# Patient Record
Sex: FEMALE | Race: WHITE | Employment: OTHER
[De-identification: names, ages, dates, MRNs, and addresses within clinical notes are randomized per-mention and may not be internally consistent; named-entity substitution may affect disease eponyms.]

---

## 2018-04-19 ENCOUNTER — RECORDS - HEALTHEAST (OUTPATIENT)
Dept: ADMINISTRATIVE | Facility: OTHER | Age: 68
End: 2018-04-19

## 2018-04-19 ENCOUNTER — AMBULATORY - HEALTHEAST (OUTPATIENT)
Dept: NEUROSURGERY | Facility: CLINIC | Age: 68
End: 2018-04-19

## 2018-04-20 ENCOUNTER — OFFICE VISIT - HEALTHEAST (OUTPATIENT)
Dept: NEUROSURGERY | Facility: CLINIC | Age: 68
End: 2018-04-20

## 2018-04-20 ENCOUNTER — RECORDS - HEALTHEAST (OUTPATIENT)
Dept: RADIOLOGY | Facility: CLINIC | Age: 68
End: 2018-04-20

## 2018-04-20 ENCOUNTER — COMMUNICATION - HEALTHEAST (OUTPATIENT)
Dept: NEUROSURGERY | Facility: CLINIC | Age: 68
End: 2018-04-20

## 2018-04-20 ENCOUNTER — RECORDS - HEALTHEAST (OUTPATIENT)
Dept: ADMINISTRATIVE | Facility: OTHER | Age: 68
End: 2018-04-20

## 2018-04-20 DIAGNOSIS — M51.16 LUMBAR DISC HERNIATION WITH RADICULOPATHY: ICD-10-CM

## 2018-04-20 ASSESSMENT — MIFFLIN-ST. JEOR: SCORE: 1246.64

## 2018-04-23 ENCOUNTER — ANESTHESIA - HEALTHEAST (OUTPATIENT)
Dept: SURGERY | Facility: CLINIC | Age: 68
End: 2018-04-23

## 2018-04-23 ENCOUNTER — SURGERY - HEALTHEAST (OUTPATIENT)
Dept: SURGERY | Facility: CLINIC | Age: 68
End: 2018-04-23

## 2018-04-23 ASSESSMENT — MIFFLIN-ST. JEOR: SCORE: 1267.88

## 2018-04-24 ENCOUNTER — COMMUNICATION - HEALTHEAST (OUTPATIENT)
Dept: NEUROSURGERY | Facility: CLINIC | Age: 68
End: 2018-04-24

## 2018-04-25 ENCOUNTER — COMMUNICATION - HEALTHEAST (OUTPATIENT)
Dept: NEUROSURGERY | Facility: CLINIC | Age: 68
End: 2018-04-25

## 2018-04-27 ENCOUNTER — RECORDS - HEALTHEAST (OUTPATIENT)
Dept: ADMINISTRATIVE | Facility: OTHER | Age: 68
End: 2018-04-27

## 2018-04-30 ENCOUNTER — AMBULATORY - HEALTHEAST (OUTPATIENT)
Dept: NEUROSURGERY | Facility: CLINIC | Age: 68
End: 2018-04-30

## 2018-04-30 DIAGNOSIS — M54.16 LEFT LUMBAR RADICULOPATHY: ICD-10-CM

## 2018-04-30 DIAGNOSIS — Z51.89 VISIT FOR WOUND CHECK: ICD-10-CM

## 2018-05-07 ENCOUNTER — AMBULATORY - HEALTHEAST (OUTPATIENT)
Dept: NEUROSURGERY | Facility: CLINIC | Age: 68
End: 2018-05-07

## 2018-05-07 ENCOUNTER — RECORDS - HEALTHEAST (OUTPATIENT)
Dept: ADMINISTRATIVE | Facility: OTHER | Age: 68
End: 2018-05-07

## 2018-05-08 ENCOUNTER — COMMUNICATION - HEALTHEAST (OUTPATIENT)
Dept: NEUROSURGERY | Facility: CLINIC | Age: 68
End: 2018-05-08

## 2018-05-25 ENCOUNTER — OFFICE VISIT - HEALTHEAST (OUTPATIENT)
Dept: NEUROSURGERY | Facility: CLINIC | Age: 68
End: 2018-05-25

## 2018-05-25 DIAGNOSIS — M54.16 LEFT LUMBAR RADICULOPATHY: ICD-10-CM

## 2018-07-18 ENCOUNTER — RECORDS - HEALTHEAST (OUTPATIENT)
Dept: ADMINISTRATIVE | Facility: OTHER | Age: 68
End: 2018-07-18

## 2018-10-25 ENCOUNTER — COMMUNICATION - HEALTHEAST (OUTPATIENT)
Dept: NEUROSURGERY | Facility: CLINIC | Age: 68
End: 2018-10-25

## 2018-11-02 ENCOUNTER — HOSPITAL ENCOUNTER (OUTPATIENT)
Dept: MRI IMAGING | Facility: HOSPITAL | Age: 68
Discharge: HOME | End: 2018-11-02
Attending: NEUROLOGICAL SURGERY

## 2018-11-02 ENCOUNTER — OFFICE VISIT - HEALTHEAST (OUTPATIENT)
Dept: NEUROSURGERY | Facility: CLINIC | Age: 68
End: 2018-11-02

## 2018-11-02 DIAGNOSIS — M54.16 LUMBAR RADICULOPATHY: ICD-10-CM

## 2018-11-02 LAB
CREAT BLD-MCNC: 0.9 MG/DL
CREAT BLD-MCNC: 0.9 MG/DL (ref 0.6–1.1)
POC GFR AMER AF HE - HISTORICAL: >60 ML/MIN/1.73M2
POC GFR NON AMER AF HE - HISTORICAL: >60 ML/MIN/1.73M2

## 2018-11-02 ASSESSMENT — MIFFLIN-ST. JEOR: SCORE: 1264.78

## 2018-11-21 ENCOUNTER — HOSPITAL ENCOUNTER (OUTPATIENT)
Dept: CT IMAGING | Facility: CLINIC | Age: 68
Discharge: HOME | End: 2018-11-21
Attending: SURGERY

## 2018-11-21 ENCOUNTER — AMBULATORY - HEALTHEAST (OUTPATIENT)
Dept: NEUROSURGERY | Facility: CLINIC | Age: 68
End: 2018-11-21

## 2018-11-21 ENCOUNTER — OFFICE VISIT - HEALTHEAST (OUTPATIENT)
Dept: NEUROSURGERY | Facility: CLINIC | Age: 68
End: 2018-11-21

## 2018-11-21 ENCOUNTER — AMBULATORY - HEALTHEAST (OUTPATIENT)
Dept: LAB | Facility: CLINIC | Age: 68
End: 2018-11-21

## 2018-11-21 DIAGNOSIS — Z01.818 PRE-OP EVALUATION: ICD-10-CM

## 2018-11-21 DIAGNOSIS — M51.26 LUMBAR HERNIATED DISC: ICD-10-CM

## 2018-11-21 LAB
ANION GAP SERPL CALCULATED.3IONS-SCNC: 7 MMOL/L (ref 5–18)
APTT PPP: 27 SECONDS (ref 24–37)
BASOPHILS # BLD AUTO: 0 THOU/UL (ref 0–0.2)
BASOPHILS NFR BLD AUTO: 1 % (ref 0–2)
BUN SERPL-MCNC: 12 MG/DL (ref 8–22)
CALCIUM SERPL-MCNC: 10.5 MG/DL (ref 8.5–10.5)
CHLORIDE BLD-SCNC: 108 MMOL/L (ref 98–107)
CLOSURE TME COLL+EPINEP BLD: 94 SEC (ref 1–180)
CO2 SERPL-SCNC: 27 MMOL/L (ref 22–31)
CREAT SERPL-MCNC: 0.71 MG/DL (ref 0.6–1.1)
EOSINOPHIL # BLD AUTO: 0.1 THOU/UL (ref 0–0.4)
EOSINOPHIL NFR BLD AUTO: 2 % (ref 0–6)
ERYTHROCYTE [DISTWIDTH] IN BLOOD BY AUTOMATED COUNT: 12.1 % (ref 11–14.5)
GFR SERPL CREATININE-BSD FRML MDRD: >60 ML/MIN/1.73M2
GLUCOSE BLD-MCNC: 96 MG/DL (ref 70–125)
HCT VFR BLD AUTO: 38.1 % (ref 35–47)
HGB BLD-MCNC: 12.7 G/DL (ref 12–16)
INR PPP: 0.94 (ref 0.9–1.1)
LYMPHOCYTES # BLD AUTO: 2 THOU/UL (ref 0.8–4.4)
LYMPHOCYTES NFR BLD AUTO: 39 % (ref 20–40)
MCH RBC QN AUTO: 31.6 PG (ref 27–34)
MCHC RBC AUTO-ENTMCNC: 33.3 G/DL (ref 32–36)
MCV RBC AUTO: 95 FL (ref 80–100)
MONOCYTES # BLD AUTO: 0.4 THOU/UL (ref 0–0.9)
MONOCYTES NFR BLD AUTO: 7 % (ref 2–10)
NEUTROPHILS # BLD AUTO: 2.7 THOU/UL (ref 2–7.7)
NEUTROPHILS NFR BLD AUTO: 52 % (ref 50–70)
PLATELET # BLD AUTO: 234 THOU/UL (ref 140–440)
PMV BLD AUTO: 9.9 FL (ref 8.5–12.5)
POTASSIUM BLD-SCNC: 4.1 MMOL/L (ref 3.5–5)
RBC # BLD AUTO: 4.02 MILL/UL (ref 3.8–5.4)
SODIUM SERPL-SCNC: 142 MMOL/L (ref 136–145)
WBC: 5.2 THOU/UL (ref 4–11)

## 2018-11-21 ASSESSMENT — MIFFLIN-ST. JEOR: SCORE: 1264.78

## 2018-11-23 ENCOUNTER — COMMUNICATION - HEALTHEAST (OUTPATIENT)
Dept: NEUROSURGERY | Facility: CLINIC | Age: 68
End: 2018-11-23

## 2018-11-26 ENCOUNTER — RECORDS - HEALTHEAST (OUTPATIENT)
Dept: ADMINISTRATIVE | Facility: OTHER | Age: 68
End: 2018-11-26

## 2018-11-27 ENCOUNTER — ANESTHESIA - HEALTHEAST (OUTPATIENT)
Dept: SURGERY | Facility: CLINIC | Age: 68
End: 2018-11-27

## 2018-11-27 ENCOUNTER — AMBULATORY - HEALTHEAST (OUTPATIENT)
Dept: NEUROSURGERY | Facility: CLINIC | Age: 68
End: 2018-11-27

## 2018-11-27 ENCOUNTER — COMMUNICATION - HEALTHEAST (OUTPATIENT)
Dept: NEUROSURGERY | Facility: CLINIC | Age: 68
End: 2018-11-27

## 2018-11-28 ENCOUNTER — SURGERY - HEALTHEAST (OUTPATIENT)
Dept: SURGERY | Facility: CLINIC | Age: 68
End: 2018-11-28

## 2018-11-28 ASSESSMENT — MIFFLIN-ST. JEOR: SCORE: 1257.12

## 2018-11-29 ENCOUNTER — COMMUNICATION - HEALTHEAST (OUTPATIENT)
Dept: NEUROLOGY | Facility: CLINIC | Age: 68
End: 2018-11-29

## 2018-12-21 ENCOUNTER — AMBULATORY - HEALTHEAST (OUTPATIENT)
Dept: NEUROSURGERY | Facility: CLINIC | Age: 68
End: 2018-12-21

## 2018-12-21 DIAGNOSIS — M54.9 BACK PAIN: ICD-10-CM

## 2019-01-01 ENCOUNTER — COMMUNICATION - HEALTHEAST (OUTPATIENT)
Dept: PALLIATIVE MEDICINE | Facility: OTHER | Age: 69
End: 2019-01-01

## 2019-01-01 ENCOUNTER — HOSPITAL ENCOUNTER (OUTPATIENT)
Dept: PALLIATIVE MEDICINE | Facility: OTHER | Age: 69
Discharge: HOME | End: 2019-12-19
Attending: PAIN MEDICINE | Admitting: PAIN MEDICINE

## 2019-01-01 ENCOUNTER — RECORDS - HEALTHEAST (OUTPATIENT)
Dept: ADMINISTRATIVE | Facility: OTHER | Age: 69
End: 2019-01-01

## 2019-01-01 ENCOUNTER — AMBULATORY - HEALTHEAST (OUTPATIENT)
Dept: PALLIATIVE MEDICINE | Facility: OTHER | Age: 69
End: 2019-01-01

## 2019-01-01 ENCOUNTER — AMBULATORY - HEALTHEAST (OUTPATIENT)
Dept: NEUROSURGERY | Facility: CLINIC | Age: 69
End: 2019-01-01

## 2019-01-01 DIAGNOSIS — M54.16 LUMBAR RADICULOPATHY: ICD-10-CM

## 2019-01-01 ASSESSMENT — MIFFLIN-ST. JEOR: SCORE: 1255.71

## 2019-04-30 ENCOUNTER — RECORDS - HEALTHEAST (OUTPATIENT)
Dept: ADMINISTRATIVE | Facility: OTHER | Age: 69
End: 2019-04-30

## 2019-09-11 ENCOUNTER — COMMUNICATION - HEALTHEAST (OUTPATIENT)
Dept: NEUROSURGERY | Facility: CLINIC | Age: 69
End: 2019-09-11

## 2019-09-11 DIAGNOSIS — M54.9 BACK PAIN: ICD-10-CM

## 2019-09-30 ENCOUNTER — RECORDS - HEALTHEAST (OUTPATIENT)
Dept: ADMINISTRATIVE | Facility: OTHER | Age: 69
End: 2019-09-30

## 2019-10-04 ENCOUNTER — RECORDS - HEALTHEAST (OUTPATIENT)
Dept: RADIOLOGY | Facility: CLINIC | Age: 69
End: 2019-10-04

## 2019-10-08 LAB — MAMMOGRAM: NORMAL

## 2019-10-11 ENCOUNTER — OFFICE VISIT - HEALTHEAST (OUTPATIENT)
Dept: NEUROSURGERY | Facility: CLINIC | Age: 69
End: 2019-10-11

## 2019-10-11 DIAGNOSIS — M79.605 PAIN OF LEFT LOWER EXTREMITY: ICD-10-CM

## 2019-10-11 ASSESSMENT — MIFFLIN-ST. JEOR: SCORE: 1255.71

## 2019-10-22 ENCOUNTER — HOSPITAL ENCOUNTER (OUTPATIENT)
Dept: CT IMAGING | Facility: CLINIC | Age: 69
Discharge: HOME | End: 2019-10-22
Attending: SURGERY

## 2019-10-22 ENCOUNTER — HOSPITAL ENCOUNTER (OUTPATIENT)
Dept: INTERVENTIONAL RADIOLOGY/VASCULAR | Facility: CLINIC | Age: 69
Discharge: HOME | End: 2019-10-22
Attending: SURGERY

## 2019-10-22 DIAGNOSIS — M79.605 PAIN OF LEFT LOWER EXTREMITY: ICD-10-CM

## 2019-10-22 DIAGNOSIS — M54.16 LEFT LUMBAR RADICULOPATHY: ICD-10-CM

## 2019-10-28 ENCOUNTER — COMMUNICATION - HEALTHEAST (OUTPATIENT)
Dept: NEUROSURGERY | Facility: CLINIC | Age: 69
End: 2019-10-28

## 2019-11-08 ENCOUNTER — OFFICE VISIT - HEALTHEAST (OUTPATIENT)
Dept: NEUROSURGERY | Facility: CLINIC | Age: 69
End: 2019-11-08

## 2019-11-08 DIAGNOSIS — R52 PAIN: ICD-10-CM

## 2019-11-08 ASSESSMENT — MIFFLIN-ST. JEOR: SCORE: 1255.71

## 2020-01-01 ENCOUNTER — TRANSFERRED RECORDS (OUTPATIENT)
Dept: HEALTH INFORMATION MANAGEMENT | Facility: CLINIC | Age: 70
End: 2020-01-01

## 2020-01-01 ENCOUNTER — HOSPITAL ENCOUNTER (OUTPATIENT)
Facility: CLINIC | Age: 70
Discharge: HOME | End: 2020-07-21
Attending: INTERNAL MEDICINE | Admitting: INTERNAL MEDICINE
Payer: MEDICARE

## 2020-01-01 ENCOUNTER — OFFICE VISIT (OUTPATIENT)
Dept: GASTROENTEROLOGY | Facility: CLINIC | Age: 70
End: 2020-01-01
Attending: STUDENT IN AN ORGANIZED HEALTH CARE EDUCATION/TRAINING PROGRAM
Payer: MEDICARE

## 2020-01-01 ENCOUNTER — TELEPHONE (OUTPATIENT)
Dept: GASTROENTEROLOGY | Facility: CLINIC | Age: 70
End: 2020-01-01

## 2020-01-01 ENCOUNTER — AMBULATORY - HEALTHEAST (OUTPATIENT)
Dept: FAMILY MEDICINE | Facility: CLINIC | Age: 70
End: 2020-01-01

## 2020-01-01 ENCOUNTER — PATIENT OUTREACH (OUTPATIENT)
Dept: GASTROENTEROLOGY | Facility: CLINIC | Age: 70
End: 2020-01-01

## 2020-01-01 ENCOUNTER — ANESTHESIA (OUTPATIENT)
Dept: SURGERY | Facility: CLINIC | Age: 70
DRG: 438 | End: 2020-01-01
Payer: MEDICARE

## 2020-01-01 ENCOUNTER — APPOINTMENT (OUTPATIENT)
Dept: GENERAL RADIOLOGY | Facility: CLINIC | Age: 70
DRG: 438 | End: 2020-01-01
Attending: INTERNAL MEDICINE
Payer: MEDICARE

## 2020-01-01 ENCOUNTER — COMMUNICATION - HEALTHEAST (OUTPATIENT)
Dept: NEUROSURGERY | Facility: CLINIC | Age: 70
End: 2020-01-01

## 2020-01-01 ENCOUNTER — CARE COORDINATION (OUTPATIENT)
Dept: GASTROENTEROLOGY | Facility: CLINIC | Age: 70
End: 2020-01-01

## 2020-01-01 ENCOUNTER — ANESTHESIA EVENT (OUTPATIENT)
Dept: SURGERY | Facility: CLINIC | Age: 70
End: 2020-01-01
Payer: MEDICARE

## 2020-01-01 ENCOUNTER — ANESTHESIA (OUTPATIENT)
Dept: SURGERY | Facility: CLINIC | Age: 70
End: 2020-01-01
Payer: MEDICARE

## 2020-01-01 ENCOUNTER — VIRTUAL VISIT (OUTPATIENT)
Dept: GASTROENTEROLOGY | Facility: CLINIC | Age: 70
End: 2020-01-01
Payer: MEDICARE

## 2020-01-01 ENCOUNTER — APPOINTMENT (OUTPATIENT)
Dept: GENERAL RADIOLOGY | Facility: CLINIC | Age: 70
End: 2020-01-01
Attending: INTERNAL MEDICINE
Payer: MEDICARE

## 2020-01-01 ENCOUNTER — APPOINTMENT (OUTPATIENT)
Dept: CT IMAGING | Facility: CLINIC | Age: 70
DRG: 438 | End: 2020-01-01
Payer: MEDICARE

## 2020-01-01 ENCOUNTER — COMMUNICATION - HEALTHEAST (OUTPATIENT)
Dept: PALLIATIVE MEDICINE | Facility: OTHER | Age: 70
End: 2020-01-01

## 2020-01-01 ENCOUNTER — DOCUMENTATION ONLY (OUTPATIENT)
Dept: CARE COORDINATION | Facility: CLINIC | Age: 70
End: 2020-01-01

## 2020-01-01 ENCOUNTER — HOSPITAL ENCOUNTER (OUTPATIENT)
Dept: GENERAL RADIOLOGY | Facility: CLINIC | Age: 70
End: 2020-06-04
Attending: INTERNAL MEDICINE
Payer: MEDICARE

## 2020-01-01 ENCOUNTER — PATIENT OUTREACH (OUTPATIENT)
Dept: CARE COORDINATION | Facility: CLINIC | Age: 70
End: 2020-01-01

## 2020-01-01 ENCOUNTER — PRE VISIT (OUTPATIENT)
Dept: GASTROENTEROLOGY | Facility: CLINIC | Age: 70
End: 2020-01-01

## 2020-01-01 ENCOUNTER — HOSPITAL ENCOUNTER (INPATIENT)
Facility: CLINIC | Age: 70
LOS: 2 days | Discharge: HOME | DRG: 438 | End: 2020-06-23
Attending: INTERNAL MEDICINE | Admitting: FAMILY MEDICINE
Payer: MEDICARE

## 2020-01-01 ENCOUNTER — PREP FOR PROCEDURE (OUTPATIENT)
Dept: GASTROENTEROLOGY | Facility: CLINIC | Age: 70
End: 2020-01-01

## 2020-01-01 ENCOUNTER — HOSPITAL ENCOUNTER (INPATIENT)
Facility: CLINIC | Age: 70
LOS: 6 days | Discharge: HOME | DRG: 438 | End: 2020-10-27
Attending: EMERGENCY MEDICINE | Admitting: INTERNAL MEDICINE
Payer: MEDICARE

## 2020-01-01 ENCOUNTER — HOSPITAL ENCOUNTER (OUTPATIENT)
Facility: CLINIC | Age: 70
Discharge: HOME | End: 2020-06-04
Attending: INTERNAL MEDICINE | Admitting: INTERNAL MEDICINE
Payer: MEDICARE

## 2020-01-01 ENCOUNTER — AMBULATORY - HEALTHEAST (OUTPATIENT)
Dept: LAB | Facility: CLINIC | Age: 70
End: 2020-01-01

## 2020-01-01 ENCOUNTER — ANESTHESIA EVENT (OUTPATIENT)
Dept: SURGERY | Facility: CLINIC | Age: 70
DRG: 438 | End: 2020-01-01
Payer: MEDICARE

## 2020-01-01 ENCOUNTER — HOSPITAL ENCOUNTER (OUTPATIENT)
Facility: CLINIC | Age: 70
Discharge: HOME | End: 2020-04-23
Attending: INTERNAL MEDICINE | Admitting: INTERNAL MEDICINE
Payer: MEDICARE

## 2020-01-01 ENCOUNTER — OFFICE VISIT (OUTPATIENT)
Dept: URGENT CARE | Facility: URGENT CARE | Age: 70
End: 2020-01-01
Payer: MEDICARE

## 2020-01-01 ENCOUNTER — HOSPITAL ENCOUNTER (OUTPATIENT)
Facility: CLINIC | Age: 70
End: 2020-01-01
Attending: INTERNAL MEDICINE | Admitting: INTERNAL MEDICINE
Payer: MEDICARE

## 2020-01-01 ENCOUNTER — COMMUNICATION - HEALTHEAST (OUTPATIENT)
Dept: FAMILY MEDICINE | Facility: CLINIC | Age: 70
End: 2020-01-01

## 2020-01-01 VITALS
TEMPERATURE: 98 F | BODY MASS INDEX: 24 KG/M2 | WEIGHT: 139.8 LBS | OXYGEN SATURATION: 100 % | DIASTOLIC BLOOD PRESSURE: 76 MMHG | SYSTOLIC BLOOD PRESSURE: 122 MMHG | HEART RATE: 68 BPM

## 2020-01-01 VITALS
SYSTOLIC BLOOD PRESSURE: 131 MMHG | OXYGEN SATURATION: 98 % | RESPIRATION RATE: 22 BRPM | BODY MASS INDEX: 22.94 KG/M2 | TEMPERATURE: 97.9 F | HEART RATE: 78 BPM | HEIGHT: 64 IN | DIASTOLIC BLOOD PRESSURE: 88 MMHG | WEIGHT: 134.4 LBS

## 2020-01-01 VITALS
RESPIRATION RATE: 16 BRPM | OXYGEN SATURATION: 97 % | HEART RATE: 81 BPM | TEMPERATURE: 97.8 F | DIASTOLIC BLOOD PRESSURE: 76 MMHG | SYSTOLIC BLOOD PRESSURE: 148 MMHG

## 2020-01-01 VITALS
RESPIRATION RATE: 16 BRPM | OXYGEN SATURATION: 96 % | DIASTOLIC BLOOD PRESSURE: 69 MMHG | HEART RATE: 71 BPM | SYSTOLIC BLOOD PRESSURE: 116 MMHG

## 2020-01-01 VITALS
SYSTOLIC BLOOD PRESSURE: 121 MMHG | HEART RATE: 60 BPM | HEIGHT: 64 IN | DIASTOLIC BLOOD PRESSURE: 74 MMHG | OXYGEN SATURATION: 100 % | BODY MASS INDEX: 23.75 KG/M2 | RESPIRATION RATE: 16 BRPM | TEMPERATURE: 97.5 F | WEIGHT: 139.11 LBS

## 2020-01-01 VITALS
HEIGHT: 64 IN | BODY MASS INDEX: 25.63 KG/M2 | RESPIRATION RATE: 16 BRPM | HEART RATE: 68 BPM | TEMPERATURE: 97.8 F | DIASTOLIC BLOOD PRESSURE: 72 MMHG | SYSTOLIC BLOOD PRESSURE: 141 MMHG | WEIGHT: 150.13 LBS | OXYGEN SATURATION: 98 %

## 2020-01-01 VITALS — BODY MASS INDEX: 23.05 KG/M2 | WEIGHT: 135 LBS | HEIGHT: 64 IN

## 2020-01-01 DIAGNOSIS — K86.1 CHRONIC PANCREATITIS (H): ICD-10-CM

## 2020-01-01 DIAGNOSIS — Z20.822 SUSPECTED COVID-19 VIRUS INFECTION: Primary | ICD-10-CM

## 2020-01-01 DIAGNOSIS — Z11.59 ENCOUNTER FOR SCREENING FOR OTHER VIRAL DISEASES: ICD-10-CM

## 2020-01-01 DIAGNOSIS — Z11.59 SPECIAL SCREENING EXAMINATION FOR VIRAL DISEASE: ICD-10-CM

## 2020-01-01 DIAGNOSIS — Z46.89 ENCOUNTER FOR REMOVAL OF BILIARY STENT: ICD-10-CM

## 2020-01-01 DIAGNOSIS — K86.1 CHRONIC PANCREATITIS (H): Primary | ICD-10-CM

## 2020-01-01 DIAGNOSIS — Q45.3 PANCREAS DIVISUM: Primary | ICD-10-CM

## 2020-01-01 DIAGNOSIS — R94.5 NONSPECIFIC ABNORMAL RESULTS OF LIVER FUNCTION STUDY: ICD-10-CM

## 2020-01-01 DIAGNOSIS — K86.89 PANCREATIC DUCT STRICTURE: ICD-10-CM

## 2020-01-01 DIAGNOSIS — K85.00 IDIOPATHIC ACUTE PANCREATITIS: Primary | ICD-10-CM

## 2020-01-01 DIAGNOSIS — K85.00 IDIOPATHIC ACUTE PANCREATITIS: ICD-10-CM

## 2020-01-01 DIAGNOSIS — Z46.89 ENCOUNTER FOR REMOVAL OF BILIARY STENT: Primary | ICD-10-CM

## 2020-01-01 DIAGNOSIS — Z11.59 ENCOUNTER FOR SCREENING FOR OTHER VIRAL DISEASES: Primary | ICD-10-CM

## 2020-01-01 DIAGNOSIS — K85.90 ACUTE ON CHRONIC PANCREATITIS (H): ICD-10-CM

## 2020-01-01 DIAGNOSIS — Z20.828 EXPOSURE TO SARS-ASSOCIATED CORONAVIRUS: ICD-10-CM

## 2020-01-01 DIAGNOSIS — K74.60 CIRRHOSIS OF LIVER WITHOUT ASCITES, UNSPECIFIED HEPATIC CIRRHOSIS TYPE (H): Primary | ICD-10-CM

## 2020-01-01 DIAGNOSIS — Z20.822 ENCOUNTER FOR LABORATORY TESTING FOR COVID-19 VIRUS: ICD-10-CM

## 2020-01-01 DIAGNOSIS — K86.1 ACUTE ON CHRONIC PANCREATITIS (H): ICD-10-CM

## 2020-01-01 DIAGNOSIS — K85.90 HEREDITARY PANCREATITIS: ICD-10-CM

## 2020-01-01 DIAGNOSIS — K85.00 IDIOPATHIC ACUTE PANCREATITIS, UNSPECIFIED COMPLICATION STATUS: Primary | ICD-10-CM

## 2020-01-01 DIAGNOSIS — K86.1 CHRONIC PANCREATITIS, UNSPECIFIED PANCREATITIS TYPE (H): ICD-10-CM

## 2020-01-01 DIAGNOSIS — D69.6 THROMBOCYTOPENIA (H): Primary | ICD-10-CM

## 2020-01-01 DIAGNOSIS — I81 PORTAL VEIN THROMBOSIS: ICD-10-CM

## 2020-01-01 DIAGNOSIS — K85.90 HEREDITARY PANCREATITIS: Primary | ICD-10-CM

## 2020-01-01 DIAGNOSIS — K86.89 PANCREATIC DUCT STRICTURE: Primary | ICD-10-CM

## 2020-01-01 DIAGNOSIS — K76.0 HEPATIC STEATOSIS: ICD-10-CM

## 2020-01-01 DIAGNOSIS — K85.00 IDIOPATHIC ACUTE PANCREATITIS, UNSPECIFIED COMPLICATION STATUS: ICD-10-CM

## 2020-01-01 LAB
ALBUMIN SERPL-MCNC: 2.3 G/DL (ref 3.4–5)
ALBUMIN SERPL-MCNC: 2.8 G/DL (ref 3.4–5)
ALBUMIN SERPL-MCNC: 2.9 G/DL (ref 3.4–5)
ALBUMIN SERPL-MCNC: 3.3 G/DL (ref 3.4–5)
ALBUMIN SERPL-MCNC: 3.5 G/DL (ref 3.4–5)
ALBUMIN SERPL-MCNC: 3.8 G/DL (ref 3.4–5)
ALP SERPL-CCNC: 108 U/L (ref 40–150)
ALP SERPL-CCNC: 108 U/L (ref 40–150)
ALP SERPL-CCNC: 122 U/L (ref 40–150)
ALP SERPL-CCNC: 126 U/L (ref 40–150)
ALP SERPL-CCNC: 132 U/L (ref 40–150)
ALP SERPL-CCNC: 149 U/L (ref 40–150)
ALP SERPL-CCNC: 150 U/L (ref 40–150)
ALP SERPL-CCNC: 180 U/L (ref 40–150)
ALP SERPL-CCNC: 184 U/L (ref 40–150)
ALP SERPL-CCNC: 184 U/L (ref 40–150)
ALP SERPL-CCNC: 191 U/L (ref 40–150)
ALT SERPL W P-5'-P-CCNC: 14 U/L (ref 0–50)
ALT SERPL W P-5'-P-CCNC: 16 U/L (ref 0–50)
ALT SERPL W P-5'-P-CCNC: 18 U/L (ref 0–50)
ALT SERPL W P-5'-P-CCNC: 20 U/L (ref 0–50)
ALT SERPL W P-5'-P-CCNC: 22 U/L (ref 0–50)
ALT SERPL W P-5'-P-CCNC: 22 U/L (ref 0–50)
ALT SERPL W P-5'-P-CCNC: 23 U/L (ref 0–50)
ALT SERPL W P-5'-P-CCNC: 32 U/L (ref 0–50)
ALT SERPL W P-5'-P-CCNC: 40 U/L (ref 0–50)
ALT SERPL-CCNC: 16 IU/L (ref 8–45)
AMYLASE SERPL-CCNC: 39 U/L (ref 30–110)
AMYLASE SERPL-CCNC: 47 U/L (ref 30–110)
AMYLASE SERPL-CCNC: 67 U/L (ref 30–110)
ANION GAP SERPL CALCULATED.3IONS-SCNC: 1 MMOL/L (ref 3–14)
ANION GAP SERPL CALCULATED.3IONS-SCNC: 3 MMOL/L (ref 3–14)
ANION GAP SERPL CALCULATED.3IONS-SCNC: 5 MMOL/L (ref 3–14)
ANION GAP SERPL CALCULATED.3IONS-SCNC: 6 MMOL/L (ref 3–14)
ANION GAP SERPL CALCULATED.3IONS-SCNC: 8 MMOL/L (ref 3–14)
AST SERPL W P-5'-P-CCNC: 13 U/L (ref 0–45)
AST SERPL W P-5'-P-CCNC: 13 U/L (ref 0–45)
AST SERPL W P-5'-P-CCNC: 14 U/L (ref 0–45)
AST SERPL W P-5'-P-CCNC: 18 U/L (ref 0–45)
AST SERPL W P-5'-P-CCNC: 19 U/L (ref 0–45)
AST SERPL W P-5'-P-CCNC: 19 U/L (ref 0–45)
AST SERPL W P-5'-P-CCNC: 22 U/L (ref 0–45)
AST SERPL W P-5'-P-CCNC: 23 U/L (ref 0–45)
AST SERPL W P-5'-P-CCNC: 26 U/L (ref 0–45)
AST SERPL W P-5'-P-CCNC: 27 U/L (ref 0–45)
AST SERPL W P-5'-P-CCNC: 32 U/L (ref 0–45)
AST SERPL-CCNC: 15 IU/L (ref 2–40)
BASOPHILS # BLD AUTO: 0 10E9/L (ref 0–0.2)
BASOPHILS # BLD AUTO: 0 10E9/L (ref 0–0.2)
BASOPHILS NFR BLD AUTO: 0.3 %
BASOPHILS NFR BLD AUTO: 0.4 %
BILIRUB SERPL-MCNC: 0.4 MG/DL (ref 0.2–1.3)
BILIRUB SERPL-MCNC: 0.4 MG/DL (ref 0.2–1.3)
BILIRUB SERPL-MCNC: 0.5 MG/DL (ref 0.2–1.3)
BILIRUB SERPL-MCNC: 0.8 MG/DL (ref 0.2–1.3)
BILIRUB SERPL-MCNC: 1 MG/DL (ref 0.2–1.3)
BILIRUB SERPL-MCNC: 1.3 MG/DL (ref 0.2–1.3)
BILIRUB SERPL-MCNC: 1.4 MG/DL (ref 0.2–1.3)
BILIRUB SERPL-MCNC: 1.8 MG/DL (ref 0.2–1.3)
BUN SERPL-MCNC: 11 MG/DL (ref 7–30)
BUN SERPL-MCNC: 12 MG/DL (ref 7–30)
BUN SERPL-MCNC: 13 MG/DL (ref 7–30)
BUN SERPL-MCNC: 16 MG/DL (ref 7–30)
BUN SERPL-MCNC: 18 MG/DL (ref 7–30)
BUN SERPL-MCNC: 7 MG/DL (ref 7–30)
BUN SERPL-MCNC: 8 MG/DL (ref 7–30)
CALCIUM SERPL-MCNC: 10 MG/DL (ref 8.5–10.1)
CALCIUM SERPL-MCNC: 10 MG/DL (ref 8.5–10.1)
CALCIUM SERPL-MCNC: 10.1 MG/DL (ref 8.5–10.1)
CALCIUM SERPL-MCNC: 9 MG/DL (ref 8.5–10.1)
CALCIUM SERPL-MCNC: 9.2 MG/DL (ref 8.5–10.1)
CALCIUM SERPL-MCNC: 9.3 MG/DL (ref 8.5–10.1)
CALCIUM SERPL-MCNC: 9.3 MG/DL (ref 8.5–10.1)
CALCIUM SERPL-MCNC: 9.5 MG/DL (ref 8.5–10.1)
CALCIUM SERPL-MCNC: 9.8 MG/DL (ref 8.5–10.1)
CALCIUM SERPL-MCNC: 9.9 MG/DL (ref 8.5–10.1)
CALCIUM SERPL-MCNC: 9.9 MG/DL (ref 8.5–10.1)
CHLORIDE SERPL-SCNC: 108 MMOL/L (ref 94–109)
CHLORIDE SERPL-SCNC: 108 MMOL/L (ref 94–109)
CHLORIDE SERPL-SCNC: 110 MMOL/L (ref 94–109)
CHLORIDE SERPL-SCNC: 110 MMOL/L (ref 94–109)
CHLORIDE SERPL-SCNC: 111 MMOL/L (ref 94–109)
CHLORIDE SERPL-SCNC: 112 MMOL/L (ref 94–109)
CHLORIDE SERPL-SCNC: 114 MMOL/L (ref 94–109)
CO2 SERPL-SCNC: 21 MMOL/L (ref 20–32)
CO2 SERPL-SCNC: 24 MMOL/L (ref 20–32)
CO2 SERPL-SCNC: 24 MMOL/L (ref 20–32)
CO2 SERPL-SCNC: 25 MMOL/L (ref 20–32)
CO2 SERPL-SCNC: 26 MMOL/L (ref 20–32)
CO2 SERPL-SCNC: 27 MMOL/L (ref 20–32)
CO2 SERPL-SCNC: 29 MMOL/L (ref 20–32)
CREAT SERPL-MCNC: 0.55 MG/DL (ref 0.52–1.04)
CREAT SERPL-MCNC: 0.56 MG/DL (ref 0.52–1.04)
CREAT SERPL-MCNC: 0.57 MG/DL (ref 0.52–1.04)
CREAT SERPL-MCNC: 0.57 MG/DL (ref 0.52–1.04)
CREAT SERPL-MCNC: 0.59 MG/DL (ref 0.57–1.11)
CREAT SERPL-MCNC: 0.61 MG/DL (ref 0.52–1.04)
CREAT SERPL-MCNC: 0.61 MG/DL (ref 0.52–1.04)
CREAT SERPL-MCNC: 0.62 MG/DL (ref 0.52–1.04)
CREAT SERPL-MCNC: 0.62 MG/DL (ref 0.52–1.04)
CREAT SERPL-MCNC: 0.63 MG/DL (ref 0.52–1.04)
CREAT SERPL-MCNC: 0.66 MG/DL (ref 0.52–1.04)
CREAT SERPL-MCNC: 0.69 MG/DL (ref 0.52–1.04)
DIFFERENTIAL METHOD BLD: ABNORMAL
DIFFERENTIAL METHOD BLD: ABNORMAL
EOSINOPHIL # BLD AUTO: 0.1 10E9/L (ref 0–0.7)
EOSINOPHIL # BLD AUTO: 0.1 10E9/L (ref 0–0.7)
EOSINOPHIL NFR BLD AUTO: 0.8 %
EOSINOPHIL NFR BLD AUTO: 1.9 %
ERCP: NORMAL
ERYTHROCYTE [DISTWIDTH] IN BLOOD BY AUTOMATED COUNT: 13.3 % (ref 10–15)
ERYTHROCYTE [DISTWIDTH] IN BLOOD BY AUTOMATED COUNT: 13.6 % (ref 10–15)
ERYTHROCYTE [DISTWIDTH] IN BLOOD BY AUTOMATED COUNT: 13.6 % (ref 10–15)
ERYTHROCYTE [DISTWIDTH] IN BLOOD BY AUTOMATED COUNT: 13.7 % (ref 10–15)
ERYTHROCYTE [DISTWIDTH] IN BLOOD BY AUTOMATED COUNT: 13.8 % (ref 10–15)
ERYTHROCYTE [DISTWIDTH] IN BLOOD BY AUTOMATED COUNT: 14 % (ref 10–15)
ERYTHROCYTE [DISTWIDTH] IN BLOOD BY AUTOMATED COUNT: 14.1 % (ref 10–15)
ERYTHROCYTE [DISTWIDTH] IN BLOOD BY AUTOMATED COUNT: 14.1 % (ref 10–15)
ERYTHROCYTE [DISTWIDTH] IN BLOOD BY AUTOMATED COUNT: 14.3 % (ref 10–15)
ERYTHROCYTE [DISTWIDTH] IN BLOOD BY AUTOMATED COUNT: 14.7 % (ref 10–15)
ERYTHROCYTE [DISTWIDTH] IN BLOOD BY AUTOMATED COUNT: 15.9 % (ref 10–15)
FERRITIN SERPL-MCNC: 75 NG/ML (ref 8–252)
GFR SERPL CREATININE-BSD FRML MDRD: 88 ML/MIN/{1.73_M2}
GFR SERPL CREATININE-BSD FRML MDRD: 89 ML/MIN/{1.73_M2}
GFR SERPL CREATININE-BSD FRML MDRD: >60 ML/MIN/1.73M2
GFR SERPL CREATININE-BSD FRML MDRD: >90 ML/MIN/{1.73_M2}
GLUCOSE BLDC GLUCOMTR-MCNC: 123 MG/DL (ref 70–99)
GLUCOSE BLDC GLUCOMTR-MCNC: 130 MG/DL (ref 70–99)
GLUCOSE BLDC GLUCOMTR-MCNC: 69 MG/DL (ref 70–99)
GLUCOSE BLDC GLUCOMTR-MCNC: 74 MG/DL (ref 70–99)
GLUCOSE BLDC GLUCOMTR-MCNC: 90 MG/DL (ref 70–99)
GLUCOSE SERPL-MCNC: 113 MG/DL (ref 70–99)
GLUCOSE SERPL-MCNC: 132 MG/DL (ref 70–99)
GLUCOSE SERPL-MCNC: 77 MG/DL (ref 70–99)
GLUCOSE SERPL-MCNC: 77 MG/DL (ref 70–99)
GLUCOSE SERPL-MCNC: 81 MG/DL (ref 65–100)
GLUCOSE SERPL-MCNC: 82 MG/DL (ref 70–99)
GLUCOSE SERPL-MCNC: 84 MG/DL (ref 70–99)
GLUCOSE SERPL-MCNC: 86 MG/DL (ref 70–99)
GLUCOSE SERPL-MCNC: 88 MG/DL (ref 70–99)
GLUCOSE SERPL-MCNC: 91 MG/DL (ref 70–99)
GLUCOSE SERPL-MCNC: 91 MG/DL (ref 70–99)
GLUCOSE SERPL-MCNC: 92 MG/DL (ref 70–99)
HBV CORE AB SERPL QL IA: NONREACTIVE
HBV SURFACE AB SERPL IA-ACNC: 18.36 M[IU]/ML
HBV SURFACE AG SERPL QL IA: NONREACTIVE
HCT VFR BLD AUTO: 28.9 % (ref 35–47)
HCT VFR BLD AUTO: 33 % (ref 35–47)
HCT VFR BLD AUTO: 33.9 % (ref 35–47)
HCT VFR BLD AUTO: 34.3 % (ref 35–47)
HCT VFR BLD AUTO: 34.4 % (ref 35–47)
HCT VFR BLD AUTO: 34.4 % (ref 35–47)
HCT VFR BLD AUTO: 34.9 % (ref 35–47)
HCT VFR BLD AUTO: 37.7 % (ref 35–47)
HCT VFR BLD AUTO: 39.1 % (ref 35–47)
HCT VFR BLD AUTO: 39.6 % (ref 35–47)
HCT VFR BLD AUTO: 41.1 % (ref 35–47)
HGB BLD-MCNC: 10.4 G/DL (ref 11.7–15.7)
HGB BLD-MCNC: 10.7 G/DL (ref 11.7–15.7)
HGB BLD-MCNC: 10.7 G/DL (ref 11.7–15.7)
HGB BLD-MCNC: 10.8 G/DL (ref 11.7–15.7)
HGB BLD-MCNC: 10.8 G/DL (ref 11.7–15.7)
HGB BLD-MCNC: 11.1 G/DL (ref 11.7–15.7)
HGB BLD-MCNC: 11.3 G/DL (ref 11.7–15.7)
HGB BLD-MCNC: 12.2 G/DL (ref 11.7–15.7)
HGB BLD-MCNC: 12.5 G/DL (ref 11.7–15.7)
HGB BLD-MCNC: 12.7 G/DL (ref 11.7–15.7)
HGB BLD-MCNC: 9 G/DL (ref 11.7–15.7)
IMM GRANULOCYTES # BLD: 0 10E9/L (ref 0–0.4)
IMM GRANULOCYTES # BLD: 0 10E9/L (ref 0–0.4)
IMM GRANULOCYTES NFR BLD: 0.4 %
IMM GRANULOCYTES NFR BLD: 0.6 %
INR PPP: 1.08 (ref 0.86–1.14)
INR PPP: 1.1 (ref 0.86–1.14)
INR PPP: 1.28 (ref 0.86–1.14)
INR PPP: 2.46 (ref 0.86–1.14)
INTERPRETATION ECG - MUSE: NORMAL
INTERPRETATION ECG - MUSE: NORMAL
IRON SATN MFR SERPL: 11 % (ref 15–46)
IRON SERPL-MCNC: 27 UG/DL (ref 35–180)
LACTATE BLD-SCNC: 0.7 MMOL/L (ref 0.7–2)
LIPASE SERPL-CCNC: 1337 U/L (ref 73–393)
LIPASE SERPL-CCNC: 269 U/L (ref 73–393)
LIPASE SERPL-CCNC: 48 U/L (ref 73–393)
LIPASE SERPL-CCNC: 97 U/L (ref 73–393)
LYMPHOCYTES # BLD AUTO: 1.2 10E9/L (ref 0.8–5.3)
LYMPHOCYTES # BLD AUTO: 1.3 10E9/L (ref 0.8–5.3)
LYMPHOCYTES NFR BLD AUTO: 13 %
LYMPHOCYTES NFR BLD AUTO: 22.4 %
MCH RBC QN AUTO: 28 PG (ref 26.5–33)
MCH RBC QN AUTO: 28.3 PG (ref 26.5–33)
MCH RBC QN AUTO: 28.3 PG (ref 26.5–33)
MCH RBC QN AUTO: 28.5 PG (ref 26.5–33)
MCH RBC QN AUTO: 28.5 PG (ref 26.5–33)
MCH RBC QN AUTO: 28.6 PG (ref 26.5–33)
MCH RBC QN AUTO: 28.7 PG (ref 26.5–33)
MCH RBC QN AUTO: 28.7 PG (ref 26.5–33)
MCH RBC QN AUTO: 28.9 PG (ref 26.5–33)
MCH RBC QN AUTO: 29.2 PG (ref 26.5–33)
MCH RBC QN AUTO: 29.3 PG (ref 26.5–33)
MCHC RBC AUTO-ENTMCNC: 30 G/DL (ref 31.5–36.5)
MCHC RBC AUTO-ENTMCNC: 30.8 G/DL (ref 31.5–36.5)
MCHC RBC AUTO-ENTMCNC: 30.9 G/DL (ref 31.5–36.5)
MCHC RBC AUTO-ENTMCNC: 31.1 G/DL (ref 31.5–36.5)
MCHC RBC AUTO-ENTMCNC: 31.1 G/DL (ref 31.5–36.5)
MCHC RBC AUTO-ENTMCNC: 31.2 G/DL (ref 31.5–36.5)
MCHC RBC AUTO-ENTMCNC: 31.4 G/DL (ref 31.5–36.5)
MCHC RBC AUTO-ENTMCNC: 31.5 G/DL (ref 31.5–36.5)
MCHC RBC AUTO-ENTMCNC: 31.8 G/DL (ref 31.5–36.5)
MCHC RBC AUTO-ENTMCNC: 31.9 G/DL (ref 31.5–36.5)
MCHC RBC AUTO-ENTMCNC: 32 G/DL (ref 31.5–36.5)
MCV RBC AUTO: 89 FL (ref 78–100)
MCV RBC AUTO: 89 FL (ref 78–100)
MCV RBC AUTO: 91 FL (ref 78–100)
MCV RBC AUTO: 92 FL (ref 78–100)
MCV RBC AUTO: 93 FL (ref 78–100)
MCV RBC AUTO: 98 FL (ref 78–100)
MONOCYTES # BLD AUTO: 0.6 10E9/L (ref 0–1.3)
MONOCYTES # BLD AUTO: 0.6 10E9/L (ref 0–1.3)
MONOCYTES NFR BLD AUTO: 10.5 %
MONOCYTES NFR BLD AUTO: 6.2 %
NEUTROPHILS # BLD AUTO: 3.4 10E9/L (ref 1.6–8.3)
NEUTROPHILS # BLD AUTO: 7.9 10E9/L (ref 1.6–8.3)
NEUTROPHILS NFR BLD AUTO: 64.2 %
NEUTROPHILS NFR BLD AUTO: 79.3 %
NRBC # BLD AUTO: 0 10*3/UL
NRBC # BLD AUTO: 0 10*3/UL
NRBC BLD AUTO-RTO: 0 /100
NRBC BLD AUTO-RTO: 0 /100
PLATELET # BLD AUTO: 125 10E9/L (ref 150–450)
PLATELET # BLD AUTO: 127 10E9/L (ref 150–450)
PLATELET # BLD AUTO: 136 10E9/L (ref 150–450)
PLATELET # BLD AUTO: 139 10E9/L (ref 150–450)
PLATELET # BLD AUTO: 140 10E9/L (ref 150–450)
PLATELET # BLD AUTO: 158 10E9/L (ref 150–450)
PLATELET # BLD AUTO: 162 10E9/L (ref 150–450)
PLATELET # BLD AUTO: 168 10E9/L (ref 150–450)
PLATELET # BLD AUTO: 221 10E9/L (ref 150–450)
PLATELET # BLD AUTO: 91 10E9/L (ref 150–450)
PLATELET # BLD AUTO: 97 10E9/L (ref 150–450)
POTASSIUM SERPL-SCNC: 3.3 MMOL/L (ref 3.4–5.3)
POTASSIUM SERPL-SCNC: 3.4 MMOL/L (ref 3.4–5.3)
POTASSIUM SERPL-SCNC: 3.5 MMOL/L (ref 3.4–5.3)
POTASSIUM SERPL-SCNC: 3.6 MMOL/L (ref 3.4–5.3)
POTASSIUM SERPL-SCNC: 3.6 MMOL/L (ref 3.4–5.3)
POTASSIUM SERPL-SCNC: 3.7 MMOL/L (ref 3.4–5.3)
POTASSIUM SERPL-SCNC: 3.7 MMOL/L (ref 3.4–5.3)
POTASSIUM SERPL-SCNC: 3.7 MMOL/L (ref 3.5–5)
POTASSIUM SERPL-SCNC: 3.9 MMOL/L (ref 3.4–5.3)
POTASSIUM SERPL-SCNC: 4 MMOL/L (ref 3.4–5.3)
POTASSIUM SERPL-SCNC: 4.2 MMOL/L (ref 3.4–5.3)
POTASSIUM SERPL-SCNC: 4.6 MMOL/L (ref 3.4–5.3)
PROT SERPL-MCNC: 5.2 G/DL (ref 6.8–8.8)
PROT SERPL-MCNC: 5.7 G/DL (ref 6.8–8.8)
PROT SERPL-MCNC: 5.8 G/DL (ref 6.8–8.8)
PROT SERPL-MCNC: 6.2 G/DL (ref 6.8–8.8)
PROT SERPL-MCNC: 6.4 G/DL (ref 6.8–8.8)
PROT SERPL-MCNC: 6.5 G/DL (ref 6.8–8.8)
PROT SERPL-MCNC: 6.5 G/DL (ref 6.8–8.8)
PROT SERPL-MCNC: 6.8 G/DL (ref 6.8–8.8)
PROT SERPL-MCNC: 7.2 G/DL (ref 6.8–8.8)
RBC # BLD AUTO: 3.14 10E12/L (ref 3.8–5.2)
RBC # BLD AUTO: 3.62 10E12/L (ref 3.8–5.2)
RBC # BLD AUTO: 3.75 10E12/L (ref 3.8–5.2)
RBC # BLD AUTO: 3.76 10E12/L (ref 3.8–5.2)
RBC # BLD AUTO: 3.82 10E12/L (ref 3.8–5.2)
RBC # BLD AUTO: 3.84 10E12/L (ref 3.8–5.2)
RBC # BLD AUTO: 3.86 10E12/L (ref 3.8–5.2)
RBC # BLD AUTO: 3.86 10E12/L (ref 3.8–5.2)
RBC # BLD AUTO: 4.28 10E12/L (ref 3.8–5.2)
RBC # BLD AUTO: 4.31 10E12/L (ref 3.8–5.2)
RBC # BLD AUTO: 4.44 10E12/L (ref 3.8–5.2)
SARS-COV-2 PCR COMMENT: NORMAL
SARS-COV-2 RNA SPEC QL NAA+PROBE: NEGATIVE
SARS-COV-2 RNA SPEC QL NAA+PROBE: NORMAL
SARS-COV-2 RNA SPEC QL NAA+PROBE: NOT DETECTED
SODIUM SERPL-SCNC: 138 MMOL/L (ref 133–144)
SODIUM SERPL-SCNC: 140 MMOL/L (ref 133–144)
SODIUM SERPL-SCNC: 140 MMOL/L (ref 133–144)
SODIUM SERPL-SCNC: 141 MMOL/L (ref 133–144)
SODIUM SERPL-SCNC: 142 MMOL/L (ref 133–144)
SODIUM SERPL-SCNC: 143 MMOL/L (ref 133–144)
SODIUM SERPL-SCNC: 144 MMOL/L (ref 133–144)
SPECIMEN SOURCE: NORMAL
TIBC SERPL-MCNC: 243 UG/DL (ref 240–430)
UPPER EUS: NORMAL
UPPER GI ENDOSCOPY: NORMAL
WBC # BLD AUTO: 3.1 10E9/L (ref 4–11)
WBC # BLD AUTO: 3.3 10E9/L (ref 4–11)
WBC # BLD AUTO: 3.3 10E9/L (ref 4–11)
WBC # BLD AUTO: 3.7 10E9/L (ref 4–11)
WBC # BLD AUTO: 3.8 10E9/L (ref 4–11)
WBC # BLD AUTO: 4.4 10E9/L (ref 4–11)
WBC # BLD AUTO: 5.3 10E9/L (ref 4–11)
WBC # BLD AUTO: 5.6 10E9/L (ref 4–11)
WBC # BLD AUTO: 5.8 10E9/L (ref 4–11)
WBC # BLD AUTO: 8.7 10E9/L (ref 4–11)
WBC # BLD AUTO: 9.9 10E9/L (ref 4–11)

## 2020-01-01 PROCEDURE — 999N000128 HC STATISTIC PERIPHERAL IV START W/O US GUIDANCE

## 2020-01-01 PROCEDURE — 25800030 ZZH RX IP 258 OP 636: Performed by: ANESTHESIOLOGY

## 2020-01-01 PROCEDURE — 206N000001 HC R&B BMT UMMC

## 2020-01-01 PROCEDURE — 999N000139 HC STATISTIC PRE-PROCEDURE ASSESSMENT II: Performed by: INTERNAL MEDICINE

## 2020-01-01 PROCEDURE — 80053 COMPREHEN METABOLIC PANEL: CPT | Performed by: INTERNAL MEDICINE

## 2020-01-01 PROCEDURE — 87340 HEPATITIS B SURFACE AG IA: CPT | Performed by: INTERNAL MEDICINE

## 2020-01-01 PROCEDURE — 27210794 ZZH OR GENERAL SUPPLY STERILE: Performed by: INTERNAL MEDICINE

## 2020-01-01 PROCEDURE — 36415 COLL VENOUS BLD VENIPUNCTURE: CPT | Performed by: STUDENT IN AN ORGANIZED HEALTH CARE EDUCATION/TRAINING PROGRAM

## 2020-01-01 PROCEDURE — C1726 CATH, BAL DIL, NON-VASCULAR: HCPCS | Performed by: INTERNAL MEDICINE

## 2020-01-01 PROCEDURE — C1877 STENT, NON-COAT/COV W/O DEL: HCPCS | Performed by: INTERNAL MEDICINE

## 2020-01-01 PROCEDURE — 250N000011 HC RX IP 250 OP 636: Performed by: ANESTHESIOLOGY

## 2020-01-01 PROCEDURE — 36000059 ZZH SURGERY LEVEL 3 EA 15 ADDTL MIN UMMC: Performed by: INTERNAL MEDICINE

## 2020-01-01 PROCEDURE — 37000009 ZZH ANESTHESIA TECHNICAL FEE, EACH ADDTL 15 MIN: Performed by: INTERNAL MEDICINE

## 2020-01-01 PROCEDURE — 25000125 ZZHC RX 250: Performed by: NURSE ANESTHETIST, CERTIFIED REGISTERED

## 2020-01-01 PROCEDURE — C1725 CATH, TRANSLUMIN NON-LASER: HCPCS | Performed by: INTERNAL MEDICINE

## 2020-01-01 PROCEDURE — 86704 HEP B CORE ANTIBODY TOTAL: CPT | Performed by: INTERNAL MEDICINE

## 2020-01-01 PROCEDURE — 25000125 ZZHC RX 250: Performed by: INTERNAL MEDICINE

## 2020-01-01 PROCEDURE — 99238 HOSP IP/OBS DSCHRG MGMT 30/<: CPT | Mod: GC | Performed by: INTERNAL MEDICINE

## 2020-01-01 PROCEDURE — 250N000013 HC RX MED GY IP 250 OP 250 PS 637: Performed by: STUDENT IN AN ORGANIZED HEALTH CARE EDUCATION/TRAINING PROGRAM

## 2020-01-01 PROCEDURE — 250N000011 HC RX IP 250 OP 636: Performed by: STUDENT IN AN ORGANIZED HEALTH CARE EDUCATION/TRAINING PROGRAM

## 2020-01-01 PROCEDURE — 85027 COMPLETE CBC AUTOMATED: CPT | Performed by: STUDENT IN AN ORGANIZED HEALTH CARE EDUCATION/TRAINING PROGRAM

## 2020-01-01 PROCEDURE — 25000128 H RX IP 250 OP 636: Performed by: INTERNAL MEDICINE

## 2020-01-01 PROCEDURE — 36415 COLL VENOUS BLD VENIPUNCTURE: CPT | Performed by: INTERNAL MEDICINE

## 2020-01-01 PROCEDURE — 25000132 ZZH RX MED GY IP 250 OP 250 PS 637: Mod: GY | Performed by: STUDENT IN AN ORGANIZED HEALTH CARE EDUCATION/TRAINING PROGRAM

## 2020-01-01 PROCEDURE — 250N000009 HC RX 250: Performed by: INTERNAL MEDICINE

## 2020-01-01 PROCEDURE — 25500064 ZZH RX 255 OP 636: Performed by: INTERNAL MEDICINE

## 2020-01-01 PROCEDURE — 82962 GLUCOSE BLOOD TEST: CPT

## 2020-01-01 PROCEDURE — 99215 OFFICE O/P EST HI 40 MIN: CPT | Mod: 25 | Performed by: STUDENT IN AN ORGANIZED HEALTH CARE EDUCATION/TRAINING PROGRAM

## 2020-01-01 PROCEDURE — 85610 PROTHROMBIN TIME: CPT | Performed by: INTERNAL MEDICINE

## 2020-01-01 PROCEDURE — 25800030 ZZH RX IP 258 OP 636: Performed by: NURSE ANESTHETIST, CERTIFIED REGISTERED

## 2020-01-01 PROCEDURE — 40000065 ZZH STATISTIC EKG NON-CHARGEABLE

## 2020-01-01 PROCEDURE — 250N000011 HC RX IP 250 OP 636: Performed by: INTERNAL MEDICINE

## 2020-01-01 PROCEDURE — 0F798ZZ DILATION OF COMMON BILE DUCT, VIA NATURAL OR ARTIFICIAL OPENING ENDOSCOPIC: ICD-10-PCS | Performed by: INTERNAL MEDICINE

## 2020-01-01 PROCEDURE — 12000026 ZZH R&B TRANSPLANT

## 2020-01-01 PROCEDURE — 99232 SBSQ HOSP IP/OBS MODERATE 35: CPT | Performed by: INTERNAL MEDICINE

## 2020-01-01 PROCEDURE — 250N000009 HC RX 250: Performed by: NURSE ANESTHETIST, CERTIFIED REGISTERED

## 2020-01-01 PROCEDURE — 43235 EGD DIAGNOSTIC BRUSH WASH: CPT | Performed by: INTERNAL MEDICINE

## 2020-01-01 PROCEDURE — 761N000003 HC RECOVERY PHASE 1 LEVEL 2 FIRST HR: Performed by: INTERNAL MEDICINE

## 2020-01-01 PROCEDURE — U0003 INFECTIOUS AGENT DETECTION BY NUCLEIC ACID (DNA OR RNA); SEVERE ACUTE RESPIRATORY SYNDROME CORONAVIRUS 2 (SARS-COV-2) (CORONAVIRUS DISEASE [COVID-19]), AMPLIFIED PROBE TECHNIQUE, MAKING USE OF HIGH THROUGHPUT TECHNOLOGIES AS DESCRIBED BY CMS-2020-01-R: HCPCS | Performed by: EMERGENCY MEDICINE

## 2020-01-01 PROCEDURE — 83690 ASSAY OF LIPASE: CPT | Performed by: EMERGENCY MEDICINE

## 2020-01-01 PROCEDURE — 93010 ELECTROCARDIOGRAM REPORT: CPT | Performed by: INTERNAL MEDICINE

## 2020-01-01 PROCEDURE — 360N000025 HC SURGERY LEVEL 3 W FLUORO 1ST 30 MIN - UMMC: Performed by: INTERNAL MEDICINE

## 2020-01-01 PROCEDURE — 370N000002 HC ANESTHESIA TECHNICAL FEE, EACH ADDTL 15 MIN: Performed by: INTERNAL MEDICINE

## 2020-01-01 PROCEDURE — 80053 COMPREHEN METABOLIC PANEL: CPT | Performed by: EMERGENCY MEDICINE

## 2020-01-01 PROCEDURE — 25800030 ZZH RX IP 258 OP 636: Performed by: STUDENT IN AN ORGANIZED HEALTH CARE EDUCATION/TRAINING PROGRAM

## 2020-01-01 PROCEDURE — 25000128 H RX IP 250 OP 636: Performed by: STUDENT IN AN ORGANIZED HEALTH CARE EDUCATION/TRAINING PROGRAM

## 2020-01-01 PROCEDURE — G0463 HOSPITAL OUTPT CLINIC VISIT: HCPCS

## 2020-01-01 PROCEDURE — 250N000011 HC RX IP 250 OP 636: Performed by: NURSE ANESTHETIST, CERTIFIED REGISTERED

## 2020-01-01 PROCEDURE — 25000128 H RX IP 250 OP 636: Performed by: NURSE ANESTHETIST, CERTIFIED REGISTERED

## 2020-01-01 PROCEDURE — 82728 ASSAY OF FERRITIN: CPT | Performed by: STUDENT IN AN ORGANIZED HEALTH CARE EDUCATION/TRAINING PROGRAM

## 2020-01-01 PROCEDURE — 93005 ELECTROCARDIOGRAM TRACING: CPT

## 2020-01-01 PROCEDURE — 250N000002 HC ISOFLURANE, EA 15 MIN: Performed by: INTERNAL MEDICINE

## 2020-01-01 PROCEDURE — 71000014 ZZH RECOVERY PHASE 1 LEVEL 2 FIRST HR: Performed by: INTERNAL MEDICINE

## 2020-01-01 PROCEDURE — 40000170 ZZH STATISTIC PRE-PROCEDURE ASSESSMENT II: Performed by: INTERNAL MEDICINE

## 2020-01-01 PROCEDURE — 85027 COMPLETE CBC AUTOMATED: CPT | Performed by: INTERNAL MEDICINE

## 2020-01-01 PROCEDURE — 999N000181 XR SURGERY CARM FLUORO GREATER THAN 5 MIN W STILLS: Mod: TC

## 2020-01-01 PROCEDURE — 80053 COMPREHEN METABOLIC PANEL: CPT | Performed by: STUDENT IN AN ORGANIZED HEALTH CARE EDUCATION/TRAINING PROGRAM

## 2020-01-01 PROCEDURE — 272N000001 HC OR GENERAL SUPPLY STERILE: Performed by: INTERNAL MEDICINE

## 2020-01-01 PROCEDURE — 85025 COMPLETE CBC W/AUTO DIFF WBC: CPT | Performed by: EMERGENCY MEDICINE

## 2020-01-01 PROCEDURE — 25000566 ZZH SEVOFLURANE, EA 15 MIN: Performed by: INTERNAL MEDICINE

## 2020-01-01 PROCEDURE — 96361 HYDRATE IV INFUSION ADD-ON: CPT | Performed by: EMERGENCY MEDICINE

## 2020-01-01 PROCEDURE — 82150 ASSAY OF AMYLASE: CPT | Performed by: INTERNAL MEDICINE

## 2020-01-01 PROCEDURE — 00000146 ZZHCL STATISTIC GLUCOSE BY METER IP

## 2020-01-01 PROCEDURE — 258N000003 HC RX IP 258 OP 636: Performed by: STUDENT IN AN ORGANIZED HEALTH CARE EDUCATION/TRAINING PROGRAM

## 2020-01-01 PROCEDURE — C1769 GUIDE WIRE: HCPCS | Performed by: INTERNAL MEDICINE

## 2020-01-01 PROCEDURE — 83605 ASSAY OF LACTIC ACID: CPT | Performed by: STUDENT IN AN ORGANIZED HEALTH CARE EDUCATION/TRAINING PROGRAM

## 2020-01-01 PROCEDURE — 25000128 H RX IP 250 OP 636: Performed by: ANESTHESIOLOGY

## 2020-01-01 PROCEDURE — 74018 RADEX ABDOMEN 1 VIEW: CPT

## 2020-01-01 PROCEDURE — 36000061 ZZH SURGERY LEVEL 3 W FLUORO 1ST 30 MIN - UMMC: Performed by: INTERNAL MEDICINE

## 2020-01-01 PROCEDURE — 370N000001 HC ANESTHESIA TECHNICAL FEE, 1ST 30 MIN: Performed by: INTERNAL MEDICINE

## 2020-01-01 PROCEDURE — 36000053 ZZH SURGERY LEVEL 2 EA 15 ADDTL MIN - UMMC: Performed by: INTERNAL MEDICINE

## 2020-01-01 PROCEDURE — 99233 SBSQ HOSP IP/OBS HIGH 50: CPT | Mod: GC | Performed by: INTERNAL MEDICINE

## 2020-01-01 PROCEDURE — 250N000013 HC RX MED GY IP 250 OP 250 PS 637: Performed by: INTERNAL MEDICINE

## 2020-01-01 PROCEDURE — 86706 HEP B SURFACE ANTIBODY: CPT | Performed by: INTERNAL MEDICINE

## 2020-01-01 PROCEDURE — 83690 ASSAY OF LIPASE: CPT | Performed by: INTERNAL MEDICINE

## 2020-01-01 PROCEDURE — 999N001017 HC STATISTIC GLUCOSE BY METER IP

## 2020-01-01 PROCEDURE — 71000027 ZZH RECOVERY PHASE 2 EACH 15 MINS: Performed by: INTERNAL MEDICINE

## 2020-01-01 PROCEDURE — 99223 1ST HOSP IP/OBS HIGH 75: CPT | Mod: GC | Performed by: INTERNAL MEDICINE

## 2020-01-01 PROCEDURE — 0F7D8DZ DILATION OF PANCREATIC DUCT WITH INTRALUMINAL DEVICE, VIA NATURAL OR ARTIFICIAL OPENING ENDOSCOPIC: ICD-10-PCS | Performed by: INTERNAL MEDICINE

## 2020-01-01 PROCEDURE — 99285 EMERGENCY DEPT VISIT HI MDM: CPT | Mod: 25 | Performed by: EMERGENCY MEDICINE

## 2020-01-01 PROCEDURE — 99231 SBSQ HOSP IP/OBS SF/LOW 25: CPT | Mod: GC | Performed by: INTERNAL MEDICINE

## 2020-01-01 PROCEDURE — 74329 X-RAY FOR PANCREAS ENDOSCOPY: CPT | Mod: 26 | Performed by: INTERNAL MEDICINE

## 2020-01-01 PROCEDURE — 258N000003 HC RX IP 258 OP 636: Performed by: NURSE ANESTHETIST, CERTIFIED REGISTERED

## 2020-01-01 PROCEDURE — 83540 ASSAY OF IRON: CPT | Performed by: STUDENT IN AN ORGANIZED HEALTH CARE EDUCATION/TRAINING PROGRAM

## 2020-01-01 PROCEDURE — 37000008 ZZH ANESTHESIA TECHNICAL FEE, 1ST 30 MIN: Performed by: INTERNAL MEDICINE

## 2020-01-01 PROCEDURE — 255N000002 HC RX 255 OP 636: Performed by: INTERNAL MEDICINE

## 2020-01-01 PROCEDURE — 43247 EGD REMOVE FOREIGN BODY: CPT | Performed by: INTERNAL MEDICINE

## 2020-01-01 PROCEDURE — 99223 1ST HOSP IP/OBS HIGH 75: CPT | Mod: AI | Performed by: INTERNAL MEDICINE

## 2020-01-01 PROCEDURE — 74178 CT ABD&PLV WO CNTR FLWD CNTR: CPT | Mod: 26 | Performed by: RADIOLOGY

## 2020-01-01 PROCEDURE — 999N000007 HC SITE CHECK

## 2020-01-01 PROCEDURE — 250N000011 HC RX IP 250 OP 636: Performed by: EMERGENCY MEDICINE

## 2020-01-01 PROCEDURE — 85025 COMPLETE CBC W/AUTO DIFF WBC: CPT | Performed by: INTERNAL MEDICINE

## 2020-01-01 PROCEDURE — 87635 SARS-COV-2 COVID-19 AMP PRB: CPT | Performed by: SURGERY

## 2020-01-01 PROCEDURE — 40000277 XR SURGERY CARM FLUORO LESS THAN 5 MIN W STILLS: Mod: TC

## 2020-01-01 PROCEDURE — 85610 PROTHROMBIN TIME: CPT | Performed by: STUDENT IN AN ORGANIZED HEALTH CARE EDUCATION/TRAINING PROGRAM

## 2020-01-01 PROCEDURE — 99358 PROLONG SERVICE W/O CONTACT: CPT | Mod: 25 | Performed by: STUDENT IN AN ORGANIZED HEALTH CARE EDUCATION/TRAINING PROGRAM

## 2020-01-01 PROCEDURE — 96376 TX/PRO/DX INJ SAME DRUG ADON: CPT | Performed by: EMERGENCY MEDICINE

## 2020-01-01 PROCEDURE — 0F798DZ DILATION OF COMMON BILE DUCT WITH INTRALUMINAL DEVICE, VIA NATURAL OR ARTIFICIAL OPENING ENDOSCOPIC: ICD-10-PCS | Performed by: INTERNAL MEDICINE

## 2020-01-01 PROCEDURE — 360N000023 HC SURGERY LEVEL 3 EA 15 ADDTL MIN UMMC: Performed by: INTERNAL MEDICINE

## 2020-01-01 PROCEDURE — 74178 CT ABD&PLV WO CNTR FLWD CNTR: CPT

## 2020-01-01 PROCEDURE — 761N000004 HC RECOVERY PHASE 1 LEVEL 2 EA ADDTL HR: Performed by: INTERNAL MEDICINE

## 2020-01-01 PROCEDURE — 99285 EMERGENCY DEPT VISIT HI MDM: CPT | Performed by: EMERGENCY MEDICINE

## 2020-01-01 PROCEDURE — 96375 TX/PRO/DX INJ NEW DRUG ADDON: CPT | Performed by: EMERGENCY MEDICINE

## 2020-01-01 PROCEDURE — 83550 IRON BINDING TEST: CPT | Performed by: STUDENT IN AN ORGANIZED HEALTH CARE EDUCATION/TRAINING PROGRAM

## 2020-01-01 PROCEDURE — 96374 THER/PROPH/DIAG INJ IV PUSH: CPT | Performed by: EMERGENCY MEDICINE

## 2020-01-01 PROCEDURE — C9803 HOPD COVID-19 SPEC COLLECT: HCPCS | Performed by: EMERGENCY MEDICINE

## 2020-01-01 PROCEDURE — G0500 MOD SEDAT ENDO SERVICE >5YRS: HCPCS | Performed by: INTERNAL MEDICINE

## 2020-01-01 PROCEDURE — 40000279 XR SURGERY CARM FLUORO GREATER THAN 5 MIN W STILLS: Mod: TC

## 2020-01-01 PROCEDURE — 99207 ZZC NO BILLABLE SERVICE THIS VISIT: CPT

## 2020-01-01 PROCEDURE — 43274 ERCP DUCT STENT PLACEMENT: CPT | Performed by: INTERNAL MEDICINE

## 2020-01-01 PROCEDURE — 36000055 ZZH SURGERY LEVEL 2 W FLUORO 1ST 30 MIN - UMMC: Performed by: INTERNAL MEDICINE

## 2020-01-01 PROCEDURE — 99232 SBSQ HOSP IP/OBS MODERATE 35: CPT | Mod: GC | Performed by: INTERNAL MEDICINE

## 2020-01-01 PROCEDURE — 258N000003 HC RX IP 258 OP 636: Performed by: EMERGENCY MEDICINE

## 2020-01-01 PROCEDURE — 83690 ASSAY OF LIPASE: CPT | Mod: 91 | Performed by: INTERNAL MEDICINE

## 2020-01-01 PROCEDURE — 25000565 ZZH ISOFLURANE, EA 15 MIN: Performed by: INTERNAL MEDICINE

## 2020-01-01 DEVICE — STENT FREEMAN PANCREA FLEX 4FRX3CM W/O FLANGE SGL PIGTAIL
Type: IMPLANTABLE DEVICE | Site: PANCREATIC DUCT | Status: NON-FUNCTIONAL
Removed: 2020-07-21

## 2020-01-01 DEVICE — STENT ZIMMON PANCREA 4FRX8CM SGL PIGTAIL G24582: Type: IMPLANTABLE DEVICE | Site: PANCREATIC DUCT | Status: FUNCTIONAL

## 2020-01-01 DEVICE — STENT GEENEN PANCREA 5FRX3CM G49663 GPSO-SF-5-3: Type: IMPLANTABLE DEVICE | Site: PANCREATIC DUCT | Status: FUNCTIONAL

## 2020-01-01 DEVICE — IMPLANTABLE DEVICE: Type: IMPLANTABLE DEVICE | Site: PANCREATIC DUCT | Status: FUNCTIONAL

## 2020-01-01 DEVICE — STENT ZIMMON PANCREA 5FRX10CM SGL PIGTAIL G22362: Type: IMPLANTABLE DEVICE | Site: PANCREATIC DUCT | Status: FUNCTIONAL

## 2020-01-01 DEVICE — STENT FREEMAN PANCREA FLEX 5FRX9CM SGL PIGTAIL
Type: IMPLANTABLE DEVICE | Site: PANCREATIC DUCT | Status: NON-FUNCTIONAL
Removed: 2020-07-21

## 2020-01-01 DEVICE — STENT FREEMAN PANCREA FLEX 4FRX2CM STR: Type: IMPLANTABLE DEVICE | Site: BILE DUCT | Status: FUNCTIONAL

## 2020-01-01 DEVICE — STENT FREEMAN PANCREA FLEX 5FRX7CM SGL PIGTAIL: Type: IMPLANTABLE DEVICE | Site: BILE DUCT | Status: FUNCTIONAL

## 2020-01-01 DEVICE — STENT FREEMAN PANCREA FLEX 4FRX11CM W/O FLANGE SGL PIGTAIL: Type: IMPLANTABLE DEVICE | Site: PANCREAS | Status: FUNCTIONAL

## 2020-01-01 DEVICE — STENT FREEMAN PANCREA FLEX 4FRX3CM W/O FLANGE SGL PIGTAIL: Type: IMPLANTABLE DEVICE | Site: PANCREAS | Status: FUNCTIONAL

## 2020-01-01 RX ORDER — HYDROMORPHONE HYDROCHLORIDE 1 MG/ML
0.5 INJECTION, SOLUTION INTRAMUSCULAR; INTRAVENOUS; SUBCUTANEOUS
Status: COMPLETED | OUTPATIENT
Start: 2020-01-01 | End: 2020-01-01

## 2020-01-01 RX ORDER — DEXTROSE MONOHYDRATE 50 MG/ML
INJECTION, SOLUTION INTRAVENOUS CONTINUOUS
Status: DISCONTINUED | OUTPATIENT
Start: 2020-01-01 | End: 2020-01-01 | Stop reason: HOSPADM

## 2020-01-01 RX ORDER — DIPHENHYDRAMINE HCL 25 MG
25 CAPSULE ORAL
Status: DISCONTINUED | OUTPATIENT
Start: 2020-01-01 | End: 2020-01-01 | Stop reason: HOSPADM

## 2020-01-01 RX ORDER — IOPAMIDOL 510 MG/ML
INJECTION, SOLUTION INTRAVASCULAR PRN
Status: DISCONTINUED | OUTPATIENT
Start: 2020-01-01 | End: 2020-01-01 | Stop reason: HOSPADM

## 2020-01-01 RX ORDER — METOCLOPRAMIDE 5 MG/1
5 TABLET ORAL EVERY 6 HOURS PRN
Status: DISCONTINUED | OUTPATIENT
Start: 2020-01-01 | End: 2020-01-01 | Stop reason: HOSPADM

## 2020-01-01 RX ORDER — LIDOCAINE HYDROCHLORIDE 20 MG/ML
INJECTION, SOLUTION INFILTRATION; PERINEURAL PRN
Status: DISCONTINUED | OUTPATIENT
Start: 2020-01-01 | End: 2020-01-01

## 2020-01-01 RX ORDER — SODIUM CHLORIDE, SODIUM LACTATE, POTASSIUM CHLORIDE, CALCIUM CHLORIDE 600; 310; 30; 20 MG/100ML; MG/100ML; MG/100ML; MG/100ML
INJECTION, SOLUTION INTRAVENOUS CONTINUOUS
Status: DISCONTINUED | OUTPATIENT
Start: 2020-01-01 | End: 2020-01-01 | Stop reason: HOSPADM

## 2020-01-01 RX ORDER — VIT C/E/ZN/COPPR/LUTEIN/ZEAXAN 60 MG-6 MG
1 CAPSULE ORAL DAILY
COMMUNITY

## 2020-01-01 RX ORDER — OXCARBAZEPINE 150 MG/1
150 TABLET, FILM COATED ORAL PRN
COMMUNITY

## 2020-01-01 RX ORDER — ONDANSETRON 2 MG/ML
4 INJECTION INTRAMUSCULAR; INTRAVENOUS EVERY 30 MIN PRN
Status: DISCONTINUED | OUTPATIENT
Start: 2020-01-01 | End: 2020-01-01 | Stop reason: HOSPADM

## 2020-01-01 RX ORDER — NALOXONE HYDROCHLORIDE 0.4 MG/ML
.1-.4 INJECTION, SOLUTION INTRAMUSCULAR; INTRAVENOUS; SUBCUTANEOUS
Status: DISCONTINUED | OUTPATIENT
Start: 2020-01-01 | End: 2020-01-01 | Stop reason: HOSPADM

## 2020-01-01 RX ORDER — SODIUM CHLORIDE, SODIUM LACTATE, POTASSIUM CHLORIDE, CALCIUM CHLORIDE 600; 310; 30; 20 MG/100ML; MG/100ML; MG/100ML; MG/100ML
INJECTION, SOLUTION INTRAVENOUS CONTINUOUS
Status: DISCONTINUED | OUTPATIENT
Start: 2020-01-01 | End: 2020-01-01

## 2020-01-01 RX ORDER — MULTIVIT-MIN/IRON/FOLIC ACID/K 18-600-40
1 CAPSULE ORAL DAILY
COMMUNITY

## 2020-01-01 RX ORDER — POTASSIUM CHLORIDE 7.45 MG/ML
10 INJECTION INTRAVENOUS
Status: DISCONTINUED | OUTPATIENT
Start: 2020-01-01 | End: 2020-01-01 | Stop reason: HOSPADM

## 2020-01-01 RX ORDER — INDOMETHACIN 50 MG/1
SUPPOSITORY RECTAL PRN
Status: DISCONTINUED | OUTPATIENT
Start: 2020-01-01 | End: 2020-01-01 | Stop reason: HOSPADM

## 2020-01-01 RX ORDER — POTASSIUM CHLORIDE 1.5 G/1.58G
20-40 POWDER, FOR SOLUTION ORAL
Status: DISCONTINUED | OUTPATIENT
Start: 2020-01-01 | End: 2020-01-01 | Stop reason: HOSPADM

## 2020-01-01 RX ORDER — IOPAMIDOL 755 MG/ML
84 INJECTION, SOLUTION INTRAVASCULAR ONCE
Status: COMPLETED | OUTPATIENT
Start: 2020-01-01 | End: 2020-01-01

## 2020-01-01 RX ORDER — ONDANSETRON 2 MG/ML
4 INJECTION INTRAMUSCULAR; INTRAVENOUS EVERY 6 HOURS PRN
Status: DISCONTINUED | OUTPATIENT
Start: 2020-01-01 | End: 2020-01-01

## 2020-01-01 RX ORDER — AMOXICILLIN 250 MG
1 CAPSULE ORAL AT BEDTIME
Status: DISCONTINUED | OUTPATIENT
Start: 2020-01-01 | End: 2020-01-01 | Stop reason: HOSPADM

## 2020-01-01 RX ORDER — INDOMETHACIN 50 MG/1
100 SUPPOSITORY RECTAL
Status: DISCONTINUED | OUTPATIENT
Start: 2020-01-01 | End: 2020-01-01 | Stop reason: HOSPADM

## 2020-01-01 RX ORDER — POTASSIUM CHLORIDE 750 MG/1
10 TABLET, EXTENDED RELEASE ORAL DAILY
Status: ON HOLD | COMMUNITY
Start: 2020-01-01 | End: 2020-01-01

## 2020-01-01 RX ORDER — DEXAMETHASONE SODIUM PHOSPHATE 4 MG/ML
INJECTION, SOLUTION INTRA-ARTICULAR; INTRALESIONAL; INTRAMUSCULAR; INTRAVENOUS; SOFT TISSUE PRN
Status: DISCONTINUED | OUTPATIENT
Start: 2020-01-01 | End: 2020-01-01

## 2020-01-01 RX ORDER — FENTANYL CITRATE 50 UG/ML
25-50 INJECTION, SOLUTION INTRAMUSCULAR; INTRAVENOUS
Status: DISCONTINUED | OUTPATIENT
Start: 2020-01-01 | End: 2020-01-01 | Stop reason: HOSPADM

## 2020-01-01 RX ORDER — HYDROMORPHONE HYDROCHLORIDE 1 MG/ML
.3-.5 INJECTION, SOLUTION INTRAMUSCULAR; INTRAVENOUS; SUBCUTANEOUS EVERY 5 MIN PRN
Status: DISCONTINUED | OUTPATIENT
Start: 2020-01-01 | End: 2020-01-01 | Stop reason: HOSPADM

## 2020-01-01 RX ORDER — KETAMINE HYDROCHLORIDE 10 MG/ML
INJECTION INTRAMUSCULAR; INTRAVENOUS PRN
Status: DISCONTINUED | OUTPATIENT
Start: 2020-01-01 | End: 2020-01-01

## 2020-01-01 RX ORDER — MEPERIDINE HYDROCHLORIDE 25 MG/ML
12.5 INJECTION INTRAMUSCULAR; INTRAVENOUS; SUBCUTANEOUS
Status: DISCONTINUED | OUTPATIENT
Start: 2020-01-01 | End: 2020-01-01 | Stop reason: HOSPADM

## 2020-01-01 RX ORDER — SODIUM CHLORIDE, SODIUM LACTATE, POTASSIUM CHLORIDE, CALCIUM CHLORIDE 600; 310; 30; 20 MG/100ML; MG/100ML; MG/100ML; MG/100ML
INJECTION, SOLUTION INTRAVENOUS CONTINUOUS PRN
Status: DISCONTINUED | OUTPATIENT
Start: 2020-01-01 | End: 2020-01-01

## 2020-01-01 RX ORDER — METOPROLOL TARTRATE 1 MG/ML
1-2 INJECTION, SOLUTION INTRAVENOUS EVERY 5 MIN PRN
Status: DISCONTINUED | OUTPATIENT
Start: 2020-01-01 | End: 2020-01-01 | Stop reason: HOSPADM

## 2020-01-01 RX ORDER — PROCHLORPERAZINE 25 MG
12.5 SUPPOSITORY, RECTAL RECTAL EVERY 12 HOURS PRN
Status: DISCONTINUED | OUTPATIENT
Start: 2020-01-01 | End: 2020-01-01 | Stop reason: HOSPADM

## 2020-01-01 RX ORDER — FENTANYL CITRATE 50 UG/ML
INJECTION, SOLUTION INTRAMUSCULAR; INTRAVENOUS PRN
Status: DISCONTINUED | OUTPATIENT
Start: 2020-01-01 | End: 2020-01-01

## 2020-01-01 RX ORDER — PROPOFOL 10 MG/ML
INJECTION, EMULSION INTRAVENOUS PRN
Status: DISCONTINUED | OUTPATIENT
Start: 2020-01-01 | End: 2020-01-01

## 2020-01-01 RX ORDER — FLUMAZENIL 0.1 MG/ML
0.2 INJECTION, SOLUTION INTRAVENOUS
Status: DISCONTINUED | OUTPATIENT
Start: 2020-01-01 | End: 2020-01-01 | Stop reason: HOSPADM

## 2020-01-01 RX ORDER — LIDOCAINE 40 MG/G
CREAM TOPICAL
Status: DISCONTINUED | OUTPATIENT
Start: 2020-01-01 | End: 2020-01-01 | Stop reason: HOSPADM

## 2020-01-01 RX ORDER — ONDANSETRON 4 MG/1
4 TABLET, ORALLY DISINTEGRATING ORAL EVERY 30 MIN PRN
Status: DISCONTINUED | OUTPATIENT
Start: 2020-01-01 | End: 2020-01-01 | Stop reason: HOSPADM

## 2020-01-01 RX ORDER — ACETAMINOPHEN 325 MG/1
975 TABLET ORAL EVERY 8 HOURS
Status: DISCONTINUED | OUTPATIENT
Start: 2020-01-01 | End: 2020-01-01 | Stop reason: HOSPADM

## 2020-01-01 RX ORDER — SODIUM CHLORIDE 9 MG/ML
INJECTION, SOLUTION INTRAVENOUS CONTINUOUS
Status: DISCONTINUED | OUTPATIENT
Start: 2020-01-01 | End: 2020-01-01

## 2020-01-01 RX ORDER — OXYCODONE HYDROCHLORIDE 10 MG/1
5 TABLET ORAL EVERY 4 HOURS PRN
COMMUNITY

## 2020-01-01 RX ORDER — ACETAMINOPHEN 325 MG/1
650 TABLET ORAL EVERY 4 HOURS PRN
Status: DISCONTINUED | OUTPATIENT
Start: 2020-01-01 | End: 2020-01-01 | Stop reason: HOSPADM

## 2020-01-01 RX ORDER — SODIUM CHLORIDE, SODIUM LACTATE, POTASSIUM CHLORIDE, CALCIUM CHLORIDE 600; 310; 30; 20 MG/100ML; MG/100ML; MG/100ML; MG/100ML
INJECTION, SOLUTION INTRAVENOUS CONTINUOUS
Status: CANCELLED | OUTPATIENT
Start: 2020-01-01

## 2020-01-01 RX ORDER — POLYETHYLENE GLYCOL 3350 17 G/17G
17 POWDER, FOR SOLUTION ORAL DAILY PRN
Status: DISCONTINUED | OUTPATIENT
Start: 2020-01-01 | End: 2020-01-01 | Stop reason: HOSPADM

## 2020-01-01 RX ORDER — NALOXONE HYDROCHLORIDE 0.4 MG/ML
.1-.4 INJECTION, SOLUTION INTRAMUSCULAR; INTRAVENOUS; SUBCUTANEOUS
Status: DISCONTINUED | OUTPATIENT
Start: 2020-01-01 | End: 2020-01-01

## 2020-01-01 RX ORDER — HYDROMORPHONE HYDROCHLORIDE 1 MG/ML
0.3 INJECTION, SOLUTION INTRAMUSCULAR; INTRAVENOUS; SUBCUTANEOUS EVERY 4 HOURS PRN
Status: DISCONTINUED | OUTPATIENT
Start: 2020-01-01 | End: 2020-01-01 | Stop reason: HOSPADM

## 2020-01-01 RX ORDER — POLYETHYLENE GLYCOL 3350 17 G/17G
17 POWDER, FOR SOLUTION ORAL DAILY PRN
Status: DISCONTINUED | OUTPATIENT
Start: 2020-01-01 | End: 2020-01-01

## 2020-01-01 RX ORDER — OXYCODONE HYDROCHLORIDE 5 MG/1
10 TABLET ORAL EVERY 4 HOURS PRN
Status: DISCONTINUED | OUTPATIENT
Start: 2020-01-01 | End: 2020-01-01

## 2020-01-01 RX ORDER — ONDANSETRON 4 MG/1
4 TABLET, ORALLY DISINTEGRATING ORAL EVERY 6 HOURS PRN
Status: DISCONTINUED | OUTPATIENT
Start: 2020-01-01 | End: 2020-01-01 | Stop reason: HOSPADM

## 2020-01-01 RX ORDER — LEVOFLOXACIN 5 MG/ML
INJECTION, SOLUTION INTRAVENOUS PRN
Status: DISCONTINUED | OUTPATIENT
Start: 2020-01-01 | End: 2020-01-01

## 2020-01-01 RX ORDER — POLYETHYLENE GLYCOL 3350 17 G/17G
17 POWDER, FOR SOLUTION ORAL DAILY
Status: DISCONTINUED | OUTPATIENT
Start: 2020-01-01 | End: 2020-01-01 | Stop reason: HOSPADM

## 2020-01-01 RX ORDER — ONDANSETRON 4 MG/1
4 TABLET, ORALLY DISINTEGRATING ORAL EVERY 6 HOURS PRN
Status: DISCONTINUED | OUTPATIENT
Start: 2020-01-01 | End: 2020-01-01

## 2020-01-01 RX ORDER — METOCLOPRAMIDE HYDROCHLORIDE 5 MG/ML
5 INJECTION INTRAMUSCULAR; INTRAVENOUS EVERY 6 HOURS PRN
Status: DISCONTINUED | OUTPATIENT
Start: 2020-01-01 | End: 2020-01-01 | Stop reason: HOSPADM

## 2020-01-01 RX ORDER — INDOMETHACIN 50 MG/1
100 SUPPOSITORY RECTAL
Status: COMPLETED | OUTPATIENT
Start: 2020-01-01 | End: 2020-01-01

## 2020-01-01 RX ORDER — ACETAMINOPHEN 500 MG
500-1000 TABLET ORAL EVERY 6 HOURS PRN
COMMUNITY
Start: 2018-08-01

## 2020-01-01 RX ORDER — LIDOCAINE 40 MG/G
CREAM TOPICAL
Status: CANCELLED | OUTPATIENT
Start: 2020-01-01

## 2020-01-01 RX ORDER — LABETALOL 20 MG/4 ML (5 MG/ML) INTRAVENOUS SYRINGE
10
Status: DISCONTINUED | OUTPATIENT
Start: 2020-01-01 | End: 2020-01-01 | Stop reason: HOSPADM

## 2020-01-01 RX ORDER — PROCHLORPERAZINE MALEATE 5 MG
5 TABLET ORAL EVERY 6 HOURS PRN
Status: DISCONTINUED | OUTPATIENT
Start: 2020-01-01 | End: 2020-01-01 | Stop reason: HOSPADM

## 2020-01-01 RX ORDER — EPINEPHRINE IN SOD CHLOR,ISO 1 MG/10 ML
SYRINGE (ML) INTRAVENOUS PRN
Status: DISCONTINUED | OUTPATIENT
Start: 2020-01-01 | End: 2020-01-01 | Stop reason: HOSPADM

## 2020-01-01 RX ORDER — AMOXICILLIN 250 MG
2 CAPSULE ORAL 2 TIMES DAILY PRN
Status: DISCONTINUED | OUTPATIENT
Start: 2020-01-01 | End: 2020-01-01 | Stop reason: HOSPADM

## 2020-01-01 RX ORDER — POTASSIUM CHLORIDE 750 MG/1
20-40 TABLET, EXTENDED RELEASE ORAL
Status: DISCONTINUED | OUTPATIENT
Start: 2020-01-01 | End: 2020-01-01 | Stop reason: HOSPADM

## 2020-01-01 RX ORDER — ONDANSETRON 2 MG/ML
INJECTION INTRAMUSCULAR; INTRAVENOUS PRN
Status: DISCONTINUED | OUTPATIENT
Start: 2020-01-01 | End: 2020-01-01

## 2020-01-01 RX ORDER — HYDROMORPHONE HYDROCHLORIDE 1 MG/ML
.3-.5 INJECTION, SOLUTION INTRAMUSCULAR; INTRAVENOUS; SUBCUTANEOUS
Status: DISCONTINUED | OUTPATIENT
Start: 2020-01-01 | End: 2020-01-01

## 2020-01-01 RX ORDER — METOCLOPRAMIDE HYDROCHLORIDE 5 MG/ML
5 INJECTION INTRAMUSCULAR; INTRAVENOUS ONCE
Status: COMPLETED | OUTPATIENT
Start: 2020-01-01 | End: 2020-01-01

## 2020-01-01 RX ORDER — POTASSIUM CL/LIDO/0.9 % NACL 10MEQ/0.1L
10 INTRAVENOUS SOLUTION, PIGGYBACK (ML) INTRAVENOUS
Status: DISCONTINUED | OUTPATIENT
Start: 2020-01-01 | End: 2020-01-01 | Stop reason: HOSPADM

## 2020-01-01 RX ORDER — DIPHENHYDRAMINE HCL 25 MG
25 CAPSULE ORAL
COMMUNITY

## 2020-01-01 RX ORDER — ACETAMINOPHEN 500 MG
1000 TABLET ORAL EVERY 8 HOURS
Status: CANCELLED | OUTPATIENT
Start: 2020-01-01

## 2020-01-01 RX ORDER — LIDOCAINE 4 G/G
1 PATCH TOPICAL
Status: DISCONTINUED | OUTPATIENT
Start: 2020-01-01 | End: 2020-01-01 | Stop reason: HOSPADM

## 2020-01-01 RX ORDER — ONDANSETRON 2 MG/ML
4 INJECTION INTRAMUSCULAR; INTRAVENOUS EVERY 30 MIN PRN
Status: DISCONTINUED | OUTPATIENT
Start: 2020-01-01 | End: 2020-01-01

## 2020-01-01 RX ORDER — ACETAMINOPHEN 650 MG/1
650 SUPPOSITORY RECTAL EVERY 4 HOURS PRN
Status: DISCONTINUED | OUTPATIENT
Start: 2020-01-01 | End: 2020-01-01 | Stop reason: HOSPADM

## 2020-01-01 RX ORDER — AMOXICILLIN 250 MG
1 CAPSULE ORAL DAILY PRN
COMMUNITY

## 2020-01-01 RX ORDER — ONDANSETRON 2 MG/ML
4 INJECTION INTRAMUSCULAR; INTRAVENOUS EVERY 6 HOURS PRN
Status: DISCONTINUED | OUTPATIENT
Start: 2020-01-01 | End: 2020-01-01 | Stop reason: HOSPADM

## 2020-01-01 RX ORDER — POTASSIUM CHLORIDE 29.8 MG/ML
20 INJECTION INTRAVENOUS
Status: DISCONTINUED | OUTPATIENT
Start: 2020-01-01 | End: 2020-01-01 | Stop reason: HOSPADM

## 2020-01-01 RX ORDER — HYDROMORPHONE HCL IN WATER/PF 6 MG/30 ML
.2-.4 PATIENT CONTROLLED ANALGESIA SYRINGE INTRAVENOUS
Status: DISCONTINUED | OUTPATIENT
Start: 2020-01-01 | End: 2020-01-01 | Stop reason: HOSPADM

## 2020-01-01 RX ORDER — FENTANYL CITRATE 50 UG/ML
INJECTION, SOLUTION INTRAMUSCULAR; INTRAVENOUS PRN
Status: DISCONTINUED | OUTPATIENT
Start: 2020-01-01 | End: 2020-01-01 | Stop reason: HOSPADM

## 2020-01-01 RX ORDER — OXYCODONE HYDROCHLORIDE 5 MG/1
5-10 TABLET ORAL EVERY 4 HOURS PRN
Status: DISCONTINUED | OUTPATIENT
Start: 2020-01-01 | End: 2020-01-01 | Stop reason: HOSPADM

## 2020-01-01 RX ORDER — ONDANSETRON 2 MG/ML
4 INJECTION INTRAMUSCULAR; INTRAVENOUS
Status: DISCONTINUED | OUTPATIENT
Start: 2020-01-01 | End: 2020-01-01 | Stop reason: HOSPADM

## 2020-01-01 RX ORDER — SENNOSIDES 8.6 MG
8.6 TABLET ORAL DAILY
Status: DISCONTINUED | OUTPATIENT
Start: 2020-01-01 | End: 2020-01-01 | Stop reason: HOSPADM

## 2020-01-01 RX ORDER — TRAMADOL HYDROCHLORIDE 50 MG/1
50 TABLET ORAL EVERY 6 HOURS PRN
COMMUNITY
Start: 2018-08-01 | End: 2020-01-01

## 2020-01-01 RX ORDER — HYDRALAZINE HYDROCHLORIDE 20 MG/ML
2.5-5 INJECTION INTRAMUSCULAR; INTRAVENOUS EVERY 10 MIN PRN
Status: DISCONTINUED | OUTPATIENT
Start: 2020-01-01 | End: 2020-01-01 | Stop reason: HOSPADM

## 2020-01-01 RX ORDER — AMOXICILLIN 250 MG
1 CAPSULE ORAL 2 TIMES DAILY PRN
Status: DISCONTINUED | OUTPATIENT
Start: 2020-01-01 | End: 2020-01-01 | Stop reason: HOSPADM

## 2020-01-01 RX ORDER — FLUMAZENIL 0.1 MG/ML
0.2 INJECTION, SOLUTION INTRAVENOUS
Status: ACTIVE | OUTPATIENT
Start: 2020-01-01 | End: 2020-01-01

## 2020-01-01 RX ORDER — METOPROLOL SUCCINATE 25 MG/1
25 TABLET, EXTENDED RELEASE ORAL DAILY
Status: DISCONTINUED | OUTPATIENT
Start: 2020-01-01 | End: 2020-01-01 | Stop reason: HOSPADM

## 2020-01-01 RX ORDER — PROCHLORPERAZINE MALEATE 5 MG
5-10 TABLET ORAL EVERY 6 HOURS PRN
COMMUNITY
Start: 2020-01-01

## 2020-01-01 RX ORDER — INDOMETHACIN 50 MG/1
100 SUPPOSITORY RECTAL
Status: CANCELLED | OUTPATIENT
Start: 2020-01-01

## 2020-01-01 RX ORDER — HYDROMORPHONE HYDROCHLORIDE 1 MG/ML
.3-.5 INJECTION, SOLUTION INTRAMUSCULAR; INTRAVENOUS; SUBCUTANEOUS EVERY 10 MIN PRN
Status: DISCONTINUED | OUTPATIENT
Start: 2020-01-01 | End: 2020-01-01 | Stop reason: HOSPADM

## 2020-01-01 RX ORDER — DEXTROSE, SODIUM CHLORIDE, SODIUM LACTATE, POTASSIUM CHLORIDE, AND CALCIUM CHLORIDE 5; .6; .31; .03; .02 G/100ML; G/100ML; G/100ML; G/100ML; G/100ML
INJECTION, SOLUTION INTRAVENOUS CONTINUOUS
Status: DISCONTINUED | OUTPATIENT
Start: 2020-01-01 | End: 2020-01-01

## 2020-01-01 RX ADMIN — ACETAMINOPHEN 975 MG: 325 TABLET, FILM COATED ORAL at 12:21

## 2020-01-01 RX ADMIN — SUGAMMADEX 400 MG: 100 INJECTION, SOLUTION INTRAVENOUS at 11:00

## 2020-01-01 RX ADMIN — RIVAROXABAN 20 MG: 20 TABLET, FILM COATED ORAL at 22:20

## 2020-01-01 RX ADMIN — Medication 70 MG: at 08:37

## 2020-01-01 RX ADMIN — PROPOFOL 130 MG: 10 INJECTION, EMULSION INTRAVENOUS at 10:06

## 2020-01-01 RX ADMIN — HYDROMORPHONE HYDROCHLORIDE 0.2 MG: 0.2 INJECTION, SOLUTION INTRAMUSCULAR; INTRAVENOUS; SUBCUTANEOUS at 17:31

## 2020-01-01 RX ADMIN — SODIUM CHLORIDE, SODIUM LACTATE, POTASSIUM CHLORIDE, CALCIUM CHLORIDE AND DEXTROSE MONOHYDRATE: 5; 600; 310; 30; 20 INJECTION, SOLUTION INTRAVENOUS at 12:09

## 2020-01-01 RX ADMIN — ACETAMINOPHEN 975 MG: 325 TABLET, FILM COATED ORAL at 03:04

## 2020-01-01 RX ADMIN — MIDAZOLAM 1 MG: 1 INJECTION INTRAMUSCULAR; INTRAVENOUS at 08:18

## 2020-01-01 RX ADMIN — HYDROMORPHONE HYDROCHLORIDE 0.2 MG: 0.2 INJECTION, SOLUTION INTRAMUSCULAR; INTRAVENOUS; SUBCUTANEOUS at 08:52

## 2020-01-01 RX ADMIN — ONDANSETRON 4 MG: 2 INJECTION INTRAMUSCULAR; INTRAVENOUS at 08:57

## 2020-01-01 RX ADMIN — ROCURONIUM BROMIDE 50 MG: 10 INJECTION INTRAVENOUS at 10:06

## 2020-01-01 RX ADMIN — SUGAMMADEX 200 MG: 100 INJECTION, SOLUTION INTRAVENOUS at 10:56

## 2020-01-01 RX ADMIN — METOPROLOL SUCCINATE 25 MG: 25 TABLET, EXTENDED RELEASE ORAL at 08:32

## 2020-01-01 RX ADMIN — POTASSIUM CHLORIDE 40 MEQ: 750 TABLET, EXTENDED RELEASE ORAL at 09:01

## 2020-01-01 RX ADMIN — POLYETHYLENE GLYCOL 3350 17 G: 17 POWDER, FOR SOLUTION ORAL at 08:29

## 2020-01-01 RX ADMIN — SODIUM CHLORIDE, POTASSIUM CHLORIDE, SODIUM LACTATE AND CALCIUM CHLORIDE: 600; 310; 30; 20 INJECTION, SOLUTION INTRAVENOUS at 15:43

## 2020-01-01 RX ADMIN — DEXAMETHASONE SODIUM PHOSPHATE 4 MG: 4 INJECTION, SOLUTION INTRA-ARTICULAR; INTRALESIONAL; INTRAMUSCULAR; INTRAVENOUS; SOFT TISSUE at 16:23

## 2020-01-01 RX ADMIN — PHENYLEPHRINE HYDROCHLORIDE 100 MCG: 10 INJECTION INTRAVENOUS at 08:52

## 2020-01-01 RX ADMIN — SODIUM CHLORIDE: 9 INJECTION, SOLUTION INTRAVENOUS at 15:24

## 2020-01-01 RX ADMIN — ONDANSETRON 4 MG: 2 INJECTION INTRAMUSCULAR; INTRAVENOUS at 07:56

## 2020-01-01 RX ADMIN — METOPROLOL SUCCINATE 25 MG: 25 TABLET, EXTENDED RELEASE ORAL at 08:25

## 2020-01-01 RX ADMIN — ACETAMINOPHEN 975 MG: 325 TABLET, FILM COATED ORAL at 11:35

## 2020-01-01 RX ADMIN — ONDANSETRON 4 MG: 2 INJECTION INTRAMUSCULAR; INTRAVENOUS at 18:57

## 2020-01-01 RX ADMIN — PANCRELIPASE 2 CAPSULE: 24000; 76000; 120000 CAPSULE, DELAYED RELEASE PELLETS ORAL at 14:21

## 2020-01-01 RX ADMIN — ROCURONIUM BROMIDE 10 MG: 10 INJECTION INTRAVENOUS at 09:30

## 2020-01-01 RX ADMIN — SODIUM CHLORIDE, POTASSIUM CHLORIDE, SODIUM LACTATE AND CALCIUM CHLORIDE: 600; 310; 30; 20 INJECTION, SOLUTION INTRAVENOUS at 07:48

## 2020-01-01 RX ADMIN — SODIUM CHLORIDE, POTASSIUM CHLORIDE, SODIUM LACTATE AND CALCIUM CHLORIDE: 600; 310; 30; 20 INJECTION, SOLUTION INTRAVENOUS at 20:24

## 2020-01-01 RX ADMIN — SENNOSIDES 8.6 MG: 8.6 TABLET, FILM COATED ORAL at 11:13

## 2020-01-01 RX ADMIN — RIVAROXABAN 20 MG: 20 TABLET, FILM COATED ORAL at 17:28

## 2020-01-01 RX ADMIN — HYDROMORPHONE HYDROCHLORIDE 0.2 MG: 0.2 INJECTION, SOLUTION INTRAMUSCULAR; INTRAVENOUS; SUBCUTANEOUS at 13:28

## 2020-01-01 RX ADMIN — IOPAMIDOL 84 ML: 755 INJECTION, SOLUTION INTRAVENOUS at 16:05

## 2020-01-01 RX ADMIN — OMEPRAZOLE 20 MG: 20 CAPSULE, DELAYED RELEASE ORAL at 16:35

## 2020-01-01 RX ADMIN — SODIUM CHLORIDE, POTASSIUM CHLORIDE, SODIUM LACTATE AND CALCIUM CHLORIDE: 600; 310; 30; 20 INJECTION, SOLUTION INTRAVENOUS at 17:31

## 2020-01-01 RX ADMIN — ACETAMINOPHEN 975 MG: 325 TABLET, FILM COATED ORAL at 20:18

## 2020-01-01 RX ADMIN — METOPROLOL SUCCINATE 25 MG: 25 TABLET, EXTENDED RELEASE ORAL at 08:28

## 2020-01-01 RX ADMIN — SODIUM CHLORIDE, POTASSIUM CHLORIDE, SODIUM LACTATE AND CALCIUM CHLORIDE: 600; 310; 30; 20 INJECTION, SOLUTION INTRAVENOUS at 09:56

## 2020-01-01 RX ADMIN — HYDROMORPHONE HYDROCHLORIDE 0.2 MG: 0.2 INJECTION, SOLUTION INTRAMUSCULAR; INTRAVENOUS; SUBCUTANEOUS at 03:30

## 2020-01-01 RX ADMIN — DEXAMETHASONE SODIUM PHOSPHATE 8 MG: 4 INJECTION, SOLUTION INTRA-ARTICULAR; INTRALESIONAL; INTRAMUSCULAR; INTRAVENOUS; SOFT TISSUE at 10:22

## 2020-01-01 RX ADMIN — PROCHLORPERAZINE MALEATE 5 MG: 5 TABLET ORAL at 08:29

## 2020-01-01 RX ADMIN — METOPROLOL SUCCINATE 25 MG: 25 TABLET, EXTENDED RELEASE ORAL at 08:37

## 2020-01-01 RX ADMIN — SODIUM CHLORIDE, POTASSIUM CHLORIDE, SODIUM LACTATE AND CALCIUM CHLORIDE: 600; 310; 30; 20 INJECTION, SOLUTION INTRAVENOUS at 23:03

## 2020-01-01 RX ADMIN — ONDANSETRON 4 MG: 2 INJECTION INTRAMUSCULAR; INTRAVENOUS at 11:49

## 2020-01-01 RX ADMIN — DEXAMETHASONE SODIUM PHOSPHATE 4 MG: 4 INJECTION, SOLUTION INTRA-ARTICULAR; INTRALESIONAL; INTRAMUSCULAR; INTRAVENOUS; SOFT TISSUE at 08:46

## 2020-01-01 RX ADMIN — FENTANYL CITRATE 50 MCG: 50 INJECTION, SOLUTION INTRAMUSCULAR; INTRAVENOUS at 10:22

## 2020-01-01 RX ADMIN — SODIUM CHLORIDE, POTASSIUM CHLORIDE, SODIUM LACTATE AND CALCIUM CHLORIDE: 600; 310; 30; 20 INJECTION, SOLUTION INTRAVENOUS at 11:16

## 2020-01-01 RX ADMIN — SODIUM CHLORIDE, POTASSIUM CHLORIDE, SODIUM LACTATE AND CALCIUM CHLORIDE: 600; 310; 30; 20 INJECTION, SOLUTION INTRAVENOUS at 16:35

## 2020-01-01 RX ADMIN — ACETAMINOPHEN 975 MG: 325 TABLET, FILM COATED ORAL at 11:13

## 2020-01-01 RX ADMIN — SODIUM CHLORIDE, POTASSIUM CHLORIDE, SODIUM LACTATE AND CALCIUM CHLORIDE: 600; 310; 30; 20 INJECTION, SOLUTION INTRAVENOUS at 05:52

## 2020-01-01 RX ADMIN — MIDAZOLAM 1 MG: 1 INJECTION INTRAMUSCULAR; INTRAVENOUS at 08:24

## 2020-01-01 RX ADMIN — ACETAMINOPHEN 975 MG: 325 TABLET, FILM COATED ORAL at 03:23

## 2020-01-01 RX ADMIN — PROPOFOL 90 MG: 10 INJECTION, EMULSION INTRAVENOUS at 16:00

## 2020-01-01 RX ADMIN — ACETAMINOPHEN 650 MG: 325 TABLET, FILM COATED ORAL at 02:04

## 2020-01-01 RX ADMIN — LIDOCAINE HYDROCHLORIDE 50 MG: 20 INJECTION, SOLUTION INFILTRATION; PERINEURAL at 07:58

## 2020-01-01 RX ADMIN — Medication 30 MG: at 16:00

## 2020-01-01 RX ADMIN — PANCRELIPASE 48000 UNITS: 24000; 76000; 120000 CAPSULE, DELAYED RELEASE PELLETS ORAL at 08:28

## 2020-01-01 RX ADMIN — SODIUM CHLORIDE, POTASSIUM CHLORIDE, SODIUM LACTATE AND CALCIUM CHLORIDE: 600; 310; 30; 20 INJECTION, SOLUTION INTRAVENOUS at 21:27

## 2020-01-01 RX ADMIN — SUGAMMADEX 150 MG: 100 INJECTION, SOLUTION INTRAVENOUS at 17:47

## 2020-01-01 RX ADMIN — SODIUM CHLORIDE, POTASSIUM CHLORIDE, SODIUM LACTATE AND CALCIUM CHLORIDE: 600; 310; 30; 20 INJECTION, SOLUTION INTRAVENOUS at 10:10

## 2020-01-01 RX ADMIN — ACETAMINOPHEN 975 MG: 325 TABLET, FILM COATED ORAL at 20:40

## 2020-01-01 RX ADMIN — SODIUM CHLORIDE, POTASSIUM CHLORIDE, SODIUM LACTATE AND CALCIUM CHLORIDE: 600; 310; 30; 20 INJECTION, SOLUTION INTRAVENOUS at 03:05

## 2020-01-01 RX ADMIN — HYDROMORPHONE HYDROCHLORIDE 0.3 MG: 1 INJECTION, SOLUTION INTRAMUSCULAR; INTRAVENOUS; SUBCUTANEOUS at 22:36

## 2020-01-01 RX ADMIN — HYDROMORPHONE HYDROCHLORIDE 0.2 MG: 0.2 INJECTION, SOLUTION INTRAMUSCULAR; INTRAVENOUS; SUBCUTANEOUS at 22:42

## 2020-01-01 RX ADMIN — DOCUSATE SODIUM 50 MG AND SENNOSIDES 8.6 MG 1 TABLET: 8.6; 5 TABLET, FILM COATED ORAL at 19:59

## 2020-01-01 RX ADMIN — SODIUM CHLORIDE, POTASSIUM CHLORIDE, SODIUM LACTATE AND CALCIUM CHLORIDE: 600; 310; 30; 20 INJECTION, SOLUTION INTRAVENOUS at 17:59

## 2020-01-01 RX ADMIN — ACETAMINOPHEN 975 MG: 325 TABLET, FILM COATED ORAL at 20:39

## 2020-01-01 RX ADMIN — FENTANYL CITRATE 50 MCG: 50 INJECTION, SOLUTION INTRAMUSCULAR; INTRAVENOUS at 08:37

## 2020-01-01 RX ADMIN — ACETAMINOPHEN 975 MG: 325 TABLET, FILM COATED ORAL at 12:42

## 2020-01-01 RX ADMIN — FENTANYL CITRATE 50 MCG: 50 INJECTION, SOLUTION INTRAMUSCULAR; INTRAVENOUS at 17:34

## 2020-01-01 RX ADMIN — POTASSIUM CHLORIDE 20 MEQ: 750 TABLET, EXTENDED RELEASE ORAL at 10:59

## 2020-01-01 RX ADMIN — LIDOCAINE 1 PATCH: 560 PATCH PERCUTANEOUS; TOPICAL; TRANSDERMAL at 19:59

## 2020-01-01 RX ADMIN — ROCURONIUM BROMIDE 50 MG: 10 INJECTION INTRAVENOUS at 08:53

## 2020-01-01 RX ADMIN — ROCURONIUM BROMIDE 50 MG: 10 INJECTION INTRAVENOUS at 07:58

## 2020-01-01 RX ADMIN — ONDANSETRON 4 MG: 2 INJECTION INTRAMUSCULAR; INTRAVENOUS at 10:22

## 2020-01-01 RX ADMIN — ONDANSETRON 4 MG: 2 INJECTION INTRAMUSCULAR; INTRAVENOUS at 15:35

## 2020-01-01 RX ADMIN — OXYCODONE HYDROCHLORIDE 10 MG: 5 TABLET ORAL at 19:38

## 2020-01-01 RX ADMIN — METOPROLOL SUCCINATE 25 MG: 25 TABLET, EXTENDED RELEASE ORAL at 08:29

## 2020-01-01 RX ADMIN — ACETAMINOPHEN 975 MG: 325 TABLET, FILM COATED ORAL at 19:58

## 2020-01-01 RX ADMIN — PANCRELIPASE 2 CAPSULE: 24000; 76000; 120000 CAPSULE, DELAYED RELEASE PELLETS ORAL at 12:42

## 2020-01-01 RX ADMIN — LIDOCAINE HYDROCHLORIDE 40 MG: 20 INJECTION, SOLUTION INFILTRATION; PERINEURAL at 10:06

## 2020-01-01 RX ADMIN — PROCHLORPERAZINE EDISYLATE 5 MG: 5 INJECTION INTRAMUSCULAR; INTRAVENOUS at 18:11

## 2020-01-01 RX ADMIN — LIDOCAINE 1 PATCH: 560 PATCH PERCUTANEOUS; TOPICAL; TRANSDERMAL at 20:18

## 2020-01-01 RX ADMIN — ACETAMINOPHEN 650 MG: 325 TABLET, FILM COATED ORAL at 22:12

## 2020-01-01 RX ADMIN — LIDOCAINE HYDROCHLORIDE 70 MG: 20 INJECTION, SOLUTION INFILTRATION; PERINEURAL at 08:37

## 2020-01-01 RX ADMIN — SUGAMMADEX 200 MG: 100 INJECTION, SOLUTION INTRAVENOUS at 08:59

## 2020-01-01 RX ADMIN — ACETAMINOPHEN 975 MG: 325 TABLET, FILM COATED ORAL at 22:20

## 2020-01-01 RX ADMIN — LIDOCAINE HYDROCHLORIDE 100 MG: 20 INJECTION, SOLUTION INFILTRATION; PERINEURAL at 16:00

## 2020-01-01 RX ADMIN — ACETAMINOPHEN 975 MG: 325 TABLET, FILM COATED ORAL at 11:46

## 2020-01-01 RX ADMIN — LIDOCAINE 1 PATCH: 560 PATCH PERCUTANEOUS; TOPICAL; TRANSDERMAL at 20:38

## 2020-01-01 RX ADMIN — ACETAMINOPHEN 975 MG: 325 TABLET, FILM COATED ORAL at 04:35

## 2020-01-01 RX ADMIN — HYDROMORPHONE HYDROCHLORIDE 0.5 MG: 1 INJECTION, SOLUTION INTRAMUSCULAR; INTRAVENOUS; SUBCUTANEOUS at 14:16

## 2020-01-01 RX ADMIN — Medication: at 22:16

## 2020-01-01 RX ADMIN — FENTANYL CITRATE 50 MCG: 50 INJECTION, SOLUTION INTRAMUSCULAR; INTRAVENOUS at 16:00

## 2020-01-01 RX ADMIN — SODIUM CHLORIDE, POTASSIUM CHLORIDE, SODIUM LACTATE AND CALCIUM CHLORIDE: 600; 310; 30; 20 INJECTION, SOLUTION INTRAVENOUS at 03:24

## 2020-01-01 RX ADMIN — HYDROMORPHONE HYDROCHLORIDE 0.5 MG: 1 INJECTION, SOLUTION INTRAMUSCULAR; INTRAVENOUS; SUBCUTANEOUS at 12:16

## 2020-01-01 RX ADMIN — OXYCODONE HYDROCHLORIDE 10 MG: 5 TABLET ORAL at 02:05

## 2020-01-01 RX ADMIN — DEXAMETHASONE SODIUM PHOSPHATE 6 MG: 4 INJECTION, SOLUTION INTRA-ARTICULAR; INTRALESIONAL; INTRAMUSCULAR; INTRAVENOUS; SOFT TISSUE at 08:58

## 2020-01-01 RX ADMIN — HYDROMORPHONE HYDROCHLORIDE 0.5 MG: 1 INJECTION, SOLUTION INTRAMUSCULAR; INTRAVENOUS; SUBCUTANEOUS at 11:50

## 2020-01-01 RX ADMIN — METOPROLOL SUCCINATE 25 MG: 25 TABLET, EXTENDED RELEASE ORAL at 08:53

## 2020-01-01 RX ADMIN — SODIUM CHLORIDE, POTASSIUM CHLORIDE, SODIUM LACTATE AND CALCIUM CHLORIDE: 600; 310; 30; 20 INJECTION, SOLUTION INTRAVENOUS at 18:05

## 2020-01-01 RX ADMIN — ACETAMINOPHEN 975 MG: 325 TABLET, FILM COATED ORAL at 03:37

## 2020-01-01 RX ADMIN — ACETAMINOPHEN 975 MG: 325 TABLET, FILM COATED ORAL at 04:29

## 2020-01-01 RX ADMIN — ONDANSETRON 4 MG: 2 INJECTION INTRAMUSCULAR; INTRAVENOUS at 10:34

## 2020-01-01 RX ADMIN — PHENYLEPHRINE HYDROCHLORIDE 100 MCG: 10 INJECTION INTRAVENOUS at 10:24

## 2020-01-01 RX ADMIN — ONDANSETRON 4 MG: 2 INJECTION INTRAMUSCULAR; INTRAVENOUS at 03:44

## 2020-01-01 RX ADMIN — LIDOCAINE 1 PATCH: 560 PATCH PERCUTANEOUS; TOPICAL; TRANSDERMAL at 20:24

## 2020-01-01 RX ADMIN — GLUCAGON HYDROCHLORIDE 0.4 MG: KIT at 08:29

## 2020-01-01 RX ADMIN — PROPOFOL 140 MG: 10 INJECTION, EMULSION INTRAVENOUS at 08:37

## 2020-01-01 RX ADMIN — METOPROLOL SUCCINATE 25 MG: 25 TABLET, EXTENDED RELEASE ORAL at 08:52

## 2020-01-01 RX ADMIN — FENTANYL CITRATE 50 MCG: 50 INJECTION, SOLUTION INTRAMUSCULAR; INTRAVENOUS at 07:58

## 2020-01-01 RX ADMIN — METOCLOPRAMIDE HYDROCHLORIDE 5 MG: 5 INJECTION INTRAMUSCULAR; INTRAVENOUS at 19:42

## 2020-01-01 RX ADMIN — HYDROMORPHONE HYDROCHLORIDE 0.5 MG: 1 INJECTION, SOLUTION INTRAMUSCULAR; INTRAVENOUS; SUBCUTANEOUS at 17:42

## 2020-01-01 RX ADMIN — PROCHLORPERAZINE EDISYLATE 5 MG: 5 INJECTION INTRAMUSCULAR; INTRAVENOUS at 08:51

## 2020-01-01 RX ADMIN — DEXTROSE MONOHYDRATE: 50 INJECTION, SOLUTION INTRAVENOUS at 08:21

## 2020-01-01 RX ADMIN — POLYETHYLENE GLYCOL 3350 17 G: 17 POWDER, FOR SOLUTION ORAL at 08:32

## 2020-01-01 RX ADMIN — MIDAZOLAM 0.5 MG: 1 INJECTION INTRAMUSCULAR; INTRAVENOUS at 19:58

## 2020-01-01 RX ADMIN — PANCRELIPASE 2 CAPSULE: 24000; 76000; 120000 CAPSULE, DELAYED RELEASE PELLETS ORAL at 08:32

## 2020-01-01 RX ADMIN — HYDROMORPHONE HYDROCHLORIDE 0.5 MG: 1 INJECTION, SOLUTION INTRAMUSCULAR; INTRAVENOUS; SUBCUTANEOUS at 15:33

## 2020-01-01 RX ADMIN — SODIUM PHOSPHATE 1 ENEMA: 7; 19 ENEMA RECTAL at 16:35

## 2020-01-01 RX ADMIN — PROCHLORPERAZINE EDISYLATE 5 MG: 5 INJECTION INTRAMUSCULAR; INTRAVENOUS at 09:44

## 2020-01-01 RX ADMIN — OMEPRAZOLE 20 MG: 20 CAPSULE, DELAYED RELEASE ORAL at 08:53

## 2020-01-01 RX ADMIN — ONDANSETRON 4 MG: 2 INJECTION INTRAMUSCULAR; INTRAVENOUS at 17:24

## 2020-01-01 RX ADMIN — PANCRELIPASE 2 CAPSULE: 24000; 76000; 120000 CAPSULE, DELAYED RELEASE PELLETS ORAL at 18:44

## 2020-01-01 RX ADMIN — METOCLOPRAMIDE HYDROCHLORIDE 5 MG: 5 INJECTION INTRAMUSCULAR; INTRAVENOUS at 19:18

## 2020-01-01 RX ADMIN — PROPOFOL 120 MG: 10 INJECTION, EMULSION INTRAVENOUS at 07:58

## 2020-01-01 RX ADMIN — ACETAMINOPHEN 975 MG: 325 TABLET, FILM COATED ORAL at 04:05

## 2020-01-01 RX ADMIN — PANCRELIPASE 2 CAPSULE: 24000; 76000; 120000 CAPSULE, DELAYED RELEASE PELLETS ORAL at 18:11

## 2020-01-01 RX ADMIN — SODIUM CHLORIDE, SODIUM LACTATE, POTASSIUM CHLORIDE, CALCIUM CHLORIDE AND DEXTROSE MONOHYDRATE: 5; 600; 310; 30; 20 INJECTION, SOLUTION INTRAVENOUS at 19:32

## 2020-01-01 RX ADMIN — ROCURONIUM BROMIDE 40 MG: 10 INJECTION INTRAVENOUS at 16:00

## 2020-01-01 RX ADMIN — SODIUM CHLORIDE, POTASSIUM CHLORIDE, SODIUM LACTATE AND CALCIUM CHLORIDE: 600; 310; 30; 20 INJECTION, SOLUTION INTRAVENOUS at 08:18

## 2020-01-01 RX ADMIN — SODIUM CHLORIDE 1000 ML: 9 INJECTION, SOLUTION INTRAVENOUS at 11:49

## 2020-01-01 RX ADMIN — RIVAROXABAN 20 MG: 20 TABLET, FILM COATED ORAL at 17:04

## 2020-01-01 RX ADMIN — POLYETHYLENE GLYCOL 3350 17 G: 17 POWDER, FOR SOLUTION ORAL at 09:04

## 2020-01-01 RX ADMIN — LEVOFLOXACIN 500 MG: 5 INJECTION, SOLUTION INTRAVENOUS at 16:20

## 2020-01-01 RX ADMIN — ONDANSETRON 4 MG: 2 INJECTION INTRAMUSCULAR; INTRAVENOUS at 19:15

## 2020-01-01 ASSESSMENT — ACTIVITIES OF DAILY LIVING (ADL)
ADLS_ACUITY_SCORE: 11
ADLS_ACUITY_SCORE: 13
ADLS_ACUITY_SCORE: 13
ADLS_ACUITY_SCORE: 11
ADLS_ACUITY_SCORE: 12
ADLS_ACUITY_SCORE: 11
ADLS_ACUITY_SCORE: 11
ADLS_ACUITY_SCORE: 13
ADLS_ACUITY_SCORE: 11

## 2020-01-01 ASSESSMENT — ENCOUNTER SYMPTOMS
LIGHT-HEADEDNESS: 0
FATIGUE: 0
NAUSEA: 1
SHORTNESS OF BREATH: 0
TROUBLE SWALLOWING: 0
ABDOMINAL DISTENTION: 0
FEVER: 0
CONSTIPATION: 1
BACK PAIN: 0
VOMITING: 1
BLOOD IN STOOL: 0
DYSURIA: 0
DYSPHORIC MOOD: 0
HEADACHES: 0
WOUND: 0
DIARRHEA: 0
COUGH: 0
DIFFICULTY URINATING: 0
ABDOMINAL PAIN: 1

## 2020-01-01 ASSESSMENT — MIFFLIN-ST. JEOR
SCORE: 1145.04
SCORE: 1143.68
SCORE: 1132.79
SCORE: 1141
SCORE: 1118.27
SCORE: 1119.63
SCORE: 1122.36
SCORE: 1135.51
SCORE: 1191
SCORE: 1146.4

## 2020-01-01 ASSESSMENT — PAIN SCALES - GENERAL
PAINLEVEL: MODERATE PAIN (4)
PAINLEVEL: NO PAIN (0)

## 2020-04-03 NOTE — TELEPHONE ENCOUNTER
Action 4/3/20 3:11 PM - Sammi   Action Taken  Imaging Disc received from [facility] and sent to be uploaded into PACs

## 2020-04-06 NOTE — PROGRESS NOTES
Records uploaded to chart.    Called [facility] for CT image- fax sent for request, they will mail.     Called [facility] - request faxed.    Maite Corona RN Care Coordinator

## 2020-04-08 NOTE — PROGRESS NOTES
"Amanda Sandoval is a 70 year old female who is being evaluated via a billable video visit.      The patient has been notified of following:     \"This video visit will be conducted via a call between you and your physician/provider. We have found that certain health care needs can be provided without the need for an in-person physical exam.  This service lets us provide the care you need with a video conversation.  If a prescription is necessary we can send it directly to your pharmacy.  If lab work is needed we can place an order for that and you can then stop by our lab to have the test done at a later time.    Video visits are billed at different rates depending on your insurance coverage.  Please reach out to your insurance provider with any questions.    If during the course of the call the physician/provider feels a video visit is not appropriate, you will not be charged for this service.\"    Patient has given verbal consent for Video visit? Yes    Patient would like the video invitation sent by: Send to e-mail at: [email or web address]    Video Start Time:  15:30    Amanda aSndoval complains of    Chief Complaint   Patient presents with     Consult     consult for panc       I have reviewed and updated the patient's Past Medical History, Social History, Family History and Medication List.    ALLERGIES  Penicillins; Erythromycin; Oxycodone; and Nickel       Amanda is a very pleasant 70-year-old referred by Dr. Natasha Steinberg for evaluation of recurrent acute pancreatitis.  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  She has an extensive family tree.  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  However she has been remarkably healthy all her life with no specific issues other than hypertension and on no medicines other than metoprolol.  She never had any pancreas or biliary disease " that she knew of until February of this year.  She was admitted to the hospital in [facility] with acute pancreatitis that was painful I believe in for 5 days had CT scan showing interstitial pancreatitis With the inflammatory stranding all around the pancreas.  She had MRCP there and then I believe a second admission but on the way home from [place] became ill again while passing through [place].  There she was admitted for another bout of pancreatitis all the imaging repeated as copied and pasted below and all the work-up as listed.  She had an endoscopic ultrasound that apparently confirmed gallbladder sludge that was seen on a transcutaneous ultrasound, mildly dilated bile duct and pancreas to be some.  Surprisingly she underwent a minor papillotomy precut and pancreatic stent by the gastroenterologist in [place].  He placed a 7 Swedish by 12 cm stent in her dorsal pancreatic duct.  She felt somewhat better afterwards but is had lingering pain ever since.  She then made at home where she had low-grade upper abdominal to left-sided abdominal pain on a daily basis though not requiring hospital cessation or medicines until last weekend when she was admitted to [facility] with worsened pain elevated lipase as listed below that with us about 5 times the upper limit of normal and an elevated C-reactive protein.    Earlier in [place] 2 weeks ago she was diagnosed by CT with acute portal vein thrombosis and also started on anticoagulation.  That was after the ERCP there.  She has been on Xarelto ever since.  While in [place] she was on a heparin drip and now converted back to Xarelto.    Her symptoms consist of ongoing dull pain in the epigastrium which is never relieved and is worse somewhat with meals.  She has no fevers.  Her other health is remarkably good as listed below.  Review of systems suggest she has lost about 20 pounds and has difficulty eating but other than that  is in relatively good health    The rest of the details of her imaging and work-up are listed below.    Impression we spent 60 minutes on a video call more than 50% counseling.  The rest was spent in reviewing her extensive hospitalizations and interviewing the patient.  In some she has PRS S1 related hereditary pancreatitis but was remarkably asymptomatic until very late in life which is unusual.  She had evidence of interstitial pancreatitis, pancreas to be some with a dilated dorsal duct, dilated bile duct, and biliary sludge she is also had abnormal liver tests at times.  Its likely but not certain that this pancreatitis episodes were due to PRS S1 related chronic pancreatitis.  They may have been interaction with the pancreas to be seen and possibly that this is pseudo-to be some and she has biliary pancreatitis.  Surprisingly she had very aggressive endoscopic intervention and I suspect that her ongoing pain now is related to a very large and rigid stent in embedded in her pancreatic duct.  She also has acute portal vein thrombosis and is on anticoagulation.  We discussed that that stent needs to come out relatively soon as its likely causing stent induced injury and of no benefit after a minor papillotomy.  Its hopes that when the stent comes out she will have some improvement in her chronic pain.  Is also unclear without reviewing the actual images whether she really has pancreas to be some worse pseudo-to be some due to a tumor and/or biliary sludge disease as cause of her pain.  We did discuss that she is at increased risk of pancreatic cancer with this genotype and that 3 of her distant relatives  of pancreatic cancer.  She is not a smoker or drinker.    Plan is arrange a combination EUS ERCP within the next week.  She will need to stop the Xarelto 2 days before.  If the EUS confirms bile duct sludge in addition to gallbladder sludge then we will not only remove the pancreatic stent and replace it  with a small self injecting pancreatic stent but also perform biliary sphincterotomy.  That would mean with holding her Xarelto yet 3 more days so will need pretty good evidence of biliary obstruction to do that at this time.  Long-term if she does not improve we will consider repeat pancreatic intervention, biliary intervention either cholecystectomy or biliary sphincterotomy, and also institution of pancreatic enzyme replacement therapy to help with meal related pain.  We will also continue to follow her and eventually repeat imaging to assess whether there is any underlying second problem beyond those described above.    Galina, 4/6-4/7/2020  INFLAMMATORY MARKERS  Component Name  4/7/2020 4/6/2020 5/15/2017         7   1.16 (H) 0.73 (H)        168 (H) 62 71         55   0.6 0.4   0.7   32 47 (H)   17   35 49 (H)   14    lipase 334.3 (H)         Reason for Visit    Reason Comments   Abdominal Pain          Auth/Cert  Auth/Cert   Status Reason Specialty Diagnoses / Procedures Referred By Contact Referred To Contact       Oncology Diagnoses    Portal vein thrombosis    Acute pancreatitis    Acute abdominal pain    Elevated blood pressure reading    Anemia, unspecified type    Non-intractable vomiting with nausea, unspecified vomiting type                            Portal vein thrombosis        [facility]    [address] [place] [address]    Phone: [phone]    Fax: [phone]          Encounter Details    Date Type Department Care Team Description   03/27/2020 Surgery [facility]    [address] [place] [address] [phone]   Vasyl Bright MD    [address]    [facility] [address]    [place] [address] [phone] (Fax)   EGD w/anes     Surgery Details    Date/Time Status Location OR Service Patient Class Case Class Case Type Trauma Case?   3/27/20 12:32 PM Posted [facility]  "[facility] 5 Gastroenterology Inpatient Urgent         Panel 1 Procedure [facility] Anes Op Region Wound Class Comments   EGD w/anes N/A              Surgeon Surgeon Role Service Panel   Vasyl Bright MD Primary Gastroenterology 1     Social History  - documented as of this encounter  Tobacco Use Types Packs/Day Years Used Date   Never Smoker           Smokeless Tobacco: Never Used           Alcohol Use Drinks/Week oz/Week Comments   Not Currently           Sex Assigned at Birth Date Recorded   Not on file       Job Start Date Occupation Industry   Not on file Not on file Not on file     Travel History Travel Start Travel End   No recent travel history available.       COVID-19 Exposure Response Date Recorded   In the last month, have you been in contact with someone who was confirmed or suspected to have Coronavirus / COVID-19? No / Unsure 3/26/2020 10:28 AM MDT     Last Filed Vital Signs  - documented in this encounter  Vital Sign Reading Time Taken Comments   Blood Pressure 136/81 2020 7:38 AM MDT     Pulse 84 2020 7:38 AM MDT     Temperature 37.1  C (98.8  F) 2020 7:38 AM MDT     Respiratory Rate 18 2020 7:38 AM MDT     Oxygen Saturation 96% 2020 7:38 AM MDT     Inhaled Oxygen Concentration - -     Weight 66.8 kg (147 lb 4.3 oz) 2020 8:18 PM MDT     Height 162.6 cm (5' 4\") 2020 10:33 AM MDT     Body Mass Index 25.28 2020 10:33 AM MDT       Discharge Summaries  - documented in this encounter  Josh Mckeon MD - 2020 9:45 AM MDT  Formatting of this note might be different from the original.  Evaluate present [facility]    Patient Name: Amanda Sandoval  Medical Record Number: [id number] : 1950   Admit Date: 3/26/2020 Discharge Date: 3/28/2020    Service Team: Hospitalist A     Final Diagnoses  Principal Problem:  Portal vein thrombosis  Active Problems:  Acute on chronic pancreatitis (HC code)  Hypokalemia  Normocytic anemia  Essential " hypertension    Key Points for Follow Up: Follow-up to PCP next week, will need ongoing prescriptions for her anticoagulation    Pending Results: Endoscopic gastric biopsy    Condition at Discharge: Medically Stable    Reason for Hospitalization: Abdominal pain    Hospital Course by Problem List:   Principal Problem:  Portal vein thrombosis-pleasant 70-year-old lady who had recently discharged from hospital in [place] after episode of pancreatitis. She presented to us with abdominal pain. Lipase was mildly elevated. CT abdomen pelvis shows her stent is in place and pancreatic duct, she has extensive portal vein thrombosis, no obvious worsening of her pancreatitis. She was hydrated and her pain was treated. She was started on a heparin drip. By the next morning her pain is almost completely resolved. Upper endoscopy showed no evidence of portal hypertension but did have some mild antral gastritis and a biopsy was taken. She tolerated p.o. well. She was monitored overnight and her pain did not return and taking p.o. well. Hemoglobin was stable. She is discharged in improved condition on Xarelto 15 mg twice daily. She is to follow-up with her PCP on April 6 as scheduled. She will need to pursue ongoing prescriptions for the Xarelto 20 mg once daily. She was carefully advised to avoid contact sports and to avoid NSAIDs.    Active Problems:  Acute on chronic pancreatitis pancreatic duct stent in place, follow-up with GI in [place] when she gets home  Hypokalemi-mild but I did start her on some oral potassium for 30 days but hemoglobin today  Normocytic anemia-mild and probably secondary to her pancreatitis. I suspect this is anemia of chronic disease. She did show a small drop in her hemoglobin here but I think it was delusional. Follow-up with her PCP  Essential hypertension- continue metoprolol    Procedures Performed: Upper endoscopy with mild antritis found, biopsy  Pertinent Diagnostic Results:  Radiology: CT  abdomen pelvis shows extensive portal vein thrombosis    Code Status: Full Code    Allergies   Allergen Reactions     Penicillins Rash       Medication Instructions Given to the Patient at Discharge:    Your medication list     START taking these medications   Commonly known as: How do I take this medication?   acetaminophen 325 mg tablet  Quantity: 30 tablet    TYLENOL  Take 2 tablets by mouth every 6 hours as needed for Pain for Fever, Pain.    * potassium chloride 10 mEq SR tablet  Quantity: 30 tablet    KLOR-CON  Take 1 tablet by mouth daily for hypokalemia.    * potassium chloride 10 mEq SR tablet  Quantity: 30 tablet    KLOR-CON  Take 1 tablet by mouth daily for hypokalemia.    * rivaroxaban 15 mg tablet  Quantity: 28 tablet    XARELTO  Take 1 tablet by mouth 2 times daily (with meals) for portal vein thrombosis.    * rivaroxaban 20 mg tablet  Quantity: 30 tablet  Start taking on: April 17, 2020    XARELTO  Take 1 tablet by mouth daily (with dinner) for Portal vein thrombosis, start once daily after he finishes the twice daily initial dosing.      * This list has 4 medication(s) that are the same as other medications prescribed for you. Read the directions carefully, and ask your doctor or other care provider to review them with you.         CONTINUE taking these medications   Commonly known as: How do I take this medication?   diphenhydrAMINE 25 mg capsule    BENADRYL  Take 25 mg by mouth every 6 hours as needed.    METOPROLOL SUCCINATE PO    Take 25 mg by mouth nightly at bedtime.    omeprazole 20 mg capsule    PRILOSEC  Take 20 mg by mouth every morning (before breakfast).        Where to Get Your Medications     These medications were sent to [facility] [address] [place] [id number]   Hours: 7:00AM-6:30PM Mon-Fri, 8:30AM-5:00PM Sat Phone: [phone]     potassium chloride 10 mEq SR tablet    These medications were sent to [id number] - [place], [place] -  [address] AT [facility] [address], [facility] [address]   Phone: [phone]     potassium chloride 10 mEq SR tablet    rivaroxaban 15 mg tablet    These prescriptions have been printed.   Bring a paper prescription for each of these medications    rivaroxaban 20 mg tablet    Meds you have or can get without a prescription.   Ask your nurse or doctor about these medications    acetaminophen 325 mg tablet      Disposition/Plans for Post-Hospital Care/Assistance: Home    Physical Exam on Day of Discharge:    Physical Exam   Constitutional: He is oriented to person, place, and time.   Neck: Normal range of motion. No JVD present.   Cardiovascular: Normal rate, regular rhythm and normal heart sounds.   Pulmonary/Chest: Effort normal and breath sounds normal.   Abdominal: Soft. Bowel sounds are normal.   Musculoskeletal: He exhibits no edema.   Neurological: He is alert and oriented to person, place, and time.   Psychiatric: His behavior is normal.   Vitals reviewed.    Room Air or L/minute of O2 on Day of Discharge:  Room air    Instructions Provided to the Patient and Family at Discharge   Future Labs/Procedures   Activity as tolerated   Activity:   Comments:   Avoid contact sports, keep your feet on the ground while on anticoagulation   Regular diet             Josh Mckeon MD  3/28/2020    Copies of this summary should be routed to the following:  Primary Care Provider:to Dr. Johnson in [facility]  Principal Specialty Care Provider:   Referring Provider: Self      Electronically signed by Josh Mckeon MD at 03/28/2020 1:58 PM MDT      Discharge Instructions  - documented in this encounter  Appointments  Sagrario Bales RN - 03/27/2020 10:24 AM MDT    Attention patient:    You have an appointment scheduled with our PCP, Dr Anthony Johnson (Telephone visit)   [facility]   Medical clinic   [address]   Phone: [phone]    When: April  6, 2020  Time: 10:20 am    PATIENT - PLEASE CHECK PRIOR TO APPOINTMENT THAT MEDICAL RECORDS HAVE BEEN FAXED (AND RECEIVED) TO YOUR PROVIDER.    IF NOT, PLEASE CALL [facility] MEDICAL RECORDS -[phone] AND REQUEST RECORDS TO BE FAXED TO DR GUERRA'S OFFICE - FAX# [phone]       Electronically signed by Sagrario Bales RN at 03/28/2020 8:12 AM MDT         Medications at Time of Discharge  - documented as of this encounter  Medication Sig Dispensed Refills Start Date End Date   diphenhydrAMINE (BENADRYL) 25 mg capsule   Take 25 mg by mouth every 6 hours as needed.   0       METOPROLOL SUCCINATE PO   Take 25 mg by mouth nightly at bedtime.    0       omeprazole (PRILOSEC) 20 mg capsule   Take 20 mg by mouth every morning (before breakfast).   0       potassium chloride (KLOR-CON) 10 mEq SR tablet   Take 1 tablet by mouth daily for hypokalemia. 30 tablet   0 03/28/2020     rivaroxaban (XARELTO) 15 mg tablet   Take 1 tablet by mouth 2 times daily (with meals) for portal vein thrombosis. 28 tablet   0 03/28/2020     rivaroxaban (XARELTO) 20 mg tablet   Take 1 tablet by mouth daily (with dinner) for Portal vein thrombosis, start once daily after he finishes the twice daily initial dosing. 30 tablet   0 04/17/2020     acetaminophen (TYLENOL) 325 mg tablet   Take 2 tablets by mouth every 6 hours as needed for Pain for Fever, Pain. 30 tablet   0 03/28/2020 04/07/2020     Ordered Prescriptions  - documented in this encounter  Prescription Sig Dispensed Refills Start Date End Date   potassium chloride (KLOR-CON) 10 mEq SR tablet   Take 1 tablet by mouth daily for hypokalemia. 30 tablet   0 03/28/2020     rivaroxaban (XARELTO) 15 mg tablet   Take 1 tablet by mouth 2 times daily (with meals) for portal vein thrombosis. 28 tablet   0 03/28/2020     rivaroxaban (XARELTO) 20 mg tablet   Take 1 tablet by mouth daily (with dinner) for Portal vein thrombosis, start once daily after he finishes the twice daily initial dosing.  30 tablet   0 04/17/2020     potassium chloride (KLOR-CON) 10 mEq SR tablet   Take 1 tablet by mouth daily for hypokalemia. 30 tablet   0 03/28/2020 03/28/2020   rivaroxaban (XARELTO) 15 mg tablet   Take 1 tablet by mouth 2 times daily (with meals) for portal vein thrombosis. 30 tablet   0 03/28/2020 03/28/2020   acetaminophen (TYLENOL) 325 mg tablet   Take 2 tablets by mouth every 6 hours as needed for Pain for Fever, Pain. 30 tablet   0 03/28/2020 04/07/2020     Discharge Disposition  - documented in this encounter  Disposition Code Departure Means Destination   Home or Self Care           Progress Notes  - documented in this encounter  Table of Contents for Progress Notes   Vasyl Bright MD - 03/28/2020 9:55 AM Josh Krause MD - 03/27/2020 3:49 PM Vasyl Rashid MD - 03/27/2020 1:04 PM Prema Guzman RN - 03/27/2020 9:36 AM Adiel Briones MD - 03/26/2020 1:59 PM Vasyl Rashid MD - 03/28/2020 9:55 AM MDLEO  Formatting of this note might be different from the original.  Gastroenterology & Hepatology  Follow-Up Visit Note    SUBJECTIVE:  Feels much better  Minimal abdominal pain  Able to tolerate oral intake     CURRENT MEDS:    Current Facility-Administered Medications:     acetaminophen (TYLENOL) tablet 650 mg, 650 mg, Oral, Q6H PRN, 650 mg at 03/28/20 0856 **OR** acetaminophen (TYLENOL) 650 mg/20.3 mL ORAL solution 650 mg 20.3 mL, 650 mg, Oral, Q4H PRN **OR** acetaminophen (TYLENOL) suppository 650 mg, 650 mg, Rectal, Q6H PRN, Shade Pendleton MD    HYDROmorphone (DILAUDID) injection 0.2-0.8 mg, 0.2-0.8 mg, Intravenous, Q2H PRN, Shade Pendleton MD    magnesium sulfate in water IVPB premix 2 g 50 mL, 2 g, Intravenous, Q2H PRN **AND** Upon administration of magnesium sulfate place an order for Magnesium Serum (HUH775) to be drawn 2 hours after the dose is infused, , , AILYNN, Shade Pendleton MD    metoprolol succinate  "(TOPROL-XL) 24 hr tablet 25 mg, 25 mg, Oral, Daily, Shade Pendleton MD, 25 mg at 03/28/20 0832    ondansetron (ZOFRAN-ODT) disintegrating tablet 4 mg, 4 mg, Oral, Q4H PRN **OR** ondansetron (ZOFRAN) injection 4 mg, 4 mg, Intravenous, Q4H PRN, Shade Pendleton MD    pantoprazole (PROTONIX) EC tablet 40 mg, 40 mg, Oral, Daily, Shade Pendleton MD, 40 mg at 03/28/20 0832    potassium chloride (KLOR-CON) SR tablet 20 mEq, 20 mEq, Oral, PRN **OR** potassium chloride (KLOR-CON) SR tablet 40 mEq, 40 mEq, Oral, PRN, 40 mEq at 03/27/20 2107 **OR** potassium chloride (KAYCIEL) 1.33 mEq/mL ORAL liquid 20 mEq 15 mL, 20 mEq, Feeding Tube, PRN **OR** potassium chloride (KAYCIEL) 1.33 mEq/mL ORAL liquid 40 mEq 30 mL, 40 mEq, Feeding Tube, PRN, Tonia Alanis PAC    potassium chloride 10 mEq in 100 mL IVPB premix, 10 mEq, Intravenous, PRN **OR** potassium chloride 10 mEq in 100 mL IVPB premix, 10 mEq, Intravenous, PRN, Shade Pendleton MD    rivaroxaban (XARELTO) tablet 15 mg, 15 mg, Oral, BID With Meals, 15 mg at 03/28/20 0832 **FOLLOWED BY** [START ON 4/17/2020] rivaroxaban (XARELTO) tablet 20 mg, 20 mg, Oral, Daily with dinner, Josh Mckeon MD    ALLERGIES:   Allergies   Allergen Reactions     Penicillins Rash       PHYSICAL EXAM:  Blood pressure 136/81, pulse 84, temperature 37.1  C (98.8  F), temperature source Tympanic, resp. rate 18, height 1.626 m (5' 4\"), weight 66.8 kg (147 lb 4.3 oz), SpO2 96 %. Body mass index is 25.28 kg/m .  GEN: well appearing, seated in exam room speaking in full sentences  HEENT: normocephalic/atraumatic, anicteric sclera, moist mucous membranes, no oral lesions or thrush  CV: S1S2, regular rate and rhythm, no murmurs/rubs/gallops  PULM: clear to auscultation bilateraly, good inspiratry effort, no clear crackles or rales  ABD: soft, non-tender, non-distended, normal bowel sounds, no rebound or gaurding, no appreciable hepatosplenomegaly  EXT: " warm, well perfused, no joint deformities. No cyanosis, clubbing, or edema.  SKIN: no petechiae or rash, complete skin exam not performed  LYMPH: no supraclaviculat, axillary, periumbilical, or inguinal adenopathy  NEURO: AOx3, EOMI, CN2-12 groslly intact, gait steady    RECENT LAB DATA:  CBC: No results found for: WBC, HGB, HCT, PLT, MCV, RDW  Carbon Dioxide   Date/Time Value Ref Range Status   03/27/2020 02:39 AM 23 22 - 30 mmol/L Final   03/26/2020 11:06 AM 24 22 - 30 mmol/L Final     Alanine Aminotransferase   Date/Time Value Ref Range Status   03/26/2020 11:06 AM 19 <35 U/L Final     Aspartate Aminotransferase   Date/Time Value Ref Range Status   03/26/2020 11:06 AM 38 15 - 46 U/L Final     Albumin   Date/Time Value Ref Range Status   03/26/2020 11:06 AM 3.6 3.5 - 5.0 g/dL Final     COAGS: No results found for: PROTIME, INR, PTT    IMPRESSION/REPORT/PLAN:  #1 Recurrent acute pancreatitis  #2 Family history of hereditary pancreatitis  #3 Portal vein thrombus, related to #1   EGD 3/27 without esophageal varices  OK for oral anticoagulation for at lest 4 months with repeat dopplers as outpatient  Pancreatic stent likely will need removed in a few weeks locally in [facility]  Advised 5 meals per day, low fat to avoid overstimulation of pancreas  GI signing off.         Electronically signed by Vasyl Bright MD at 03/28/2020 9:57 AM MDT    Back to top of Progress Notes  Josh Mckeon MD - 03/27/2020 3:49 PM MDT  Formatting of this note might be different from the original.  [facility]  DAILY PROGRESS NOTE    Hospital Day: 0    Assessment/Plan  Principal Problem:  Portal vein thrombosis-very nice lady who presented with epigastric pain. She is traveling through from [place] to [place]. She has hereditary pancreatitis. She has a stent in her pancreas which was seen on CT. Her lipase was 460. She was noted to have extensive portal vein thrombosis. She is placed on a heparin drip and her pain  resolved. GI was consulted and felt an upper endoscopy to evaluate for cause of her abdominal pain and/or portal hypertensive changes was done showed some mild antral gastritis. Pain is now well controlled  Start Xarelto  Monitor blood count and if stable in a.m. could be discharged    Active Problems:  Acute on chronic pancreatitis-she already has made arrangements to be followed up in [place] to have her pancreatic duct stent changed    Hypokalemia-replaced and resolved  Normocytic anemia-hemoglobin was 12 on admission and is dropped to 10.4. I think is likely delusional as there is no evidence of bleeding. Recheck in a.m.  Essential hypertension-easily controlled on her usual metoprolol    DVT Prophylaxis: Indicated: Heparin Gtt    Subjective  Pain resolved. She denies that she ever had any melena or bloody stools prior to admission    Events/Procedures last 24hrs CT abdomen and pelvis shows portal vein thrombosis quite extensive, EGD today shows some mild gastritis    Temp: [36.9  C (98.4  F)-37.9  C (100.2  F)] 37.3  C (99.1  F)  Heart Rate: [78-91] 83  Respirations: [15-21] 20  BP: (122-155)/(70-92) 132/76    Physical Exam  Pleasant lady in no distress. Abdominal pain is resolved. She is on regular diet. Chest is clear and cardiovascular regular. Abdomen is unremarkable   current Medications  Continuous Infusions:  Scheduled Meds:    metoprolol succinate 25 mg Oral Daily     pantoprazole 40 mg Oral Daily     rivaroxaban 15 mg Oral BID With Meals   Followed by     [START ON 4/17/2020] rivaroxaban 20 mg Oral Daily with dinner     PRN Meds:  acetaminophen **OR** acetaminophen **OR** acetaminophen, HYDROmorphone, magnesium sulfate **AND** Upon administration of magnesium sulfate place an order for Magnesium Serum (UWN760) to be drawn 2 hours after the dose is infused, ondansetron **OR** ondansetron, potassium chloride in water **OR** potassium chloride in water     Results/Verification of Data Review  Recent  "Results (from the past 24 hour(s))   Unfractionated Heparin Level (Anti-Xa)   Collection Time: 03/26/20 7:57 PM   Result Value Ref Range   Heparin Level (anti-Xa activity) 0.53 0.30 - 0.70 U/mL   CBC No Auto Diff   Collection Time: 03/27/20 2:39 AM   Result Value Ref Range   White Blood Cell Count 4.0 4.0 - 11.0 10*9/L   Red Blood Cell Count 3.53 (L) 4.18 - 5.64 10*12/L   Hemoglobin 10.4 (L) 12.1 - 16.3 g/dL   Hematocrit 32.3 (L) 35.7 - 46.7 %   Mean Corpuscular Volume 91.5 80.0 - 100.0 fL   Mean Corpuscular Hemoglobin 29.5 27.5 - 35.1 pg   Mean Corpuscular Hemoglobin Concentration 32.2 32.0 - 36.0 g/dL   Platelet Count 276 150 - 400 10*9/L   Red Cell Distribution Width CV 13.9 11.7 - 14.2 %   NRBC Percent 0.0 0 %   NRBC Absolute 0.00 0 10*9/L   Basic metabolic panel   Collection Time: 03/27/20 2:39 AM   Result Value Ref Range   Sodium Serum/Plasma 140 137 - 145 mmol/L   Potassium Serum/Plasma 3.4 (L) 3.5 - 5.5 mmol/L   Chloride Serum/Plasma 109 98 - 109 mmol/L   Carbon Dioxide 23 22 - 30 mmol/L   Glucose Serum/Plasma 94 74 - 100 mg/dL   Blood Urea Nitrogen 8 7 - 20 mg/dL   Creatinine Serum/Plasma 0.40 (L) 0.52 - 1.04 mg/dL   eGFR if Non-African American >60 >=60 mL/min/1.73 \"square meters\"   eGFR if African American >60 >=60 mL/min/1.73 \"square meters\"   Calcium Serum/Plasma 8.7 8.4 - 10.2 mg/dL   Anion Gap 8 4 - 15 mmol/L   Unfractionated Heparin Level (Anti-Xa)   Collection Time: 03/27/20 2:39 AM   Result Value Ref Range   Heparin Level (anti-Xa activity) 0.41 0.30 - 0.70 U/mL     Time/Communication  N/A    Josh Mckeon MD  3/27/2020      Electronically signed by Josh Mckeon MD at 03/27/2020 3:53 PM MDT    Back to top of Progress Notes  Vasyl Bright MD - 03/27/2020 1:04 PM MDT  EGD was performed    No esophageal varices found  Mild antral gastropathy  Pancreatic stent noted in the duodenum    Recommendations  -Follow-up with local GI physician  -Will likely need pancreatic stent removal in a few " weeks to be done as outpatient  -Limit Toradol given age. Trial of Ultram should be considered  -OK to transition to oral anticoagulation. Plan for anticoagulation for total of 4-6 months to be determined with repeat Doppler studies of the portal system  -Given improving symptoms, advance diet as tolerated.     Electronically signed by Vasyl Bright MD at 03/27/2020 1:06 PM MDT    Back to top of Progress Notes  Prema Fowler, RN - 03/27/2020 9:36 AM MDT   DIRECT PATIENT EVALUATION    Amanda Sandoval is a 70 y.o. female admitted for worsening mid-epigastric abdominal pain     ONGOING CARE  On-Going Care: PCP follow-up(Telephone post hospital visit with PCP - Dr. Anthony Johnson 4/6/2020 /[phone] )    BARRIERS/STRENGTHS  B-Medical clearance  S-Patient lives in [place], [place] with spouse (Zohaib Sandoval [phone])  Current PCP - Dr Anthony Johnson [facility] [phone]    INTERVENTIONS  Call to patient for direct evaluation.  Discussed CM role and discharge planning, verified demographics.   Patient lives in a private residence with spouse in [place], [place]. Was traveling from [place] to home when became ill and admitted for worsening abdominal pain.  Patient was previously hospitalized in [place]. Patient has scheduled a follow up (phone visit) with PCP on 4/6/2020 and is requesting BENNIE to be available for PCP during her appointment.  BENNIE given to bedside nurse for patient's signature.   Signature obtained, request for BENNIE faxed to  medical records [phone].  MR to fax records to Dr. Anthony Johnson's office at [phone] after discharge.    ADLs - independent, no DME  PCP and insurance identified.   MDPOA - stated daughter (Myra) - documents in [place] and declines filling out any additional MDPOA during this admission.  GI consult, EGD today      CLINICAL IMPRESSION  Patient actively participates in phone call.  Knowledgeable about personal health, verbalizes understanding of admission and  POC.    Living Situation Assessment  Patient currently lives: W/ spouse/significant other  Support Systems: Spouse/significant other, Children(Daughter - MDPOA [phone] (stated))    Would patient like to designate a person to provide post-discharge aftercare?: No    Current DME: Other (Comment)(None)    CM to continue to follow medical course for any discharge needs as the arise.    Readmission:          LACE+ Readmission Risk Action Plan    MRCP 3/12/2020  IMPRESSION:  1.  Diffuse circumferential wall enhancement of the mildly dilated intra   and extra hepatic biliary ductal system raises concern for possible   ascending cholangitis.  2.  Trace amount of T2 hyperintense fluid seen surrounding the pancreatic   tail may reflect mild acute pancreatitis.  3.  Unchanged mild intra and extra hepatic biliary duct dilatation as well   as dilatation of the main pancreatic duct.  No discrete obstructing mass   or stone is seen.  Consider further evaluation with ERCP/EUS to exclude   the possibility of occult   obstructing stricture, stone, or lesion.  4.  A stable 13 mm cystic lesion is seen within the pancreatic neck which   was not discretely visualized on CT from 02/20/2020 and likely represents   a small peripancreatic collection related to recent pancreatitis versus a   side branch IPMN.  Recommend   attention on follow-up imaging.  5.  Geographic areas of hepatic steatosis.  Result Impression   IMPRESSION:  1.  Portal vein thrombosis extending from distal portions of the splenic vein and SMV throughout the entire portal vein and involving all of the intrahepatic portal veins.  2.   Appropriately positioned pancreatic duct stent with findings consistent with interstitial pancreatitis.   3.  No other significant findings.        IMPRESSION: 2/23/2020  Gallbladder contains sludge with minimal wall thickening.  No pericholecystic fluid was seen.  No convincing sonographic evidence of acute cholecystitis.    There is  mild dilation of the extrahepatic biliary system up to 8.2 mm.  If there is concern for choledocholithiasis, MRI/MRCP may be helpful.    Mild prominence of the main pancreatic duct measuring up to 4 mm.  Fluid collection in the region of the pancreatic body measuring 2.0 x 1.5 x 2.5 cm.  Findings are suggestive of an acute peripancreatic fluid collection in the setting of pancreatitis as   demonstrated on recent CT.  IMPRESSION:   1.  Stent in normal caliber main pancreatic duct. Pancreatic and peripancreatic  edema most likely representing acute on chronic pancreatitis. No discrete  pancreatic mass. No bile duct dilatation. No abscess or pseudocyst. No previous  available for comparison. Recommend follow-up.  2.  Extensive portal vein thrombosis with collateral formation.    Video-Visit Details    Type of service:  Video Visit    Video End Time (time video stopped): 16:27    Originating Location (pt. Location): Home    Distant Location (provider location):  [facility] PANCREAS [facility] B[facility]     Mode of Communication:  Video Conference via Hyperpia      .mlf  Nate Morton MD  Gastroenterology, Pancreas and Biliary Disorders  [facility]

## 2020-04-08 NOTE — NURSING NOTE
Chief Complaint   Patient presents with     Consult     consult for panc       There were no vitals filed for this visit.    There is no height or weight on file to calculate BMI.      ADRIANNE Good NREMT

## 2020-04-10 PROBLEM — K85.00 IDIOPATHIC ACUTE PANCREATITIS: Status: ACTIVE | Noted: 2020-01-01

## 2020-04-10 NOTE — PROGRESS NOTES
Per Dr Ruiz  Yes.   Important to get at least the CT from [place] for review, and if possible the CT and MRCP from [place].     CT @ [facility] on . Need to fax to [phone]. Faxed. [place] imaging already requested.     Please assist in scheduling:     Procedure/Imaging/Clinic: EUS/ERCP  Physician: Joseph/Praveen  Timin2020  Procedure length:150 minutes  Anesthesia:general  Dx: pancreatitis  Location: [facility]      Called to discuss with patient. Explained they can expect a call for date and time for procedure, will need a , someone to stay with them for 24 hours and should stay in town for 24 hours (within 45 min of Hospital) post procedure    Preop- see CareEverywhere note from Dr Johnson from 4-, Dr. Morton will update as needed DOS. Also has admit . See CareEverywhere for EGG done 3/7/2020 @ [facility].    Blood thinner -  On xerelto, will hold 2 days prior  ASA - no  Diabetic - no      A pre-op nurse will call 1-2 days prior to the procedure. Is advised to be NPO/no solid food 8 hours before the procedure. Ok to drink clear liquids (Water, Apple Juice or Gatorade) up to 2 hours prior to procedure.     Other specific details/comments:no, patient aware of plan     Verbalized understanding of all instructions. All questions answered.

## 2020-04-13 NOTE — TELEPHONE ENCOUNTER
Spoke to patient in regards to scheduled procedure. Informed patient she is scheduled with Dr. Morton and Dr. Ruiz on 4/23/2020. Informed patient she will need a  and someone to monitor her for 24 hours after the procedure. Informed patient all scheduling details will be sent to her MyChart per her request.

## 2020-04-15 NOTE — TELEPHONE ENCOUNTER
Action 05/04/20 10:59 AM - Sammi   Action Taken  Records Disc received from [facility] and 158 pages sent to [facility] to be scanned into the chart.     Action 04/16/20 1:54 PM  - Sammi   Action Taken  Imaging disc received from [facility] and sent to [facility] to be uploaded into PACs.    3/26/20 - CT Abdomen/Pelvis    Note: Cover sheet stated has GI Exam at [facility] who you can contact at [phone].       Action 04/15/20 1:12 PM - Sammi   Action Taken  Imaging discs received from [facility] and sent to [facility] to be uploaded into PACs.    3/18/20 - [place] ERCP Pancreatic Duct Only  3/12/20 - MRI Abdomen W/WO  3/7/20 - US Abdomen Complete  3/7/20 - MRI Abdomen W/WO MRCP  2/23/20 - US Abdomen Complete  2/20/20 - CT Abdomen/Pelvis W  2/20/20 - XR Chest AP Only

## 2020-04-21 NOTE — PATIENT INSTRUCTIONS
If you were tested, we will call you with your COVID-19 result. You don't need to call us to check on your result. You can also use the information at the end of this document to sign up for [facility] [employer] where you can get your results and a message about those results sent to you through the FilterEasy application.    Regardless of if you have been tested or not:  Patient who have symptoms (cough, fever, or shortness of breath), need to isolate for 7 days from when symptoms started AND 72 hours after fever resolves (without fever reducing medications) AND improvement of respiratory symptoms (whichever is longer).      Isolate yourself at home (in own room/own bathroom if possible)    Do Not allow any visitors    Do Not go to work or school    Do Not go to Mosque,  centers, shopping, or other public places.    Do Not shake hands.    Avoid close and intimate contact with others (hugging, kissing).    Follow CDC recommendations for household cleaning of frequently touched services.     After the initial 7 days, continue to isolate yourself from household members as much as possible. To continue decrease the risk of community spread and exposure, you and any members of your household should limit activities in public for 14 days after starting home isolation.     You can reference the following CDC link for helpful home isolation/care tips:  https://www.cdc.gov/coronavirus/2019-ncov/downloads/10Things.pdf    Protect Others:    Cover Your Mouth and Nose with a mask, disposable tissue or wash cloth to avoid spreading germs to others.    Wash your hands and face frequently with soap and water    Call Back If: Breathing difficulty develops or you become worse.    For more information about COVID19 and options for caring for yourself at home, please visit the CDC website at https://www.cdc.gov/coronavirus/2019-ncov/about/steps-when-sick.html  For more options for care at [facility], please visit  our website at [email or web address]    For more information, please use the [place] Department of Health COVID-19 Website: https://www.health.[facility].[place].us/diseases/coronavirus/index.html  [place] Department of Health ([facility]) COVID-19 Hotlines (Interpreters available):      Health questions: Phone Number: [phone] or [phone] and Hours: 7 a.m. to 7 p.m.    Schools and  questions: Phone Number: [phone] or [phone] and Hours 7 a.m. to 7 p.m.

## 2020-04-23 NOTE — BRIEF OP NOTE
[facility], [place]    Brief Operative Note    Pre-operative diagnosis: Idiopathic acute pancreatitis [K85.00]  Post-operative diagnosis Same as pre-operative diagnosis    Procedure: Procedure(s):  Diagnostic ENDOSCOPIC ULTRASOUND, ESOPHAGOSCOPY / UPPER GASTROINTESTINAL TRACT (GI) with Stent Removal  ENDOSCOPIC RETROGRADE CHOLANGIOPANCREATOGRAPHY with Stent Exchange, Pancreatic Stent Placement, Biliary Duct Stent Placement, Pancreatic and Biliary Sphincterotomy, Pancreatic Sludge Removal  Surgeon: Surgeon(s) and Role:  Panel 1:     * Braden Ruiz MD - Primary  Panel 2:     * Nate Morton MD - Primary     * Emmanuel Johnson MD - Fellow - Assisting  Anesthesia: General   Estimated blood loss: Minimal  Drains: None  Specimens: * No specimens in log *    Complications: None.  Implants:   Implant Name Type Inv. Item Serial No.  Lot No. LRB No. Used Action   Pancreatic Duct Stent      N/A 1 Explanted   STENT MORTON PANCREA FLEX [id number] W/O FLANGE SGL PIGTAIL Stent STENT MORTON PANCREA FLEX [id number] W/O FLANGE SGL PIGTAIL  [place] X22- N/A 1 Implanted   STENT MORTON PANCREA FLEX [id number] W/O FLANGE SGL PIGTAIL Stent STENT MORTON PANCREA FLEX [id number] W/O FLANGE SGL PIGTAIL  [place] S07- N/A 1 Wasted   STENT MORTON PANCREA FLEX [id number] W/O FLANGE SGL PIGTAIL Stent STENT MORTON PANCREA FLEX [id number] W/O FLANGE SGL PIGTAIL  [place] C78- N/A 1 Implanted   STENT MORTON PANCREA FLEX [id number] Stent STENT MORTON PANCREA FLEX [id number]  [place] A25- N/A 1 Implanted         Findings:         EUS:  - Irregular dorsal duct with a stenosis and downstream dilation which is not consistent with a malignant stricture. The ventral was followed from major papilla, but could not be followed to the dorsal duct.  - Features of chronic pancreatitis as outlined above. These included one Major A and B and three Minor criteria, which are consistent with  chronic pancreatitis by [place] Criteria.  - Slightly dilated common bile duct given her age without choledocholithiasis.  - Adenomyomatosis.  - Possible gallbladder stone versus polyp.      ERCP:  - Patent prior minor papillotomy was extended  - The dorsal duct was irregular and dilated. It was swept and sludge was removed.  - Two 4Fr (11cm and 3cm) Morton stents were placed in the dorsal duct.  - The major papilla was normal.  - The ventral duct was cannulated, which demonstrated an ansa loop. It was stented with a 4Fr by 2cm Morton stent.  - Selective biliary cannulation was performed and the duct was stented with a 4Fr by 7cm Morton stent.      Recommendations:  -Monitor patient in PACU as per protocol.  -Amylase/Lipase at two hours  -Hold Anticoagulation for 72 hours.  -Ice chips for now.  -Plan to discharge at 4 hours post procedure.        Emmanuel Johnson MD  Interventional Endoscopy Fellow

## 2020-04-23 NOTE — PROGRESS NOTES
The following page sent to Dr. Morton: MERVAT Sandoval-Phase 2: Amylase 67, lipase 269. do you want do see pt before d/c? gustavo [id number]    1430: Dr. Morton called to speak with pt via phone. MD verifies ok to discharge.

## 2020-04-23 NOTE — DISCHARGE INSTRUCTIONS
[facility]   Adult Discharge Orders & Instructions     For 24 hours after surgery    1. Get plenty of rest.  A responsible adult must stay with you for at least 24 hours after you leave the hospital.   2. Do not drive or use heavy equipment.  If you have weakness or tingling, don't drive or use heavy equipment until this feeling goes away.  3. Do not drink alcohol.  4. Avoid strenuous or risky activities.  Ask for help when climbing stairs.   5. You may feel lightheaded.  IF so, sit for a few minutes before standing.  Have someone help you get up.   6. If you have nausea (feel sick to your stomach): Drink only clear liquids such as apple juice, ginger ale, broth or 7-Up.  Rest may also help.  Be sure to drink enough fluids.  Move to a regular diet as you feel able.  7. You may have a slight fever. Call the doctor if your fever is over 100 F (37.7 C) (taken under the tongue) or lasts longer than 24 hours.  8. You may have a dry mouth, a sore throat, muscle aches or trouble sleeping.  These should go away after 24 hours.  9. Do not make important or legal decisions.   Call your doctor for any of the followin.  Signs of infection (fever, growing tenderness at the surgery site, a large amount of drainage or bleeding, severe pain, foul-smelling drainage, redness, swelling).    2. It has been over 8 to 10 hours since surgery and you are still not able to urinate (pass water).    3.  Headache for over 24 hours.    4.  Numbness, tingling or weakness the day after surgery (if you had spinal anesthesia).  To contact a doctor, call _Joseph [phone]______________________________________

## 2020-04-23 NOTE — ANESTHESIA CARE TRANSFER NOTE
Patient: Amanda Sandoval    Procedure(s):  Diagnostic ENDOSCOPIC ULTRASOUND, ESOPHAGOSCOPY / UPPER GASTROINTESTINAL TRACT (GI) with Stent Removal  ENDOSCOPIC RETROGRADE CHOLANGIOPANCREATOGRAPHY with Stent Exchange, Pancreatic Stent Placement, Biliary Duct Stent Placement, Pancreatic and Biliary Sphincterotomy, Pancreatic Sludge Removal    Diagnosis: Idiopathic acute pancreatitis [K85.00]  Diagnosis Additional Information: No value filed.    Anesthesia Type:   General     Note:    Patient transferred to:PACU  Comments: Pt remains stable, monitors on alarms in place, report to PACU RN, no complicationsHandoff Report: Identifed the Patient, Identified the Reponsible Provider, Reviewed the pertinent medical history, Discussed the surgical course, Reviewed Intra-OP anesthesia mangement and issues during anesthesia, Set expectations for post-procedure period and Allowed opportunity for questions and acknowledgement of understanding      Vitals: (Last set prior to Anesthesia Care Transfer)    CRNA VITALS  4/23/2020 1103 - 4/23/2020 1133      4/23/2020             EKG:  NSR                Electronically Signed By: ANABELL Durand CRNA  April 23, 2020  11:33 AM

## 2020-04-23 NOTE — ANESTHESIA POSTPROCEDURE EVALUATION
Anesthesia POST Procedure Evaluation    Patient: Amanda Sandoval   MRN:     [id number] Gender:   female   Age:    70 year old :      1950        Preoperative Diagnosis: Idiopathic acute pancreatitis [K85.00]   Procedure(s):  Diagnostic ENDOSCOPIC ULTRASOUND, ESOPHAGOSCOPY / UPPER GASTROINTESTINAL TRACT (GI) with Stent Removal  ENDOSCOPIC RETROGRADE CHOLANGIOPANCREATOGRAPHY with Stent Exchange, Pancreatic Stent Placement, Biliary Duct Stent Placement, Pancreatic and Biliary Sphincterotomy, Pancreatic Sludge Removal   Postop Comments: No value filed.     Anesthesia Type: General       Disposition: Outpatient   Postop Pain Control: Uneventful            Sign Out: Well controlled pain   PONV: No   Neuro/Psych: Uneventful            Sign Out: Acceptable/Baseline neuro status   Airway/Respiratory: Uneventful            Sign Out: Acceptable/Baseline resp. status   CV/Hemodynamics: Uneventful            Sign Out: Acceptable CV status   Other NRE: NONE   DID A NON-ROUTINE EVENT OCCUR? No         Last Anesthesia Record Vitals:  CRNA VITALS  2020 1105 - 2020 1205      2020             EKG:  NSR          Last PACU Vitals:  Vitals Value Taken Time   /71 2020 12:20 PM   Temp 36.3  C (97.3  F) 2020 12:15 PM   Pulse 68 2020 12:20 PM   Resp 14 2020 12:15 PM   SpO2 98 % 2020 12:22 PM   Temp src     NIBP     Pulse     SpO2     Resp     Temp     Ht Rate     Temp 2     Vitals shown include unvalidated device data.      Electronically Signed By: Jocelyn Fine MD, 2020, 12:23 PM

## 2020-04-23 NOTE — ADDENDUM NOTE
Addendum  created 04/23/20 1312 by Christiana Zabala APRN CRNA    Intraprocedure Meds edited, Orders acknowledged in Narrator

## 2020-04-23 NOTE — ANESTHESIA PREPROCEDURE EVALUATION
"Anesthesia Pre-Procedure Evaluation    Patient: Amanda Sandoval   MRN:     [id number] Gender:   female   Age:    70 year old :      1950        Preoperative Diagnosis: Idiopathic acute pancreatitis [K85.00]   Procedure(s):  ENDOSCOPIC ULTRASOUND, ESOPHAGOSCOPY / UPPER GASTROINTESTINAL TRACT (GI)  ENDOSCOPIC RETROGRADE CHOLANGIOPANCREATOGRAPHY     LABS:  CBC:   Lab Results   Component Value Date    WBC 3.3 (L) 2020    HGB 11.3 (L) 2020    HCT 37.7 2020     (L) 2020     BMP:   Lab Results   Component Value Date     2020    POTASSIUM 3.4 2020    CHLORIDE 114 (H) 2020    CO2 21 2020    BUN 18 2020    CR 0.61 2020    GLC 91 2020     COAGS: No results found for: PTT, INR, FIBR  POC:   Lab Results   Component Value Date     (H) 2020     OTHER:   Lab Results   Component Value Date    PASCUAL 9.5 2020    ALBUMIN PENDING 2020    PROTTOTAL PENDING 2020    ALT PENDING 2020    AST PENDING 2020    ALKPHOS PENDING 2020    BILITOTAL PENDING 2020        Preop Vitals    BP Readings from Last 3 Encounters:   20 (!) 144/71    Pulse Readings from Last 3 Encounters:   20 63      Resp Readings from Last 3 Encounters:   20 16    SpO2 Readings from Last 3 Encounters:   20 100%      Temp Readings from Last 1 Encounters:   20 36.6  C (97.8  F) (Oral)    Ht Readings from Last 1 Encounters:   20 1.626 m (5' 4\")      Wt Readings from Last 1 Encounters:   20 68.1 kg (150 lb 2.1 oz)    Estimated body mass index is 25.77 kg/m  as calculated from the following:    Height as of this encounter: 1.626 m (5' 4\").    Weight as of this encounter: 68.1 kg (150 lb 2.1 oz).     LDA:        History reviewed. No pertinent past medical history.   History reviewed. No pertinent surgical history.   Allergies   Allergen Reactions     Penicillins Rash     Erythromycin Diarrhea, GI " Disturbance and Nausea     GI upset.       Oxycodone Other (See Comments)     Other reaction(s): Constipation  severe constipation  severe constipation  Constipation       Nickel Rash        Anesthesia Evaluation     .             ROS/MED HX    ENT/Pulmonary:  - neg pulmonary ROS     Neurologic:  - neg neurologic ROS     Cardiovascular:     (+) hypertension----. : . . . :. .       METS/Exercise Tolerance:  >4 METS   Hematologic:  - neg hematologic  ROS       Musculoskeletal:  - neg musculoskeletal ROS       GI/Hepatic: Comment: Idiopathic pancreatitis        Renal/Genitourinary:  - ROS Renal section negative       Endo:  - neg endo ROS       Psychiatric:         Infectious Disease:  - neg infectious disease ROS       Malignancy:         Other:                         PHYSICAL EXAM:   Mental Status/Neuro:    Airway: Facies: Feasible  Mallampati: I  Mouth/Opening: Full  Neck ROM: Full   Respiratory: Auscultation: CTAB     Resp. Rate: Normal     Resp. Effort: Normal      CV: Rhythm: Regular  Rate: Age appropriate  Heart: Normal Sounds  Edema: None   Comments:                      Assessment:   ASA SCORE: 2    H&P: History and physical reviewed and following examination; no interval change.   Smoking Status:  Non-Smoker/Unknown   NPO Status: NPO Appropriate     Plan:   Anes. Type:  General   Pre-Medication: None   Induction:  IV (Standard)   Airway: ETT; Oral   Access/Monitoring: PIV   Maintenance: Balanced     Postop Plan:   Postop Pain: Opioids  Postop Sedation/Airway: Not planned     PONV Management:   Adult Risk Factors: Female, Non-Smoker, Postop Opioids   Prevention: Ondansetron     CONSENT: Direct conversation   Plan and risks discussed with: Patient                      Jocelyn Fine MD

## 2020-04-28 NOTE — TELEPHONE ENCOUNTER
Returned patients call regarding symptoms, noting post procedure note states:    Confirm spontaneous stent passage by performing a KUB  x-ray in 6 weeks.     - Follow up with stent removal as needed, and observe     for future episodes of pancreatitis - if they occur,  will consider laparoscopic cholecystectomy but not    clearly indicated at present as EUS showed no gallbladder pathology at today's exam.     Orders placed for KUB, stent book noted.     Patient states she's still having pain. Pain there all the time, pressure under stenrum where its always been. Last night worse, 5-6/10, taking Tylenol, tramadol, not helping much. No nausea/vomiting. No fever. Drinking broth/water, sports drink. Feels like she's hydrated.     Message sent to Dr. Morton, will follow    Maite Corona RN Care Coordinator

## 2020-04-28 NOTE — TELEPHONE ENCOUNTER
[facility] Call Center    Phone Message    May a detailed message be left on voicemail: yes     Reason for Call: Symptoms or Concerns     If patient has red-flag symptoms, warm transfer to triage line    Current symptom or concern: Still experiencing abdominal pain, post EUS    Symptoms have been present for:  5 day(s)    Has patient previously been seen for this? Yes    By : Joseph    Date: 4/23/2020    Are there any new or worsening symptoms? No      Action Taken: Message routed to:  [facility]): Panc    Travel Screening: Not Applicable

## 2020-04-29 NOTE — TELEPHONE ENCOUNTER
Per Dr. Morton  If pain not better tomorrow would have her come in to nearest [facility] for Abd xray multiple views to check on stent position, check lipase amanuel     Called to check on patient. Slept ok, says she feel about the same.     Labs and xrays ordered. PCP added to chart  PCP added to care teams. Orders faxed. Pt alerted to make appt and f/up.    Maite Corona RN Care Coordinator

## 2020-05-08 NOTE — PROGRESS NOTES
"Patient called to f/up on labs/imaging. Labs WNL. XR done 4/30, requested push from [facility]    Still having discomfort, having some \"tenderness\", but doing ok. Advised PCP should order anticoagulation.    Maite Corona RN Care Coordinator    "

## 2020-05-12 NOTE — PROGRESS NOTES
Per Dr. Morton    As exptected all stents have passed EXCEPT for tiny 4F 2cm flanged Morton stent which will not pass. Schedule EGD w me in unit [facility] to remove stent (GI nurse stevie) and I can talk to her there. She was the one that [place] [facility] docs did minor papillotomy and placed large stent.     Is feeling ok, said we would be in discussions about timing for procedure in [facility] for stent removal.    Miate Corona, RN Care Coordinator

## 2020-05-29 NOTE — TELEPHONE ENCOUNTER
Patient scheduled for EGD    Indication for procedure. Encounter for removal of biliary stent    Referring Provider. Clinic, [facility]    ? No     Arrival time verified? 8:45 AM    Facility location verified? [address]    Instructions given regarding prep and procedure  Instructions reviewed    Prep Type NPO after 10 PM    Are you taking any anticoagulants or blood thinners? Xeralto    Instructions given? Advised patient to contact her provider about stopping 2 days prior to exam    Electronic implanted devices? Denies     Pre procedure teaching completed? Yes    Transportation from procedure?  policy reviewed. Instructed patient to have someone stay with her for 6 hours post exam    H&P / Pre op physical completed? N/a

## 2020-05-29 NOTE — TELEPHONE ENCOUNTER
Called Amanda to discuss stent removal in [facility]. Orders placed for removal and orders placed for xray prior.     Scheduled xray for 6/5 cancelled per patient request.     Message sent to scheduling.    Maite Corona RN Care Coordinator

## 2020-06-03 NOTE — TELEPHONE ENCOUNTER
Telephone COVID Screening Assessment  Procedure: EGD  Date: Thurs, 6/4/2020  Check-in Time:   0830: X-ray  0900: Endoscopy  Provider: Dr. Morton  Location:   [place] [address]   1st Floor,  [phone]   _____________________________________  [x] Procedure-related queries answered to patient's expressed satisfaction.  Additional Notes: Xarelto continued per primary  _________________________________________    [x] Confirmed COVID test done with [facility] provider, results incomplete - Date test done: 6/2/2020 . Inbox message to procedure provider et Endoscopy Supervisor regarding incomplete result.      Tesha Wilson, RN  [facility]

## 2020-06-04 NOTE — DISCHARGE INSTRUCTIONS
Discharge Instructions   Upper Endoscopy (EGD) with stent removal    Activity and Diet  You were given medicine for pain. You may be dizzy or sleepy.  For 24 hours:    Do not drive or use heavy equipment.    Do not make important decisions.    Do not drink any alcohol.  ___ You may return to your regular diet.    Discomfort  You may have a sore throat for 2 to 3 days. It may help to:    Avoid hot liquids for 24 hours.    Use sore throat lozenges.    Gargle as needed with salt water up to 4 times a day. Mix 1 cup of warm water  with 1 teaspoon of salt. Do not swallow.    You may take Tylenol (acetaminophen) for pain unless your doctor has told you not to.  You may also take aspirin or ibuprofen (Advil, Motrin) or other NSAIDS  (anti-inflammatory drugs).      Other instructions________________________________________________________    When to call us:  Problems are rare. Call right away if you have:    Unusual throat pain or trouble swallowing    Unusual pain in belly or chest that is not relieved by belching or passing air    Black stools (tar-like looking bowel movement)    Temperature above 100.6  F. (37.5  C).    If you vomit blood or have severe pain, go to an emergency room.    If you have questions, call:  Monday to Friday, 7 a.m. to 4:30 p.m.: Endoscopy: [phone] (We may have to call you back)    After hours: Hospital: [phone] (Ask for the GI fellow on call)

## 2020-06-08 NOTE — TELEPHONE ENCOUNTER
Post EGD (6/4/2020) with Dr. Morton: Follow-up    Post procedure recommendations:   Plan virtual clinic visit in one year, sooner if                        recurrent pancreatitis                        - Would ask primary Dr Johnson or Dr William Steinberg to                        repeat RUQ ultrasound - and if biliary sludge identified                        consider laparoscopic cholecystectomy                        - As stated in ERCP roport, eitology of this patients                        late age first episode of pancreatitis is as likely                        biliary as it was releated to PRSS1, and pt DOES NOT                        have divisum as thought in [place].     Orders placed: procedure note routed to Dr Steinberg    Patient states:  Feeling better- no concerning symptoms.     Reports routed to:  [facility] Oosrio Herbert MD   [facility] [place]   [address] [address]    Clinic contact and scheduling numbers verified for future questions/concerns.      Maite Corona RN Care Coordinator

## 2020-06-21 PROBLEM — K85.00 IDIOPATHIC ACUTE PANCREATITIS, UNSPECIFIED COMPLICATION STATUS: Status: ACTIVE | Noted: 2020-01-01

## 2020-06-21 NOTE — CONSULTS
Panc Bili Consultation  Amanda Sandoval [id number] 70 year old 1950 6/21/2020        Date of Admission: 6/21/2020  Reason for consult: Pancreatitis   Requesting physician: Dr. 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 PMHx also includes HTN.     #. Acute recurrent pancreatitis 2/2 PRSS1 gene positive  #. Portal vein + proximal splenic thrombosis, w/cavernous transformation and collaterals   Patient developed initial episode in February 2020 with several recurrent episodes of pancreatitis since and development of PVT requiring anticoagulation. She has changes c/f chronic pancreatitis in her dorsal duct. She underwent a partial minor papillotomy and stent placement in 3/2020; this stent was subsequently removed in 4/2020 - at that time, the minor papillotomy was extended and a biliary sphincterotomy was performed. Her stents are currently out and she has developed recurrent pancreatitis.     BISAP score: 1 (given age)  SIRS: 0         Recommendations:   -- Plan for ERCP on 6/22, will consider double stenting of her dorsal duct and ensure bile duct is clear  -- NPO at [place]  -- Recommend LR while NPO  -- Hold anticoagulation pending ERCP  -- Anti-emetics, analgesics & electrolyte repletion per primary team    It has been a pleasure to participate in the care of this patient.  Patient discussed with staff, Dr. Ruiz.  Please do not hesitate to contact the [facility] Bili service with any questions or concerns.     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 PMHx also includes HTN.     Timeline:  - Diagnosed with pancreatitis in  2/2020, in the setting of an extensively family history of hereditary pancreatitis. No heavy alcohol use, last use 1/2020.   - Admitted 3/2020 in [place], an EUS at that time noted GB sludge, mildly dilated pancreatic duct. Underwent a minor papillotomy precut and pancreatic stent placement in the dorsal PD (7 Fr x 12 cm).   - 3/2020: Diagnosed w/PVT, started on anticoagulation   - 4/25/20: EUS notable for irregular dorsal duct w/stensosis and downstream dilation. Small debris in main PD. Slightly dilated CBD. ERCP notable for stenotic prior minor papillotomy w/mild ulceration, dilated and irregular dorsal duct from ?stent induced injury. Dorsal duct communicating freely w/ansa loop ventral duct [no divisum!]. Minor papillotomy was extended at that time and two stents were placed. Stent placed in major papillary and biliary sphincterotomy was performed w/stent placement.  - Admitted from 6/15-6/16 to [facility]: presented w/epigastric pain that didn't improve w/tramadol. CT scan was done (as below) notable for acute pancreatitis, PD dilation and chronic PV and proximal splenic vein thrombosis w/collaterals. Discharged w/percocet   - Admitted from 6/18-6/21 to [facility]: presented w/epgiastric pain that didn't improve w/percoccet. She tolerated CLD, but didn't tolerate diet advancement. Given ongoing abdominal pain, nausea and emesis, she was transferred to [facility].     Currently:   - Denies any fevers or chills  - She has ongoing sharp abdominal pain, starts in the epigastric area, radiates to left flank. Right now it is at a 4/10, improved with PRN pain medications  - She has been having nausea and emesis on and off for the past few days with attempted diet advancement, she has been tolerating a CLD but didn't do well with a full liquid diet  - Last BM was around 6/17, passing flatus. No melena or hematochezia PTA.          Past Medical History:     Past Medical History:   Diagnosis Date     History  of ERCP     last stent placement via EUS/ERCP April 2020     HTN (hypertension)      Pancreatitis, chronic (H)      Portal vein thrombosis           Past Surgical History:     Past Surgical History:   Procedure Laterality Date     ENDOSCOPIC RETROGRADE CHOLANGIOPANCREATOGRAM N/A 4/23/2020    Procedure: ENDOSCOPIC RETROGRADE CHOLANGIOPANCREATOGRAPHY with Stent Exchange, Pancreatic Stent Placement, Biliary Duct Stent Placement, Pancreatic and Biliary Sphincterotomy, Pancreatic Sludge Removal;  Surgeon: Nate Morton MD;  Location: [facility] OR     ENDOSCOPIC ULTRASOUND UPPER GASTROINTESTINAL TRACT (GI) N/A 4/23/2020    Procedure: Diagnostic ENDOSCOPIC ULTRASOUND, ESOPHAGOSCOPY / UPPER GASTROINTESTINAL TRACT (GI) with Stent Removal;  Surgeon: Braden Ruiz MD;  Location: [facility] OR     ESOPHAGOSCOPY, GASTROSCOPY, DUODENOSCOPY (EGD), COMBINED N/A 6/4/2020    Procedure: ESOPHAGOGASTRODUODENOSCOPY (EGD);  Surgeon: Nate Morton MD;  Location:  GI     ESOPHAGOSCOPY, GASTROSCOPY, DUODENOSCOPY (EGD), COMBINED N/A 6/4/2020    Procedure: Esophagogastroduodenoscopy, With Foreign Body Removal;  Surgeon: Nate Morton MD;  Location: [facility] GI     ORTHOPEDIC SURGERY  2017    lumbar     ORTHOPEDIC SURGERY  2003    cervical   - Tubal ligation         Medications:     Medications Prior to Admission   Medication Sig Dispense Refill Last Dose     acetaminophen (TYLENOL) 500 MG tablet Take 500 mg by mouth        diphenhydrAMINE (BENADRYL) 25 MG capsule Take 25 mg by mouth        METOPROLOL SUCCINATE PO Take 25 mg by mouth daily         omeprazole (PRILOSEC) 20 MG DR capsule Take 20 mg by mouth daily         prochlorperazine (COMPAZINE) 5 MG tablet Take 10 mg by mouth        rivaroxaban ANTICOAGULANT (XARELTO) 15 MG TABS tablet Take 20 mg by mouth daily (with dinner)         senna-docusate (SENOKOT-S/PERICOLACE) 8.6-50 MG tablet Take 1 tablet by mouth        traMADol (ULTRAM) 50 MG tablet Take 25-50 mg by mouth              Allergies     Allergies   Allergen Reactions     Penicillins Rash     Erythromycin Diarrhea, GI Disturbance and Nausea     GI upset.       Oxycodone Other (See Comments)     Other reaction(s): Constipation  severe constipation  severe constipation  Constipation       Nickel Rash            Social History:   - , lives with  Zohaib, son and granddaughter Jennifer  - Denies alcohol, last drink 1/2020  - Denies tobacco        Family History:   - Many family members with pancreatitis, including father, several siblings, son and granddaughter  - 4 family members have undergo autoislet transplantation         Review of Systems:   A complete 10 point review of systems was obtained.  Please see the HPI for pertinent positives and negatives.  All other systems were reviewed and were found to be negative.          Physical Exam:   VS:  /85 (BP Location: Left arm)   Pulse 77   Temp 98.3  F (36.8  C) (Oral)   Resp 16     Wt:   Wt Readings from Last 2 Encounters:   04/23/20 68.1 kg (150 lb 2.1 oz)      Constitutional: No acute distress  Eyes: Sclera anicteric  HEENT: Wearing a mask  CV: RRR  Respiratory: CTAB, on RA  Abd: Nondistended, +bs, mild TTP in epigastric area  Skin: no jaundice  Lashonda: No Lashonda edema  Neuro: A&Ox3, normal affect         Laboratory:   No labs available at [facility]         Imaging/Procedures/Studies:   CT Abdomen Pelvis 6/15/20:  1.  Acute pancreatitis.  2.  Pancreatic ductal stent no longer visualized. Pancreatic ductal dilatation.  3.  Chronic thrombosis portal vein and proximal splenic vein with cavernous  transformation and numerous collaterals.  4.  Wall thickening of the stomach, duodenum and proximal jejunum presumably  secondary to the adjacent pancreatic inflammatory changes.  5.  Remainder stable    Abdominal US 6/11/20:  1.  Possible tiny polyp or stone noted within the gallbladder. Gallbladder  otherwise within normal limits with no evidence of sludge or gallbladder  wall  thickening.  2.  Multiple collaterals are noted in the papito hepatis adjacent to the  gallbladder and portal vein with thrombosis of the main portal vein and small  flow within the proximal portal vein. Findings compatible with cavernous  Transformation.    ERCP 4/24/20:  Impression:          - Stenotic prior minor papillotomy with mild ulceration,                        dilated and irregular dorsal duct likely due to stent                        induced injury from 5F 12cm rigid stent placed in                        [place] in place for several weeks.                        - Dorsal duct clearly communicating freely w ansa loop                        ventral duct of normal caliber, therefore pt does not                        have pancreas divisum                        - Minor papillotomy extended by 5-6mm to relieve                        papillotomystenosis, two flexible, soft 4 F stents                        placed, should pass spontaneously.                        - The major papilla was normal, with ventral pancreatic                        duct normal caliber, communicating freely with dorsal                        duct as above via ansa loop, confirming normal not                        divisum anatomy, protective 4F by 2cm Morton stent                        - Mildly dilated bile duct, biliary sphincterotomy                        perforemd as patient had history of gallbladder sludge,                        and so much ERCP intervention already done, this seemed                        most likely to be effective versus pancreatic therapy -                        self ejecting 4Fr by 7cm Morton stent that will pass                        - Overall impression is essentially normal pancreatic                        duct anatomy, dilated bile duct , and the most likely                        etiology of recent onset recurrent acute pancreatitis                        either biliary (sludge in GB at  previous EUS), versus                        obvious role of PRSS1, although initial symptomatic                        presentation so late in life is very unusual.   Recommendation:      - Observe patient in PACU for possible discharge same                        day.                        - Resume Xarelto (rivaroxaban) at prior dose in 3 days,                        ideally 4 days.                        - Confirm spontaneous stent passage by performing a KUB                        x-ray in 6 weeks.                        - Follow up with stent removal as needed, and observe                        for future episodes of pancreatitis - if they occur,                        will consider laparoscopic cholecystectomy but not                        clearly indicated at present as EUS showed no                        gallbladder pathology at today's exam.     EUS 4/24/2020:  Impression:          - Irregular dorsal duct with a stenosis and downstream                        dilation (rather than upstream dilation as would be                        expected with a malignant stricture). The ventral was                        followed from major papilla, but could not be followed                        to the dorsal duct. A prominent duct was seen arising                        from the dorsal duct leading toward the uncinate process.                        Review of prior MRI as suggestive of possible ansa loop,                        however we were not able to confirm this by EUS and                        would interpret the EUS as suggestive of divisum.                        - Dorsal pancreatic duct stent with inner tip apparently                        not in main duct lumen, likely in sidebranch with tip                        near the pancreatic surface.                        - Small amount of debris in main pancreatic duct.                        - Features of chronic pancreatitis as outlined above.                         These included one Major A and B and three Minor                        criteria, which are consistent with chronic pancreatitis                        by [place] Criteria.                        - Slightly dilated common bile duct given her age                        without choledocholithiasis.                        - Possible gallbladder stone/debris versus polyp. No                        large stones seen.                        - Portal vein thrombus with cavernous transformation.                        Small focus of calcification adjacent to the portal vein                        wall in this region without identifiable mass.   Recommendation:      - Proceed with ERCP with Dr. Morton.     MRCP 3/12/2020   IMPRESSION:  1.  Diffuse circumferential wall enhancement of the mildly dilated intra   and extra hepatic biliary ductal system raises concern for possible   ascending cholangitis.  2.  Trace amount of T2 hyperintense fluid seen surrounding the pancreatic   tail may reflect mild acute pancreatitis.  3.  Unchanged mild intra and extra hepatic biliary duct dilatation as well   as dilatation of the main pancreatic duct.  No discrete obstructing mass   or stone is seen.  Consider further evaluation with ERCP/EUS to exclude   the possibility of occult   obstructing stricture, stone, or lesion.  4.  A stable 13 mm cystic lesion is seen within the pancreatic neck which   was not discretely visualized on CT from 02/20/2020 and likely represents   a small peripancreatic collection related to recent pancreatitis versus a   side branch IPMN.  Recommend   attention on follow-up imaging.  5.  Geographic areas of hepatic steatosis.    EGD 3/20/20:  No evidence of varices, mild gastritis

## 2020-06-21 NOTE — H&P
[facility] Family Medicine History and Physical        Date of Admission:  6/21/2020  Date of Service: 6/21/2020         HPI      Chief Complaint   Abdominal pain    History is obtained from the patient    History of Present Illness   Amanda is a 70 year old female  who has a history of HTN, hereditary pancreatitis and chronic portal vein thrombosis and is transferred from [facility] to [facility] for management of an acute on chronic pancreatitis and for possible stenting by GI. She states her current flare started 5 days ago with abdominal pain and nausea/vomiting, which she was unable to manage at home with PO oxycodone and tramadol. She was admitted to [facility] 3 days ago, and was still unable to tolerate PO intake beyond medications and a small amount of clears. Initially she was found to have an elevated lipase in the 120s, with vss and labs otherwise unremarkable. CTAP showed acute pancreatitis and pancreatic duct dilation, otherwise stable. She was given IV fluids, pain control, antiemetics, without significant improvement in symptoms. The case was discussed with her GI specialists here at [facility] who recommended transfer for possible procedure.    Today she is several hours from her last IV dilaudid dose, and states her pain is about 5/10, with tolerable nausea. She otherwise is feeling well. She states she's been able to walk around her room prior to transfer, but was unable to walk the halls due to COVID-19 restrictions. She denies having any BM for the last 5 days, but is passing gas. She has not been taking any bowel regimen this time. Last emesis was this morning after trying to eat jello. She states usually between episodes of pancreatitis she does not take pain medication. Her last flare was April 2020, during which time she had stents placed by GI here.           History:   Review of Systems     Review of Systems   Constitutional: Negative for fatigue  and fever.   HENT: Negative for trouble swallowing.    Eyes: Negative for visual disturbance.   Respiratory: Negative for cough and shortness of breath.    Cardiovascular: Negative for chest pain and leg swelling.   Gastrointestinal: Positive for abdominal pain, constipation, nausea and vomiting. Negative for abdominal distention, blood in stool and diarrhea.   Genitourinary: Negative for difficulty urinating and dysuria.   Musculoskeletal: Negative for back pain and gait problem.   Skin: Negative for rash and wound.   Neurological: Negative for light-headedness and headaches.   Psychiatric/Behavioral: Negative for dysphoric mood.       Past Medical History    I have reviewed this patient's medical history and updated it with pertinent information if needed.   Past Medical History:   Diagnosis Date     History of ERCP     last stent placement via EUS/ERCP April 2020     HTN (hypertension)      Pancreatitis, chronic (H)      Portal vein thrombosis         Past Surgical History   I have reviewed this patient's surgical history and updated it with pertinent information if needed.  Past Surgical History:   Procedure Laterality Date     ENDOSCOPIC RETROGRADE CHOLANGIOPANCREATOGRAM N/A 4/23/2020    Procedure: ENDOSCOPIC RETROGRADE CHOLANGIOPANCREATOGRAPHY with Stent Exchange, Pancreatic Stent Placement, Biliary Duct Stent Placement, Pancreatic and Biliary Sphincterotomy, Pancreatic Sludge Removal;  Surgeon: Nate Morton MD;  Location: [facility] OR     ENDOSCOPIC ULTRASOUND UPPER GASTROINTESTINAL TRACT (GI) N/A 4/23/2020    Procedure: Diagnostic ENDOSCOPIC ULTRASOUND, ESOPHAGOSCOPY / UPPER GASTROINTESTINAL TRACT (GI) with Stent Removal;  Surgeon: Braden Ruiz MD;  Location: [facility] OR     ESOPHAGOSCOPY, GASTROSCOPY, DUODENOSCOPY (EGD), COMBINED N/A 6/4/2020    Procedure: ESOPHAGOGASTRODUODENOSCOPY (EGD);  Surgeon: Nate Morton MD;  Location: [facility] GI     ESOPHAGOSCOPY, GASTROSCOPY, DUODENOSCOPY (EGD),  COMBINED N/A 6/4/2020    Procedure: Esophagogastroduodenoscopy, With Foreign Body Removal;  Surgeon: Nate Morton MD;  Location:  GI     ORTHOPEDIC SURGERY  2017    lumbar     ORTHOPEDIC SURGERY  2003    cervical        Social History   Social History     Tobacco Use     Smoking status: Never Smoker     Smokeless tobacco: Never Used   Substance Use Topics     Alcohol use: Not Currently     Comment: drank socially before, nothing in 2020     Drug use: Never       Family History   I have reviewed this patient's family history and updated it with pertinent information if needed.   Family History   Problem Relation Age of Onset     Pancreatitis Father        Prior to Admission Medications   Prior to Admission Medications   Prescriptions Last Dose Informant Patient Reported? Taking?   METOPROLOL SUCCINATE PO  Self Yes No   Sig: Take 25 mg by mouth daily    acetaminophen (TYLENOL) 500 MG tablet   Yes No   Sig: Take 500 mg by mouth   diphenhydrAMINE (BENADRYL) 25 MG capsule   Yes No   Sig: Take 25 mg by mouth   omeprazole (PRILOSEC) 20 MG DR capsule  Self Yes No   Sig: Take 20 mg by mouth daily    prochlorperazine (COMPAZINE) 5 MG tablet  Self Yes No   Sig: Take 10 mg by mouth   rivaroxaban ANTICOAGULANT (XARELTO) 15 MG TABS tablet  Self Yes No   Sig: Take 20 mg by mouth daily (with dinner)    senna-docusate (SENOKOT-S/PERICOLACE) 8.6-50 MG tablet   Yes No   Sig: Take 1 tablet by mouth   traMADol (ULTRAM) 50 MG tablet   Yes No   Sig: Take 25-50 mg by mouth      Facility-Administered Medications: None     Allergies   Allergies   Allergen Reactions     Penicillins Rash     Erythromycin Diarrhea, GI Disturbance and Nausea     GI upset.       Oxycodone Other (See Comments)     Other reaction(s): Constipation  severe constipation  severe constipation  Constipation       Nickel Rash           Physical Exam      Vital Signs: Temp: 98.3  F (36.8  C) Temp src: Oral BP: 138/85 Pulse: 77   Resp: 16        Weight: 0 lbs 0  oz    Physical Exam  Constitutional:       General: She is not in acute distress.     Appearance: Normal appearance.   HENT:      Head: Normocephalic and atraumatic.   Cardiovascular:      Rate and Rhythm: Normal rate and regular rhythm.   Pulmonary:      Effort: Pulmonary effort is normal. No respiratory distress.      Breath sounds: Normal breath sounds.   Abdominal:      General: There is no distension.      Tenderness: There is abdominal tenderness (epigastric, moderate). There is guarding (mild with epigastric palpation). There is no rebound.   Musculoskeletal: Normal range of motion.      Right lower leg: No edema.      Left lower leg: No edema.   Skin:     General: Skin is warm and dry.      Capillary Refill: Capillary refill takes less than 2 seconds.   Neurological:      General: No focal deficit present.      Mental Status: She is alert and oriented to person, place, and time.   Psychiatric:         Mood and Affect: Mood normal.         Behavior: Behavior normal.         Assessment & Plan      Amanda is a 70 year old female admitted on 6/21/2020. She has a history of HTN, hereditary pancreatitis, portal vein thrombosis and is admitted for acute on chronic pancreatitis flare.    # Acute on chronic heredetary pancreatitis  6/15 CTAP shows acute pancreatitis, pancreatic ductal stent no longer visualized and pancreatic ductal dilation present, wall thickening of stomach, duodenum, and proximal jejunum. Pain recurred 6/19 and lipase elevated to 125. No lyte derangements, or abnormalities in cbc.   - Panc/bili GI consulted, recs appreciated  - CLD, NPO at midnight  - LR mIVF  - oxycodone 5-10 mg q 4h prn  - dilaudid 0.2 mg IV q 4h prn  - bowel regimen: senna, miralax, suppository, enema prn    # H/o portal vein thrombosis  - PTA rivaroxiban 20 mg at bedtime held pre-op      # HTN  - PTA Toprol XL 25 mg qd    # Pain Assessment:  Current Pain Score 6/4/2020   Patient currently in pain? denies   - Amanda is  experiencing pain due to pancreatitis. Pain management was discussed and the plan was created in a collaborative fashion.  Amanda's response to the current recommendations: engaged  - Please see the plan for pain management as documented above        Diet: NPO per Anesthesia Guidelines for Procedure/Surgery Except for: Meds  Fluids: LR @ 100 ml/h  DVT Prophylaxis: Pneumatic Compression Devices  Code Status: Full Code    Disposition Plan   Expected discharge: 2 - 3 days; recommended to prior living arrangement once adequate pain management/ tolerating PO medications and GI workup complete. Dispo:       Entered: Jenni Peacock 06/21/2020, 2:58 PM   Information in the above section will display in the discharge planner report.    Discussed with and examined by faculty.      DO [facility] In[facility]  [facility]   Pager: [phone]  Please see sticky note for cross cover information      Results:     Data   No lab results found in last 7 days.      No results found for this or any previous visit (from the past 24 hour(s)).

## 2020-06-22 NOTE — PROGRESS NOTES
[id number]    Patient AFVSS overnight. Complained of severe pain r/t pancreatitis; administered oxy & IV dilaudid. Abdomen was tender to palpation. NPO at midnight r/t endoscopy in AM. Pt admitted w/pancreatitis crisis. Independent. Alert & oriented. Plan: Dr. Morton to follow patient case.

## 2020-06-22 NOTE — ANESTHESIA CARE TRANSFER NOTE
Patient: Amanda Sandoval    Procedure(s):  ENDOSCOPIC RETROGRADE CHOLANGIOPANCREATOGRAPHY WITH BILIARY SPHINCTEROTOMY AND STENT PLACEMENT, MINOR PAPILLOTOMY WITH STENT PLACEMENT AND DEBRIS REMOVAL AND VENTRAL PANCREATIC DUCT SPHINCTEROTOMY WITH STENT PLACEMENT    Diagnosis: Idiopathic acute pancreatitis, unspecified complication status [K85.00]  Pancreatitis, recurrent [K85.90]  Diagnosis Additional Information: No value filed.    Anesthesia Type:   General     Note:  Airway :Face Mask  Patient transferred to:PACU  Handoff Report: Identifed the Patient, Identified the Reponsible Provider, Reviewed the pertinent medical history, Discussed the surgical course, Reviewed Intra-OP anesthesia mangement and issues during anesthesia, Set expectations for post-procedure period and Allowed opportunity for questions and acknowledgement of understanding      Vitals: (Last set prior to Anesthesia Care Transfer)    CRNA VITALS  6/22/2020 0846 - 6/22/2020 0918      6/22/2020             SpO2:  100 %    Resp Rate (observed):  16    EKG:  NSR                Electronically Signed By: ANABELL Preston CRNA  June 22, 2020  9:18 AM

## 2020-06-22 NOTE — ANESTHESIA POSTPROCEDURE EVALUATION
Anesthesia POST Procedure Evaluation    Patient: Amanda Sandoval   MRN:     [id number] Gender:   female   Age:    70 year old :      1950        Preoperative Diagnosis: Idiopathic acute pancreatitis, unspecified complication status [K85.00]  Pancreatitis, recurrent [K85.90]   Procedure(s):  ENDOSCOPIC RETROGRADE CHOLANGIOPANCREATOGRAPHY WITH BILIARY SPHINCTEROTOMY AND STENT PLACEMENT, MINOR PAPILLOTOMY WITH STENT PLACEMENT AND DEBRIS REMOVAL AND VENTRAL PANCREATIC DUCT SPHINCTEROTOMY WITH STENT PLACEMENT   Postop Comments: No value filed.     Anesthesia Type: General       Disposition: Outpatient   Postop Pain Control: Uneventful            Sign Out: Well controlled pain   PONV: No   Neuro/Psych: Uneventful            Sign Out: Acceptable/Baseline neuro status   Airway/Respiratory: Uneventful            Sign Out: Acceptable/Baseline resp. status   CV/Hemodynamics: Uneventful            Sign Out: Acceptable CV status   Other NRE: NONE   DID A NON-ROUTINE EVENT OCCUR? No         Last Anesthesia Record Vitals:  CRNA VITALS  2020 0846 - 2020 0946      2020             NIBP:  139/82    Ht Rate:  85          Last PACU Vitals:  Vitals Value Taken Time   /72 2020 10:29 AM   Temp 36.1  C (97  F) 2020 10:29 AM   Pulse 69 2020 10:10 AM   Resp 20 2020 10:29 AM   SpO2 96 % 2020 10:29 AM   Temp src     NIBP 139/82 2020  9:18 AM   Pulse     SpO2 100 % 2020  9:17 AM   Resp     Temp     Ht Rate 85 2020  9:18 AM   Temp 2     Vitals shown include unvalidated device data.      Electronically Signed By: Jose Delarosa MD, 2020, 10:46 AM

## 2020-06-22 NOTE — ANESTHESIA PREPROCEDURE EVALUATION
"Anesthesia Pre-Procedure Evaluation    Patient: Amanda Sandoval   MRN:     [id number] Gender:   female   Age:    70 year old :      1950        Preoperative Diagnosis: Idiopathic acute pancreatitis, unspecified complication status [K85.00]  Pancreatitis, recurrent [K85.90]   Procedure(s):  ENDOSCOPIC RETROGRADE CHOLANGIOPANCREATOGRAPHY     LABS:  CBC:   Lab Results   Component Value Date    WBC 5.3 2020    WBC 3.3 (L) 2020    HGB 10.4 (L) 2020    HGB 11.3 (L) 2020    HCT 33.0 (L) 2020    HCT 37.7 2020     2020     (L) 2020     BMP:   Lab Results   Component Value Date     2020     2020    POTASSIUM 3.5 2020    POTASSIUM 3.4 2020    CHLORIDE 108 2020    CHLORIDE 114 (H) 2020    CO2 29 2020    CO2 21 2020    BUN 8 2020    BUN 18 2020    CR 0.61 2020    CR 0.61 2020    GLC 77 2020    GLC 91 2020     COAGS:   Lab Results   Component Value Date    INR 1.28 (H) 2020     POC:   Lab Results   Component Value Date    BGM 69 (L) 2020     OTHER:   Lab Results   Component Value Date    PASCUAL 9.0 2020    ALBUMIN 2.3 (L) 2020    PROTTOTAL 5.2 (L) 2020    ALT 18 2020    AST 19 2020    ALKPHOS 132 2020    BILITOTAL 0.5 2020    LIPASE 269 2020    AMYLASE 67 2020        Preop Vitals    BP Readings from Last 3 Encounters:   20 117/57   20 116/69   20 (!) 141/72    Pulse Readings from Last 3 Encounters:   20 74   20 71   20 68      Resp Readings from Last 3 Encounters:   20 16   20 16   04/23/20 16    SpO2 Readings from Last 3 Encounters:   20 95%   20 96%   20 98%      Temp Readings from Last 1 Encounters:   20 36.8  C (98.2  F) (Oral)    Ht Readings from Last 1 Encounters:   20 1.626 m (5' 4\")      Wt Readings from Last 1 " "Encounters:   04/23/20 68.1 kg (150 lb 2.1 oz)    Estimated body mass index is 25.77 kg/m  as calculated from the following:    Height as of 4/23/20: 1.626 m (5' 4\").    Weight as of 4/23/20: 68.1 kg (150 lb 2.1 oz).     LDA:  Peripheral IV 06/21/20 Right Lower forearm (Active)   Site Assessment WDL 06/22/20 0724   Line Status Saline locked 06/22/20 0724   Phlebitis Scale 0-->no symptoms 06/22/20 0724   Infiltration Scale 0 06/22/20 0724   Extravasation? No 06/22/20 0724   Number of days: 1       ETT (Active)   Number of days: 0        Past Medical History:   Diagnosis Date     History of ERCP     last stent placement via EUS/ERCP April 2020     HTN (hypertension)      Pancreatitis, chronic (H)      Portal vein thrombosis       Past Surgical History:   Procedure Laterality Date     ENDOSCOPIC RETROGRADE CHOLANGIOPANCREATOGRAM N/A 4/23/2020    Procedure: ENDOSCOPIC RETROGRADE CHOLANGIOPANCREATOGRAPHY with Stent Exchange, Pancreatic Stent Placement, Biliary Duct Stent Placement, Pancreatic and Biliary Sphincterotomy, Pancreatic Sludge Removal;  Surgeon: Nate Morton MD;  Location: [facility] OR     ENDOSCOPIC ULTRASOUND UPPER GASTROINTESTINAL TRACT (GI) N/A 4/23/2020    Procedure: Diagnostic ENDOSCOPIC ULTRASOUND, ESOPHAGOSCOPY / UPPER GASTROINTESTINAL TRACT (GI) with Stent Removal;  Surgeon: Braden Ruiz MD;  Location: [facility] OR     ESOPHAGOSCOPY, GASTROSCOPY, DUODENOSCOPY (EGD), COMBINED N/A 6/4/2020    Procedure: ESOPHAGOGASTRODUODENOSCOPY (EGD);  Surgeon: Nate Morton MD;  Location:  GI     ESOPHAGOSCOPY, GASTROSCOPY, DUODENOSCOPY (EGD), COMBINED N/A 6/4/2020    Procedure: Esophagogastroduodenoscopy, With Foreign Body Removal;  Surgeon: Nate Morton MD;  Location:  GI     ORTHOPEDIC SURGERY  2017    lumbar     ORTHOPEDIC SURGERY  2003    cervical      Allergies   Allergen Reactions     Penicillins Rash     Erythromycin Diarrhea, GI Disturbance and Nausea     GI upset.       Oxycodone " Other (See Comments)     Other reaction(s): Constipation  severe constipation  severe constipation  Constipation       Nickel Rash        Anesthesia Evaluation     . Pt has had prior anesthetic. Type: General           ROS/MED HX    ENT/Pulmonary:  - neg pulmonary ROS     Neurologic:  - neg neurologic ROS     Cardiovascular:         METS/Exercise Tolerance:     Hematologic:     (+) History of blood clots pt is anticoagulated, -      Musculoskeletal:  - neg musculoskeletal ROS       GI/Hepatic:         Renal/Genitourinary:  - ROS Renal section negative       Endo:  - neg endo ROS       Psychiatric:  - neg psychiatric ROS       Infectious Disease:  - neg infectious disease ROS       Malignancy:      - no malignancy   Other:                         PHYSICAL EXAM:   Mental Status/Neuro: A/A/O   Airway: Facies: Feasible  Mallampati: I  Mouth/Opening: Full  TM distance: > 6 cm  Neck ROM: Full   Respiratory: Auscultation: CTAB     Resp. Rate: Normal     Resp. Effort: Normal      CV: Rhythm: Regular  Rate: Age appropriate  Heart: Normal Sounds  Edema: None   Comments:      Dental: Normal Dentition                Assessment:   ASA SCORE: 2    H&P: History and physical reviewed and following examination; no interval change.   Smoking Status:  Non-Smoker/Unknown   NPO Status: NPO Appropriate     Plan:   Anes. Type:  General   Pre-Medication: None   Induction:  IV (Standard)   Airway: ETT; Oral   Access/Monitoring: PIV   Maintenance: Balanced     Postop Plan:   Postop Pain: Opioids  Postop Sedation/Airway: Not planned  Disposition: Inpatient/Admit     PONV Management:   Adult Risk Factors: Female, Non-Smoker, Postop Opioids   Prevention: Ondansetron, Dexamethasone     CONSENT: Direct conversation   Plan and risks discussed with: Patient   Blood Products: Consent Deferred (Minimal Blood Loss)                   Jose Delarosa MD

## 2020-06-22 NOTE — BRIEF OP NOTE
[facility], [place]    Brief Operative Note    Pre-operative diagnosis: Idiopathic acute pancreatitis, unspecified complication status [K85.00]  Pancreatitis, recurrent [K85.90]  Post-operative diagnosis Dorsal pancreatic sphincter stenosis, ventral sphincter stenosis, chronic pancreatitis, biliary stenosis    Procedure: Procedure(s):  ENDOSCOPIC RETROGRADE CHOLANGIOPANCREATOGRAPHY WITH BILIARY SPHINCTEROTOMY AND STENT PLACEMENT, MINOR PAPILLOTOMY WITH STENT PLACEMENT AND DEBRIS REMOVAL AND VENTRAL PANCREATIC DUCT SPHINCTEROTOMY WITH STENT PLACEMENT  Surgeon: Surgeon(s) and Role:     * Nate Morton MD - Primary  Anesthesia: General   Estimated blood loss: None  Drains: None  Specimens: * No specimens in log *  Findings:   Stenotic biliary sphincterotomy, dilated bile duct, extended sphincterotomy and placed unflanged stent  Ventral ansa loop communicates w dorsal duct, stenotic, ventral sphincterotomy done, 5F 2cm straight stent  Stentoic minor papillotomy, dilated dorsal duct w debris. Extended about 4-5mm, dual soft stents (5F and 4F placed w drainage of debris.  Complications: None.  Implants:   Implant Name Type Inv. Item Serial No.  Lot No. LRB No. Used Action   STENT GEENEN PANCREA SOF-FLEX [id number] Stent STENT GEENEN PANCREA SOF-FLEX [id number]  [facility] [id number] N/A 1 Implanted   STENT MORTON PANCREA FLEX [id number] SGL PIGTAIL Stent STENT MORTON PANCREA FLEX [id number] SGL PIGTAIL  [employer] M70- N/A 1 Implanted   STENT MORTON PANCREA FLEX [id number] SGL PIGTAIL Stent STENT MORTON PANCREA FLEX [id number] SGL PIGTAIL  [employer] Y70- N/A 1 Implanted   STENT MORTON PANCREA FLEX [id number] W/O FLANGE SGL PIGTAIL Stent STENT MORTON PANCREA FLEX [id number] W/O FLANGE SGL PIGTAIL  [employer] C42- N/A 1 Implanted and Explanted   STENT MORTON PANCREA FLEX [id number] W/O FLANGE SGL PIGTAIL Stent STENT MORTON PANCREA FLEX [id number] W/O FLANGE SGL  PIGTAIL  [place] A05- N/A 1 Implanted     Functional obstruction of pancreatic duct, debris contained dilated pancreatic ducts.  Is at risk for bleeding so please hold anticoagulation for 3 days.  Repeat ERCP 3 weeks, will dilate and multiple stent dorsal duct.

## 2020-06-22 NOTE — PROGRESS NOTES
[facility], [facility] Progress Note - [facility]       Main Plans for Today   - Procedure with GI today  - no AC for 3 days  - NPO for today and IVF    Assessment & Plan   Amanda is a 70 year old female admitted on 6/21/2020. She has a history of HTN, hereditary pancreatitis, portal vein thrombosis and is admitted for acute on chronic pancreatitis flare.     # Acute on chronic heredetary pancreatitis  6/15 CTAP shows acute pancreatitis, pancreatic ductal stent no longer visualized and pancreatic ductal dilation present, wall thickening of stomach, duodenum, and proximal jejunum. Pain recurred 6/19 and lipase elevated to 125. No lyte derangements, or abnormalities in cbc. Procedure today for stent placement and ERCP with GI.  - Panc/bili GI consulted, recs appreciated  - NPO except ice chip  - LR mIVF  - oxycodone 5-10 mg q 4h prn  - dilaudid 0.2 mg IV q 4h prn  - bowel regimen: senna, miralax, suppository, enema prn     # H/o portal vein thrombosis  - PTA rivaroxiban 20 mg at bedtime held for 3 d post-procedure     # HTN  - PTA Toprol XL 25 mg qd    # Pain Assessment:  Current Pain Score 6/21/2020   Patient currently in pain? yes   Pain descriptors Aching   - Amanda is experiencing pain due to acute pancreatitis. Pain management was discussed and the plan was created in a collaborative fashion.  Amanda's response to the current recommendations: engaged  - Please see the plan for pain management as documented above        Diet: NPO for Medical/Clinical Reasons Except for: Ice Chips, Meds  Fluids: 125 ml/h D5LR  DVT Prophylaxis: Pneumatic Compression Devices  Code Status: Full Code    Disposition Plan   Expected discharge: Tomorrow, recommended to prior living arrangement once adequate pain management/ tolerating PO medications. Dispo:       Entered: Jenni Peacock 06/22/2020, 8:18 AM   Information in the above section will display in the discharge planner  report.      The patient's care was discussed with the Attending Physician, Dr. Collado.    Quinten Peacock,   [facility]  Pager: [phone]  Please see sticky note for cross cover information    Interval History   Amanda is feeling well post-procedure. Denies much pain in her abdomen but did have significant nausea and emesis x1 after procedure, improved with phenergan. Denies hunger. Otherwise feels fine    Physical Exam   Vital Signs: Temp: 98.2  F (36.8  C) Temp src: Oral BP: 117/57 Pulse: 74   Resp: 16 SpO2: 95 % O2 Device: None (Room air)    Weight: 0 lbs 0 oz  General Appearance: Mildly sleepy and slurring words, pleasant, aox3, engaged in complex discussion about covid  Respiratory: ctab, no rds  Cardiovascular: regular rate and rhythm, no murmur, trace pedal edema  GI: abdomen soft, mild epigastric ttp, no guarding or rebound  Skin: no wounds or rashes        Data   Recent Labs   Lab 06/22/20  0601   WBC 5.3   HGB 10.4*   MCV 91      INR 1.28*      POTASSIUM 3.5   CHLORIDE 108   CO2 29   BUN 8   CR 0.61   ANIONGAP 5   PASCUAL 9.0   GLC 77   ALBUMIN 2.3*   PROTTOTAL 5.2*   BILITOTAL 0.5   ALKPHOS 132   ALT 18   AST 19     Recent Results (from the past 24 hour(s))   XR Surgery ALANNAH Fluoro L/T 5 Min w Stills    Narrative    This exam was marked as non-reportable because it will not be read by a   radiologist or a [facility] non-radiologist provider.           Medications     dextrose 5% lactated ringers 125 mL/hr at 06/22/20 1209       metoprolol succinate ER  25 mg Oral Daily     omeprazole  20 mg Oral Daily     sodium chloride (PF)  3 mL Intracatheter Q8H

## 2020-06-22 NOTE — PROVIDER NOTIFICATION
Pt's BG 69 in pre-op; per anesthesia, will initiate d5 gtt in OR, no further orders at this time. Pt remains asymptomatic and stable.

## 2020-06-22 NOTE — PLAN OF CARE
Patient admit to 7a at 1420  VS: stable  Neuro:  A&O  Mobility: up ad tatyana  Discomfort: complains of abdominal pain; has PRN oxycodone, tylenol, and dilaudid available. No complaints of nausea  LDAs: PIV intact with LR @100mL/hr  : voiding without issue  GI: BM after fleets enema  Plan:   will continue to monitor

## 2020-06-22 NOTE — PROGRESS NOTES
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 PMHx also includes HTN.      #. Acute recurrent pancreatitis 2/2 PRSS1 gene positive  #. Portal vein + proximal splenic thrombosis, w/cavernous transformation and collaterals     Patient developed initial episode in February 2020 with several recurrent episodes of pancreatitis since and development of PVT requiring anticoagulation. She has changes c/f chronic pancreatitis in her dorsal duct. She underwent a partial minor papillotomy and stent placement in 3/2020; this stent was subsequently removed in 4/2020 - at that time, the minor papillotomy was extended and a biliary sphincterotomy was performed. Her stents are currently out and she has developed recurrent pancreatitis.     BISAP score: 1 (given age)  SIRS: 0    Patient underwent ERCP today, notable for stenotic biliary sphincterotomy, dilated bile duct, stenotic ventral and dorsal PD, had ventral sphincterotomy done, with 5F 2cm straight stent placement and stentoic minor papillotomy, dilated dorsal duct w debris. Extended about 4-5mm, dual soft stents (5F and 4F placed w drainage of debris).    RECOMMENDATIONS:  - Keep NPO today given postoperative nausea and emesis  - Avoid anticoagulation (NOAC and prophylactic) for at least 3 days given risk of postsphincterotomy bleeding  - IVFs, antiemetics per primary team  - May advance diet slowly tomorrow if no pain, N/V  - Will continue to follow with you    GI outpatient follow up recommendations: Repeat ERCP in 3 weeks to dilate dorsal duct, upsize and add number of dorsal duct stents.    The patient was discussed and plan agreed upon with Dr. Ruiz, GI staff.    Jared Linares MD  GI Fellow  #[id number]  _______________________________________________________________  S: Seen following ERCP. Reports less abdominal pain than  prior, feels nauseous and had one episode of bilious emesis. No fever or chills.     O:  Blood pressure 132/72, pulse 81, temperature 97  F (36.1  C), temperature source Axillary, resp. rate 20, SpO2 96 %.    Constitutional: No acute distress  Eyes: Sclera anicteric  CV: RRR  Respiratory: CTAB, on RA  Abd: Nondistended, +bs, mild TTP in epigastric area  Skin: no jaundice  [place]ash[place]: No L[place] edema  Neuro: A&Ox3, normal affect    LABS:  BMP  Recent Labs   Lab 06/22/20  0601      POTASSIUM 3.5   CHLORIDE 108   PASCUAL 9.0   CO2 29   BUN 8   CR 0.61   GLC 77     CBC  Recent Labs   Lab 06/22/20  0601   WBC 5.3   RBC 3.62*   HGB 10.4*   HCT 33.0*   MCV 91   MCH 28.7   MCHC 31.5   RDW 14.3        INR  Recent Labs   Lab 06/22/20  0601   INR 1.28*     LFTs  Recent Labs   Lab 06/22/20  0601   ALKPHOS 132   AST 19   ALT 18   BILITOTAL 0.5   PROTTOTAL 5.2*   ALBUMIN 2.3*      PANCNo lab results found in last 7 days.

## 2020-06-23 NOTE — PLAN OF CARE
Blood pressure (!) 148/76, pulse 81, temperature 97.8  F (36.6  C), temperature source Oral, resp. rate 16, SpO2 97 %.  Pt. Has tolerated a regular diet, denies pain and feels ready to discharge.  She described having a feeling of fullness, but not distressing.  We reviewed discharge orders and follow-up appt.s. ERCP should be done in approx 3 weeks, per the GI note.  Pt. Was wheeled to the front entrance and was driven home by her . No discharge meds were ordered.

## 2020-06-23 NOTE — PHARMACY-ADMISSION MEDICATION HISTORY
"Admission medication history interview status for the 6/21/2020 admission is complete. See Epic admission navigator for allergy information, pharmacy, prior to admission medications and immunization status.     Medication history interview sources:  Patient (good historian)    Changes made to PTA medication list (reason)  Added: Oxcarbazepine (patient has not used in 4 years however has this on hand for trigeminal neuralgia pain)  Deleted: None  Changed: Added dosing/frequency details to all PRN meds (all lacked instructions). Changed rivaroxaban from 15mg tablet to 20mg tablet to match patient's current prescription,     Additional medication history information (including reliability of information, actions taken by pharmacist):  - Patient uses APAP twice daily regularly  - Patient has only used tramadol one time since prescription was issued. Had a prescription for oxycodone in february but does not use this regularly.  - Patient has been on omeprazole for \"years\" btu takes this at bedtime. She should be reminded that it most ideal to take 30 minutes before a meal.  - Patient only uses senna/docusate 2-3 times per month      Prior to Admission medications    Medication Sig Last Dose Taking? Auth Provider   acetaminophen (TYLENOL) 500 MG tablet Take 500-1,000 mg by mouth every 6 hours as needed for mild pain  Past Week Yes Reported, Patient   diphenhydrAMINE (BENADRYL) 25 MG capsule Take 25 mg by mouth nightly as needed for sleep  Past Week Yes Reported, Patient   METOPROLOL SUCCINATE PO Take 25 mg by mouth daily   Yes Reported, Patient   omeprazole (PRILOSEC) 20 MG DR capsule Take 20 mg by mouth daily  Past Week Yes Reported, Patient   rivaroxaban ANTICOAGULANT (XARELTO) 20 MG TABS tablet Take 20 mg by mouth daily (with dinner)  Yes Unknown, Entered By History   senna-docusate (SENOKOT-S/PERICOLACE) 8.6-50 MG tablet Take 1 tablet by mouth daily as needed for constipation   Yes Reported, Patient   traMADol " (ULTRAM) 50 MG tablet Take 50 mg by mouth every 6 hours as needed for moderate pain   Yes Reported, Patient   OXcarbazepine (TRILEPTAL) 150 MG tablet Take 150 mg by mouth as needed (Patient uses very infrequently (last 4 years ago) but keeps on hand for trigeminal neuralgia.)   Unknown, Entered By History   prochlorperazine (COMPAZINE) 5 MG tablet Take 5-10 mg by mouth every 6 hours as needed for nausea or vomiting    Reported, Patient         Medication history completed by: Ynes Chavira, PharmD

## 2020-06-23 NOTE — PLAN OF CARE
[id number]    Patient AFVSS overnight. Alert & oriented x4. Complained of general body aches. Reported abdominal soreness but said that was her baseline. Administered tylenol. Patient denied nausea. Clear liquid diet, NPO if patient indicates nausea or pain. Pt requested fleet enema for morning; stated that her stomach was a little bloated and cramping. Sent text page to team, no response. PIV w/D5LR running at 125mL/hour. Probably discharge to home today.     /63 (BP Location: Left arm)   Pulse 81   Temp 98.3  F (36.8  C) (Oral)   Resp 16   SpO2 94%

## 2020-06-23 NOTE — PLAN OF CARE
[id number]:    /63 (BP Location: Left arm)   Pulse 81   Temp 98.3  F (36.8  C) (Oral)   Resp 16   SpO2 94%      VS: stable on room air. Pt was on capno when returning from ERCP this morning but team ok to discontinue due to stable condition.   BG: n/a.   LABS: see chart.   Pain/Nausea: denied pain but reported nausea when returning from ERCP. Pt had small emesis x 1 but nausea resolved on own.   Neuro: intact.  Diet: clear liquid and pt tolerating well with no nausea.   LDA: PIV with D5LR @ 125 ml/hr.   GI/: voiding and LBM today.   Skin: intact with some bruising.   Activity: independent.   Tests/Procedures: ERCP this morning with stent exchanges.   Plan: continue POC with possible discharge tomorrow or next day.     All care explained and questions answered. Will continue to monitor and notify team of changes.

## 2020-06-23 NOTE — DISCHARGE SUMMARY
[facility], [facility] Medicine Discharge Summary- [facility]  Service    Date of Admission:  6/21/2020  Date of Service: 6/23/2020  Date of Discharge:  No discharge date for patient encounter.  Discharging Attending Provider:  Wesly Collado MD  Discharge Team: [facility]    Discharge Diagnoses   Hereditary Pancreatitis  Acute on chronic pancreatitis  Mild hypokalemia    Follow-ups Needed After Discharge   Follow-up PCP in 1 week: check K    Hospital Course   Amanda Sandoval was admitted on 6/21/2020 for acute on chronic pancreatitis.  She was initially admitted to [facility] with acute pancreatitis lipase 120 and CT findings consistent with acute pancreatitis. She was unable to manage to advance her diet despite bowel rest, IV fluid resuscitation, and pain/nausea control, so she was transferred to [facility] for management with her GI specialist. EUS and ERCP were conducted and found sludge in blocked pancreatic ducts that were cleared out and stented. She will follow up with her GI specialist in a few weeks for further management. On day of discharge POD1, she was tolerating a regular diet without any opioid pain medications and minimal antiemetics. She was not prescribed any opioids or antiemetics upon discharge, and stated she would use her home supply if needed. Her chronic anticoagulation on xarelto was held pre-operatively with plans to resume 3 days post-operatively. Her K was mildly low at 3.3 on day of discharge, she was given 1 dose of oral repletion and told to follow up with PCP to re-check this.    # Discharge Pain Plan:   - Patient currently has NO PAIN and is not being prescribed pain medications on discharge.    Consultations This Hospital Stay   GI PANCREATICOBILIARY ADULT IP CONSULT    Code Status   Full Code       The patient was discussed with Dr. Tobias Peacock, DO [facility]  [facility]    Pager:[phone]_  ___________________________________________________________________  Review of Systems:  CONSTITUTIONAL: NEGATIVE for fever, chills, change in weight  INTEGUMENTARY/SKIN: NEGATIVE for worrisome rashes, moles or lesions  EYES: NEGATIVE for vision changes or irritation  ENT/MOUTH: NEGATIVE for ear, mouth and throat problems  RESP: NEGATIVE for significant cough or SOB  BREAST: NEGATIVE for masses, tenderness or discharge  CV: NEGATIVE for chest pain, palpitations or peripheral edema  GI: NEGATIVE for nausea, abdominal pain, heartburn, or change in bowel habits  : NEGATIVE for frequency, dysuria, or hematuria  MUSCULOSKELETAL: NEGATIVE for significant arthralgias or myalgia  NEURO: NEGATIVE for weakness, dizziness or paresthesias  ENDOCRINE: NEGATIVE for temperature intolerance, skin/hair changes  HEME/ALLERGY: NEGATIVE for bleeding problems  PSYCHIATRIC: NEGATIVE for changes in mood or affect  Physical Exam   Vital Signs: Temp: 97.8  F (36.6  C) Temp src: Oral BP: (!) 148/76 Pulse: 81 Heart Rate: 65 Resp: 16 SpO2: 97 % O2 Device: None (Room air)    Weight: 0 lbs 0 oz    General Appearance: Alert, oriented, pleasant  Respiratory: Ctab, no rds  Cardiovascular: regular rate and rhythm, no murmur  GI: abdomen soft, minimal epigastric ttp, no rebound or guarding  Skin: no wounds or rashes      Significant Results and Procedures   Most Recent 3 CBC's:  Recent Labs   Lab Test 06/23/20 0620 06/22/20  0601 04/23/20  0715   WBC 5.8 5.3 3.3*   HGB 12.5 10.4* 11.3*   MCV 91 91 98    168 139*     Most Recent 3 BMP's:  Recent Labs   Lab Test 06/23/20 0620 06/22/20  0601 04/23/20  0715    142 142   POTASSIUM 3.3* 3.5 3.4   CHLORIDE 110* 108 114*   CO2 27 29 21   BUN 8 8 18   CR 0.55 0.61 0.61   ANIONGAP 6 5 8   PASCUAL 10.0 9.0 9.5   * 77 91     Most Recent 2 LFT's:  Recent Labs   Lab Test 06/23/20  0620 06/22/20  0601   AST 22 19   ALT 18 18   ALKPHOS 191* 132   BILITOTAL 0.4 0.5     Most Recent 3  INR's:  Recent Labs   Lab Test 06/22/20  0601   INR 1.28*   ,   Results for orders placed or performed during the hospital encounter of 06/21/20   XR Surgery ALANNAH Fluoro L/T 5 Min w Stills    Narrative    This exam was marked as non-reportable because it will not be read by a   radiologist or a [facility] non-radiologist provider.               Pending Results   These results will be followed up by Jl Johnson    Unresulted Labs Ordered in the Past 30 Days of this Admission     No orders found from 5/22/2020 to 6/22/2020.             Primary Care Physician   Jl Johnson    Discharge Disposition   Discharged to home  Condition at discharge: Stable    Discharge Orders      Reason for your hospital stay    You had a pancreatitis flare. GI scoped you and stented your pancreas.     Adult [facility] Follow-up and recommended labs and tests    Follow up with primary care provider, Jl Johnson, within 7 days for hospital follow- up.  The following labs/tests are recommended: BMP.      Appointments on [place] and/or [facility] (with [facility] or [facility] provider or service). Call [phone] if you haven't heard regarding these appointments within 7 days of discharge.     Activity    Your activity upon discharge: activity as tolerated     When to contact your care team    Call your primary doctor if you have any of the following: temperature greater than 100,  increased shortness of breath or increased pain.     Discharge Instructions    Re-start your blood thinner on Thursday 6/25     Full Code     Diet    Follow this diet upon discharge: Orders Placed This Encounter      Advance Diet as Tolerated: Regular Diet Adult     Discharge Medications   Discharge Medication List as of 6/23/2020 12:00 PM      CONTINUE these medications which have NOT CHANGED    Details   acetaminophen (TYLENOL) 500 MG tablet Take 500-1,000 mg by mouth every 6 hours as needed for mild pain , Historical      diphenhydrAMINE (BENADRYL) 25 MG capsule  Take 25 mg by mouth nightly as needed for sleep , Historical      METOPROLOL SUCCINATE PO Take 25 mg by mouth daily , Historical      omeprazole (PRILOSEC) 20 MG DR capsule Take 20 mg by mouth daily , Historical      rivaroxaban ANTICOAGULANT (XARELTO) 20 MG TABS tablet Take 20 mg by mouth daily (with dinner), Historical      senna-docusate (SENOKOT-S/PERICOLACE) 8.6-50 MG tablet Take 1 tablet by mouth daily as needed for constipation , Historical      traMADol (ULTRAM) 50 MG tablet Take 50 mg by mouth every 6 hours as needed for moderate pain , Historical      OXcarbazepine (TRILEPTAL) 150 MG tablet Take 150 mg by mouth as needed (Patient uses very infrequently (last 4 years ago) but keeps on hand for trigeminal neuralgia.), Historical      prochlorperazine (COMPAZINE) 5 MG tablet Take 5-10 mg by mouth every 6 hours as needed for nausea or vomiting , Historical           Allergies   Allergies   Allergen Reactions     Penicillins Rash     Erythromycin Diarrhea, GI Disturbance and Nausea     GI upset.       Oxycodone Other (See Comments)     Other reaction(s): Constipation  severe constipation  severe constipation  Constipation       Nickel Rash

## 2020-06-23 NOTE — PROGRESS NOTES
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 PMHx also includes HTN.      #. Acute recurrent pancreatitis 2/2 PRSS1 gene positive  #. Portal vein + proximal splenic thrombosis, w/cavernous transformation and collaterals     Patient developed initial episode in February 2020 with several recurrent episodes of pancreatitis since and development of PVT requiring anticoagulation. She has changes c/f chronic pancreatitis in her dorsal duct. She underwent a partial minor papillotomy and stent placement in 3/2020; this stent was subsequently removed in 4/2020 - at that time, the minor papillotomy was extended and a biliary sphincterotomy was performed. Her stents are currently out and she has developed recurrent pancreatitis.     BISAP score: 1 (given age)  SIRS: 0    Patient underwent ERCP 6/22, notable for stenotic biliary sphincterotomy, dilated bile duct, stenotic ventral and dorsal PD, had ventral sphincterotomy done, with 5F 2cm straight stent placement and stentoic minor papillotomy, dilated dorsal duct w debris. Extended about 4-5mm, dual soft stents (5F and 4F placed w drainage of debris). Doing well today with mild abdominal discomfort, otherwise not concerns. Labs are reassuring and she's tolerating regular diet    RECOMMENDATIONS:  - Regular diet as tolerated  - Avoid anticoagulation (NOAC and prophylactic) for at least 2 more days given risk of postsphincterotomy bleeding (may resume Xeralto on Thursday 6/25)  - OK to discharge home today from GI perspective     GI outpatient follow up recommendations: Repeat ERCP in 3 weeks to dilate dorsal duct, upsize and add number of dorsal duct stents.    The patient was discussed and plan agreed upon with Dr. Ruiz, GI staff.    Jared Linares MD  GI  Fellow  #[phone]  _______________________________________________________________  S: No acute events, continues to feel better, reports mild abdominal discomfort, denies N/V, fevers or chills. Tolerating PO intake     O:  Blood pressure (!) 148/76, pulse 81, temperature 97.8  F (36.6  C), temperature source Oral, resp. rate 16, SpO2 97 %.    Constitutional: No acute distress  Eyes: Sclera anicteric  CV: RRR  Respiratory: CTAB, on RA  Abd: Nondistended, +bs, mild TTP in epigastric area  Skin: no jaundice  Lashonda: No Lashonda edema  Neuro: A&Ox3, normal affect    LABS:  BMP  Recent Labs   Lab 06/23/20  0620 06/22/20  0601    142   POTASSIUM 3.3* 3.5   CHLORIDE 110* 108   PASCUAL 10.0 9.0   CO2 27 29   BUN 8 8   CR 0.55 0.61   * 77     CBC  Recent Labs   Lab 06/23/20  0620 06/22/20  0601   WBC 5.8 5.3   RBC 4.28 3.62*   HGB 12.5 10.4*   HCT 39.1 33.0*   MCV 91 91   MCH 29.2 28.7   MCHC 32.0 31.5   RDW 14.0 14.3    168     INR  Recent Labs   Lab 06/22/20  0601   INR 1.28*     LFTs  Recent Labs   Lab 06/23/20  0620 06/22/20  0601   ALKPHOS 191* 132   AST 22 19   ALT 18 18   BILITOTAL 0.4 0.5   PROTTOTAL 6.2* 5.2*   ALBUMIN 2.9* 2.3*      PANCNo lab results found in last 7 days.

## 2020-06-24 NOTE — PROGRESS NOTES
[facility]: Post-Discharge Note  SITUATION                                                      Admission:    Admission Date: 06/21/20   Reason for Admission: Hereditary Pancreatitis  Discharge:   Discharge Date: 06/23/20  Discharge Diagnosis: Hereditary Pancreatitis  Discharge Service: Family Medicine    BACKGROUND                                                      Amanda Sandoval was admitted on 6/21/2020 for acute on chronic pancreatitis.  She was initially admitted to [facility] with acute pancreatitis lipase 120 and CT findings consistent with acute pancreatitis. She was unable to manage to advance her diet despite bowel rest, IV fluid resuscitation, and pain/nausea control, so she was transferred to [facility] for management with her GI specialist. EUS and ERCP were conducted and found sludge in blocked pancreatic ducts that were cleared out and stented. She will follow up with her GI specialist in a few weeks for further management. On day of discharge POD1, she was tolerating a regular diet without any opioid pain medications and minimal antiemetics. She was not prescribed any opioids or antiemetics upon discharge, and stated she would use her home supply if needed. Her chronic anticoagulation on xarelto was held pre-operatively with plans to resume 3 days post-operatively. Her K was mildly low at 3.3 on day of discharge, she was given 1 dose of oral repletion and told to follow up with PCP to re-check this.    ASSESSMENT      Discharge Assessment  Patient reports symptoms are: Improved  Does the patient have all of their medications?: Yes  Does patient know what their new medications are for?: Yes  Does patient have a follow-up appointment scheduled?: No  Does patient have any other questions or concerns?: No    Post-op  Did the patient have surgery or a procedure: No(ERCP)  Fever: No  Chills: No  Eating & Drinking: eating and drinking without complaints/concerns  PO Intake: regular  diet  Bowel Function: normal  Urinary Status: voiding without complaint/concerns    PLAN                                                      Outpatient Plan:      Follow up with primary care provider, Jl Johnson, within 7 days for hospital follow- up.  The following labs/tests are recommended: BMP.    No future appointments.        Nunu Kate, CMA

## 2020-06-26 NOTE — TELEPHONE ENCOUNTER
Post ERCP (6/23/2020) with Dr. Morton: Follow-up    Post procedure recommendations:   Repeat ERCP in 3 weeks to dilate dorsal duct, upsize                        of add number of dorsal duct stents, on which pt may                        require fairly long term    Orders placed:   Please assist in scheduling:     Procedure/Imaging/Clinic: ERCP  Physician: Dr. Morton    Timing: 3 weeks  Procedure length:90 minutes  Anesthesia:general  Dx: chronic panc  Tier:2  Location: [facility]     Patient states : having soreness, no pain, taking tylenol, has oxy if she needs. No Concerning symptoms.     Clinic contact and scheduling numbers verified for future questions/concerns. On Xerelto, knows to hold 2 days prior. Knows to get COVID test 3 days prior. Can use hospital documentation for preop.     Maite Corona RN Care Coordinator

## 2020-06-30 PROBLEM — K86.1 CHRONIC PANCREATITIS (H): Status: ACTIVE | Noted: 2020-01-01

## 2020-07-20 NOTE — ANESTHESIA PREPROCEDURE EVALUATION
"Anesthesia Pre-Procedure Evaluation    Patient: Amanda Sandoval   MRN:     [id number] Gender:   female   Age:    70 year old :      1950        Preoperative Diagnosis: Chronic pancreatitis (H) [K86.1]   Procedure(s):  ENDOSCOPIC RETROGRADE CHOLANGIOPANCREATOGRAPHY     LABS:  CBC:   Lab Results   Component Value Date    WBC 5.8 2020    WBC 5.3 2020    HGB 12.5 2020    HGB 10.4 (L) 2020    HCT 39.1 2020    HCT 33.0 (L) 2020     2020     2020     BMP:   Lab Results   Component Value Date     2020     2020    POTASSIUM 3.3 (L) 2020    POTASSIUM 3.5 2020    CHLORIDE 110 (H) 2020    CHLORIDE 108 2020    CO2 27 2020    CO2 29 2020    BUN 8 2020    BUN 8 2020    CR 0.55 2020    CR 0.61 2020     (H) 2020    GLC 77 2020     COAGS:   Lab Results   Component Value Date    INR 1.28 (H) 2020     POC:   Lab Results   Component Value Date     (H) 2020     OTHER:   Lab Results   Component Value Date    PASCUAL 10.0 2020    ALBUMIN 2.9 (L) 2020    PROTTOTAL 6.2 (L) 2020    ALT 18 2020    AST 22 2020    ALKPHOS 191 (H) 2020    BILITOTAL 0.4 2020    LIPASE 269 2020    AMYLASE 67 2020        Preop Vitals    BP Readings from Last 3 Encounters:   20 (!) 148/76   20 116/69   20 (!) 141/72    Pulse Readings from Last 3 Encounters:   20 81   20 71   20 68      Resp Readings from Last 3 Encounters:   20 16   20 16   20 16    SpO2 Readings from Last 3 Encounters:   20 97%   20 96%   20 98%      Temp Readings from Last 1 Encounters:   20 36.6  C (97.8  F) (Oral)    Ht Readings from Last 1 Encounters:   20 1.626 m (5' 4\")      Wt Readings from Last 1 Encounters:   20 68.1 kg (150 lb 2.1 oz)    Estimated body mass index " "is 25.77 kg/m  as calculated from the following:    Height as of 4/23/20: 1.626 m (5' 4\").    Weight as of 4/23/20: 68.1 kg (150 lb 2.1 oz).     LDA:        Past Medical History:   Diagnosis Date     Antiplatelet or antithrombotic long-term use      History of ERCP     last stent placement via EUS/ERCP April 2020     HTN (hypertension)      Pancreatitis, chronic (H)      Portal vein thrombosis       Past Surgical History:   Procedure Laterality Date     ENDOSCOPIC RETROGRADE CHOLANGIOPANCREATOGRAM N/A 4/23/2020    Procedure: ENDOSCOPIC RETROGRADE CHOLANGIOPANCREATOGRAPHY with Stent Exchange, Pancreatic Stent Placement, Biliary Duct Stent Placement, Pancreatic and Biliary Sphincterotomy, Pancreatic Sludge Removal;  Surgeon: Nate Morton MD;  [facility]     ENDOSCOPIC RETROGRADE CHOLANGIOPANCREATOGRAM N/A 6/22/2020    Procedure: ENDOSCOPIC RETROGRADE CHOLANGIOPANCREATOGRAPHY WITH BILIARY SPHINCTEROTOMY AND STENT PLACEMENT, MINOR PAPILLOTOMY WITH STENT PLACEMENT AND DEBRIS REMOVAL AND VENTRAL PANCREATIC DUCT SPHINCTEROTOMY WITH STENT PLACEMENT;  Surgeon: Nate Morton MD;  [facility]     ENDOSCOPIC ULTRASOUND UPPER GASTROINTESTINAL TRACT (GI) N/A 4/23/2020    Procedure: Diagnostic ENDOSCOPIC ULTRASOUND, ESOPHAGOSCOPY / UPPER GASTROINTESTINAL TRACT (GI) with Stent Removal;  Surgeon: Braden Ruiz MD;  [facility]     ESOPHAGOSCOPY, GASTROSCOPY, DUODENOSCOPY (EGD), COMBINED N/A 6/4/2020    Procedure: ESOPHAGOGASTRODUODENOSCOPY (EGD);  Surgeon: Nate Morton MD;  Location:  GI     ESOPHAGOSCOPY, GASTROSCOPY, DUODENOSCOPY (EGD), COMBINED N/A 6/4/2020    Procedure: Esophagogastroduodenoscopy, With Foreign Body Removal;  Surgeon: Nate Morton MD;  Location:  GI     ORTHOPEDIC SURGERY  2017    lumbar     ORTHOPEDIC SURGERY  2003    cervical      Allergies   Allergen Reactions     Penicillins Rash     Erythromycin Diarrhea, GI Disturbance and Nausea     GI " upset.       Oxycodone Other (See Comments)     Other reaction(s): Constipation  severe constipation  severe constipation  Constipation       Nickel Rash        Anesthesia Evaluation     . Pt has had prior anesthetic. Type: General           ROS/MED HX    ENT/Pulmonary:  - neg pulmonary ROS     Neurologic:  - neg neurologic ROS     Cardiovascular:         METS/Exercise Tolerance:     Hematologic:     (+) History of blood clots pt is anticoagulated, -      Musculoskeletal:  - neg musculoskeletal ROS       GI/Hepatic: Comment: Chronic pancreatitis  History of portal vein thrombosis        Renal/Genitourinary:  - ROS Renal section negative       Endo:  - neg endo ROS       Psychiatric:  - neg psychiatric ROS       Infectious Disease:  - neg infectious disease ROS       Malignancy:      - no malignancy   Other:                         PHYSICAL EXAM:   Mental Status/Neuro: A/A/O   Airway: Facies: Feasible  Mallampati: I  Mouth/Opening: Full  TM distance: > 6 cm  Neck ROM: Full   Respiratory: Auscultation: CTAB     Resp. Rate: Normal     Resp. Effort: Normal      CV: Rhythm: Regular  Rate: Age appropriate  Heart: Normal Sounds  Edema: None   Comments:      Dental: Normal Dentition                Assessment:   ASA SCORE: 2    H&P: History and physical reviewed and following examination; no interval change.   Smoking Status:  Non-Smoker/Unknown   NPO Status: NPO Appropriate     Plan:   Anes. Type:  General   Pre-Medication: None   Induction:  IV (Standard)   Airway: ETT; Oral   Access/Monitoring: PIV   Maintenance: Balanced     Postop Plan:   Postop Pain: Opioids  Postop Sedation/Airway: Not planned     PONV Management:   Adult Risk Factors: Female, Non-Smoker, Postop Opioids   Prevention: Ondansetron     CONSENT: Direct conversation   Plan and risks discussed with: Patient   Blood Products: Consent Deferred (Minimal Blood Loss)       Comments for Plan/Consent:  COVID Test 7/19/2020 Negative                 Balaji Bright  DO

## 2020-07-21 NOTE — ANESTHESIA CARE TRANSFER NOTE
Patient: Amanda Sandoval    Procedure(s):  ENDOSCOPIC RETROGRADE CHOLANGIOPANCREATOGRAPHY with pancreatic stent exchange    Diagnosis: Chronic pancreatitis (H) [K86.1]  Diagnosis Additional Information: No value filed.    Anesthesia Type:   General     Note:  Airway :Nasal Cannula  Patient transferred to:PACU  Comments: Patient transferred to PACU. VSS. Denies pain, denies nausea. Report given to receiving RN.Handoff Report: Identifed the Patient, Identified the Reponsible Provider, Reviewed the pertinent medical history, Discussed the surgical course, Reviewed Intra-OP anesthesia mangement and issues during anesthesia, Set expectations for post-procedure period and Allowed opportunity for questions and acknowledgement of understanding      Vitals: (Last set prior to Anesthesia Care Transfer)    CRNA VITALS  7/21/2020 1036 - 7/21/2020 1111      7/21/2020             Pulse:  90    SpO2:  100 %    Resp Rate (observed): 12                Electronically Signed By: ANABELL Abrams CRNA  July 21, 2020  11:11 AM

## 2020-07-21 NOTE — DISCHARGE INSTRUCTIONS
RESTART HEIDIYVETTE TONIGHT 2020    [facility], [place]  Same-Day Surgery   Adult Discharge Orders & Instructions     For 24 hours after surgery        1. Get plenty of rest.  A responsible adult must stay with you for at least 24 hours after you leave the hospital.   2. Do not drive or use heavy equipment.  If you have weakness or tingling, don't drive or use heavy equipment until this feeling goes away.  3. Do not drink alcohol.  4. Avoid strenuous or risky activities.  Ask for help when climbing stairs.   5. You may feel lightheaded.  IF so, sit for a few minutes before standing.  Have someone help you get up.   6. If you have nausea (feel sick to your stomach): Drink only clear liquids such as apple juice, ginger ale, broth or 7-Up.  Rest may also help.  Be sure to drink enough fluids.  Move to a regular diet as you feel able.  7. You may have a slight fever. Call the doctor if your fever is over 100 F (37.7 C) (taken under the tongue) or lasts longer than 24 hours.  8. You may have a dry mouth, a sore throat, muscle aches or trouble sleeping.  These should go away after 24 hours.  9. Do not make important or legal decisions.   Call your doctor for any of the followin.  Signs of infection (fever, growing tenderness at the surgery site, a large amount of drainage or bleeding, severe pain, foul-smelling drainage, redness, swelling).    2. It has been over 8 to 10 hours since surgery and you are still not able to urinate (pass water).    3.  Headache for over 24 hours.    4.  Numbness, tingling or weakness the day after surgery (if you had spinal anesthesia).  To contact a doctor, call Dr Morton's office at [phone]  _ or:        [phone] and ask for the resident on call for   Gastroenterology  (answered 24 hours a day)      Emergency Department:    [facility]: [phone]       (TTY for hearing impaired: [phone])

## 2020-07-21 NOTE — OR NURSING
Dr. Bright notified patient ready for Phase II.  Dr. Bright stated he will enter anesthesia sign out.

## 2020-07-21 NOTE — OR NURSING
Dr. Todd Dimas at bedside in PACU discussed surgery outcome and plan of care with patient.      Per Dr. Dimas, patient to restart Xarelto tonight.  Patient informed of plan of care.    Per Dr. Dimas, patient does not need to be seen by Dr. Morton prior to discharge today.

## 2020-07-21 NOTE — PROGRESS NOTES
"RECOVERY RN NOTE  Assigned as pt nurse   Telephone report from Maria E RN @ 1203  NST assisting pt with transferring from cart to chair, with dressing and obtaining vs   All belongings returned to pt at this time  No report of any items missing   Pt mask returned and use encouraged   @ 1218 Dr. Dimas txt paged the following:  \"Amanda Sandoval is in 3C bay 3 available to be seen when you have time prior to discharging home. Thank you. Alba RN *[id number]\"  Dr. Dimas calls @ 1220 and informs me that he has already seen pt in PACU and that pt is good to go   Pt sitting up in chair alert and oriented tolerating ice chips without difficulty. Pt denies pain or nausea. Lung sounds are clear and equal and heart sounds normal s1s2 noted.   Call light within reach and pt verbalizes understanding of use   Written instructions to be sent with pt upon discharge   Discharge instructions reviewed verbally with pt and over the phone with  Zohaib. Education provided. Questions encouraged and answered. Over the phone consent provided @ 1235 (see written form for details).   Zohaib asked to come pick pt up from [facility] @ this time. States it will take 15-20 mins to arrive to hospital depending on traffic.   Pt transport placed   PIV removed   Plan for pt to be taken down to [facility] via wheelchair by pt transport.   Zohaib has arrive to hospital and called to notify @ 1300  Pt transport still pending inform Zohaib that the estimated time is 1316.   Pt transport pending @ this time   NST take pt down to [facility]   "

## 2020-07-21 NOTE — ANESTHESIA POSTPROCEDURE EVALUATION
Anesthesia POST Procedure Evaluation    Patient: Amanda Sandoval   MRN:     [id number] Gender:   female   Age:    70 year old :      1950        Preoperative Diagnosis: Chronic pancreatitis (H) [K86.1]   Procedure(s):  ENDOSCOPIC RETROGRADE CHOLANGIOPANCREATOGRAPHY with pancreatic stent exchange   Postop Comments: No value filed.     Anesthesia Type: General          Postop Pain Control: Uneventful            Sign Out: Well controlled pain   PONV: No   Neuro/Psych: Uneventful            Sign Out: Acceptable/Baseline neuro status   Airway/Respiratory: Uneventful            Sign Out: Acceptable/Baseline resp. status   CV/Hemodynamics: Uneventful            Sign Out: Acceptable CV status   Other NRE: NONE   DID A NON-ROUTINE EVENT OCCUR? No         Last Anesthesia Record Vitals:  CRNA VITALS  2020 1036 - 2020 1136      2020             Pulse:  90    SpO2:  100 %    Resp Rate (observed):  (!) 2          Last PACU Vitals:  Vitals Value Taken Time   /74 2020 11:40 AM   Temp 36.4  C (97.6  F) 2020 11:40 AM   Pulse 51 2020 11:40 AM   Resp 14 2020 11:30 AM   SpO2 99 % 2020 11:44 AM   Temp src     NIBP     Pulse     SpO2     Resp     Temp     Ht Rate     Temp 2     Vitals shown include unvalidated device data.      Electronically Signed By: Balaji Bright DO, 2020, 11:44 AM

## 2020-07-27 NOTE — TELEPHONE ENCOUNTER
Post ERCP (7/21/2020) with Dr. Morton: Follow-up    Post procedure recommendations: Confirm spontaneous stent passage by performing a KUB                        x-ray in 4 weeks.                        - Please refer patient to pain management clinic for                        persistent pancreatic-type pain despite maximum                        endoscopic therapy. Suspect that patient may require                        neuromodulator +/- nerve blocks.                        - Please start patient on weight-based pancrealipase                        CREON dietary supplementation to empirically treat                        possible malabsorptive symptoms.                        - Follow-up in Dr. Morton's clinic.     Orders placed: abd xr 4 weeks, pain referral, Creon ordrered    Patient states left message, mychart message sent    Clinic contact and scheduling numbers verified for future questions/concerns.    Maite Corona, RN Care Coordinator

## 2020-08-03 NOTE — TELEPHONE ENCOUNTER
[facility] Call Center    Phone Message    May a detailed message be left on voicemail: yes     Reason for Call: Order(s): Other:   Reason for requested: Xray  Date needed: ASAP  Provider name: Dr. Praveen Hussein from [facility] is requesting the Dx code for the Xray and wanting to make sure the order was for 2 views of abdomin. Please advise.       Action Taken: Message routed to:  [facility]    Travel Screening: Not Applicable

## 2020-08-04 NOTE — TELEPHONE ENCOUNTER
Called back, left message with staff that yes, is 2 view and codes are on the order.    Maite Corona RN Care Coordinator

## 2020-08-17 NOTE — PROGRESS NOTES
"Amanda Sandoval is a 70 year old female who is being evaluated via a billable video visit.      The patient has been notified of following:     \"This video visit will be conducted via a call between you and your physician/provider. We have found that certain health care needs can be provided without the need for an in-person physical exam.  This service lets us provide the care you need with a video conversation.  If a prescription is necessary we can send it directly to your pharmacy.  If lab work is needed we can place an order for that and you can then stop by our lab to have the test done at a later time.    Video visits are billed at different rates depending on your insurance coverage.  Please reach out to your insurance provider with any questions.    If during the course of the call the physician/provider feels a video visit is not appropriate, you will not be charged for this service.\"    Patient has given verbal consent for Video visit? Yes  How would you like to obtain your AVS? MyChart  If you are dropped from the video visit, the video invite should be resent to: Send to e-mail at: [email or web address]  Will anyone else be joining your video visit? No      During this virtual visit the patient is located in [place], patient verifies this as the location during the entirety of this visit.     Video-Visit Details    Type of service:  Video Visit    Start: 08/17/2020 02:47 pm   Stop: 08/17/2020 03:12 pm    Originating Location (pt. Location): Home    Distant Location (provider location):  [email or web address]     Platform used for Video Visit: AmWell     25 mins more than 50% videocounseling. PRSS1 pt w first episode of acute pancreatitis in [place], this past winter. ERCP w minor papillotomy, large rigid 7F 12cm stent. Very uncomfortable, came here for serial ERCPs w completion of minor papillotomy, identification of ansa loop not pancreas divisum, biliary sphincterotomy, then temporary dorsal duct " stents. Two more episodes of pancreatitis. Last ERCP 7/2019, two dorsal duct stents removed, sludge cleared from dorsal duct, self ejecting stent placed. Last 3 weeks has done very well, no pain, eating well. Never filled Creon as far too expensive. Xray at [facility] shows stent passed.    IMP: 25 mins counseling, reviewing data. 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 Now post biliary sphincterotomy, minor papillotomy for dorsal dominant pancreatic drainage, some evidence of chronic pancreatitis. Clinically doing well and hopefully will not have recurrence soon or ever. Discussed that if she does, we might consider laparoscopic cholecystectomy. If she is in [place], suggest hospitalization for medical management of pancreatitis, but avoid further procedures there.     Follow-up in 6 months or sooner if symptomatic   Also - arrange consultation w me w son who is PRSS1  And Consultation w Dr Lillian Davila of our [facility] GI division for granddaughter who is 6, and has had one episodes of acute pancreatitis at [facility]. .     Nate Morton MD

## 2020-08-17 NOTE — NURSING NOTE
"Chief Complaint   Patient presents with     RECHECK     Virtual follow up       Vitals:    08/17/20 1346   Weight: 61.2 kg (135 lb)   Height: 1.626 m (5' 4\")       Body mass index is 23.17 kg/m .      ADRIANNE Good NREMT                      "

## 2020-08-17 NOTE — PATIENT INSTRUCTIONS
Follow up:    Dr. Morton has outlined the following steps after your recent clinic visit:      Clinically doing well and hopefully will not have recurrence soon or ever. Discussed that if she does, we might consider laparoscopic cholecystectomy. If she is in [place], suggest hospitalization for medical management of pancreatitis, but avoid further procedures there.      Follow-up in 6 months or sooner if symptomatic. We will reach out to schedule clinic follow up.     Also - arrange consultation w me w son who is PRSS1. Please have your son reach out to us as he's able to meet with Dr. Morton    Please call with any questions or concerns regarding your clinic visit today.    It is a pleasure being involved in your health care.    Contacts post-consultation depending on your need:    Schedule Clinic Appointments            [phone] # 1   M-F 7:30 - 5 pm    Maite Corona RN Care Coordinator  [phone]     OR Procedure Scheduling                             [phone]    My Chart is available 24 hours a day and is a secure way to access your records and communicate with your care team.  I strongly recommend signing up if you haven't already done so, if you are comfortable with computers.  If you would like to inquire about this or are having problems with My Chart access, you may call [phone] or go online at [email or web address].  Please allow at least 24 hours for a response and extra time on weekends and Holidays.

## 2020-08-17 NOTE — LETTER
"    8/17/2020         RE: Amanda Sandoval  [address] [place] [facility] [address]        Dear Colleague,    Thank you for referring your patient, Amanda Sandoval, to the [employer] [facility]. Please see a copy of my visit note below.    Amanda Sandoval is a 70 year old female who is being evaluated via a billable video visit.      The patient has been notified of following:     \"This video visit will be conducted via a call between you and your physician/provider. We have found that certain health care needs can be provided without the need for an in-person physical exam.  This service lets us provide the care you need with a video conversation.  If a prescription is necessary we can send it directly to your pharmacy.  If lab work is needed we can place an order for that and you can then stop by our lab to have the test done at a later time.    Video visits are billed at different rates depending on your insurance coverage.  Please reach out to your insurance provider with any questions.    If during the course of the call the physician/provider feels a video visit is not appropriate, you will not be charged for this service.\"    Patient has given verbal consent for Video visit? Yes  How would you like to obtain your AVS? MyChart  If you are dropped from the video visit, the video invite should be resent to: Send to e-mail at: [email or web address]  Will anyone else be joining your video visit? No      During this virtual visit the patient is located in [place], patient verifies this as the location during the entirety of this visit.     Video-Visit Details    Type of service:  Video Visit    Start: 08/17/2020 02:47 pm   Stop: 08/17/2020 03:12 pm    Originating Location (pt. Location): Home    Distant Location (provider location):  [employer] [facility]     Platform used for Video Visit: AmWell     25 mins more than 50% videocounseling. PRSS1 pt w first episode of acute pancreatitis in [place], this " past winter. ERCP w minor papillotomy, large rigid 7F 12cm stent. Very uncomfortable, came here for serial ERCPs w completion of minor papillotomy, identification of ansa loop not pancreas divisum, biliary sphincterotomy, then temporary dorsal duct stents. Two more episodes of pancreatitis. Last ERCP 7/2019, two dorsal duct stents removed, sludge cleared from dorsal duct, self ejecting stent placed. Last 3 weeks has done very well, no pain, eating well. Never filled Creon as far too expensive. Xray at [facility] shows stent passed.    IMP: 25 mins counseling, reviewing data. 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 Now post biliary sphincterotomy, minor papillotomy for dorsal dominant pancreatic drainage, some evidence of chronic pancreatitis. Clinically doing well and hopefully will not have recurrence soon or ever. Discussed that if she does, we might consider laparoscopic cholecystectomy. If she is in [place], suggest hospitalization for medical management of pancreatitis, but avoid further procedures there.     Follow-up in 6 months or sooner if symptomatic   Also - arrange consultation w me w son who is PRSS1  And Consultation w Dr Lillian Davila of our [facility] GI division for granddaughter who is 6, and has had one episodes of acute pancreatitis at [facility]. .     Nate Morton MD          Again, thank you for allowing me to participate in the care of your patient.        Sincerely,        Nate Morton MD

## 2020-09-18 NOTE — TELEPHONE ENCOUNTER
Returned pt call to review Dr Morton's plan of care.   Pt currently being discharged for 3 day hospitalization for pancreatic flare.   Lipase 330 after 3 days was Lipase 18 at discharge.    Reviewed with pt, specifically  'Clinically doing well and hopefully will not have recurrence soon or ever. Discussed that if she does, we might consider laparoscopic cholecystectomy. If she is in [place], suggest hospitalization for medical management of pancreatitis, but avoid further procedures there.'    Pt will call with an update after a couple of weeks depending on if pain persists.

## 2020-09-18 NOTE — TELEPHONE ENCOUNTER
[facility] Call Center    Phone Message    May a detailed message be left on voicemail: yes     Reason for Call: Other: Patient called as [place]  The doctor there wanted her to check with Dr. Morton, to see if he wanted to see her, in regard to her pancreas pain.  Please follow up with patient.  Thank you!     Action Taken: Message routed to:  [facility]    Travel Screening: Not Applicable

## 2020-10-20 NOTE — TELEPHONE ENCOUNTER
Called to check on patient after her canceled clinic visit and ER visit yesterday.     Left message.    ML

## 2020-10-21 PROBLEM — K85.90 ACUTE ON CHRONIC PANCREATITIS (H): Status: ACTIVE | Noted: 2020-01-01

## 2020-10-21 PROBLEM — K86.1 ACUTE ON CHRONIC PANCREATITIS (H): Status: ACTIVE | Noted: 2020-01-01

## 2020-10-21 NOTE — ED TRIAGE NOTES
70 year old female with history of pancreatitis, presents with acute on chronic pain since Monday.  Patient was seen at OSH and released from ED.  Patient is taking oxycodone at home with some relief.

## 2020-10-21 NOTE — H&P
[facility]     History and Physical - [facility] 2 Service        Date of Admission:  10/21/2020    Assessment & Plan   Amanda Sandoval is a 70 year old female with a past medical history significant for chronic hereditary pancreatitis s/p ERCP x3, portal vein thrombosis, and HTN who presents with a 3 day history LUQ abdominal pain with radiation to the back with an elevated lipase of 1337 consistent with acute on chronic pancreatitis. Patient is in severe pain and receiving IV pain medications and fluids.     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  Initial episode of acute pancreatitis in February 2020 with multiple (11-12) recurrent episodes thereafter.  Classic abdominal pain consistent with prior episodes, lipase elevated at 1337.  No significant lab abnormalities otherwise with creatinine at baseline (0.66 on admission), no electrolyte abnormalities, LFTs within normal limits.  Will manage conservatively with IV fluid resuscitation, pain control, symptomatic management with antiemetics.  Previously discussions regarding potential cholecystectomy given presence of biliary sludge potentially suggestive of biliary origin despite PRSS1 carrier state.  Will plan to discuss with panc/bili service on 10/22. Followed by Dr. Morton.  Notably, has been unable to fill Creon.  This was documented in multiple outpatient encounters.  Will restart Creon while inpatient and discuss potential for prior authorization with GI service versus additional options for support if available.  -  mL/hr continuous  - Pain plan:   - PCA pump overnight: 0.1 mg loading dilaudid dose, 0.1 mg dilaudid every 10 min, max dose per hour of 0.6 mg dilaudid.  We will plan to increase if tolerated from a respiratory standpoint if pain not well controlled  with return to nurse driven dosing if pain inadequately controlled after modest adjustment.   -APAP 975 mg every 8 hour, lidocaine patch as needed, heat/ice as needed  - Hold PTA Creon while n.p.o., plan to restart when taking p.o.  - Symptomatic management: Zofran, Compazine, Reglan available as needed  - Will plan to consult GI in a.m. on 10/22  - Strict I/Os  - Some prior discussion of referral to pain service for nerve block, not yet addressed with patient.  Will plan to readdress in a.m. 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    - Continue PTA 20 mg oral Xarelto. Continuation pending GI consult tomorrow.    Hx HTN  - continue PTA metoprolol      Diet: NPO for Medical/Clinical Reasons Except for: No Exceptions, Ice Chips    Fluids: 125 mL/hr NS  DVT Prophylaxis: 20 mg PO Xarelto  Delgado Catheter: not present  Code Status: Needs to be collected.          Disposition Plan   Expected discharge: 2 - 3 days, recommended to prior living arrangement once adequate pain management/ tolerating PO medications.  Entered: John Page 10/21/2020, 7:57 PM       The patient's care was discussed with the Attending Physician, Dr. Smith and Primary team.    John Page  Medical Student  [address]   Contact information available via [facility] Paging/Directory  Please see sign in/sign out for up to date coverage information    Resident/Fellow Attestation   I, Jeanne Grullon, was present with the medical student who participated in the service and in the documentation of the note.  I have verified the history and personally performed the physical exam and medical decision making.  I agree with the assessment and plan of care as documented in the note.      Jeanne Grullon MD  [id number]  Date of Service (when I saw the patient):  10/21/20  ______________________________________________________________________    Chief Complaint   LUQ abdominal pain that radiates to the back    History is obtained from the patient    History of Present Illness   Amanda Sandoval is a 70 year old female with a past medical history of hereditary chronic pancreatitis, portal venous thrombosis, ERCP (with pancreatic stent placement and removal, and HTN who presents with the complaint of LUQ abdominal pain that radiates to the back. She had her first episode of pancreatitis in February of this year while in [place]. Her family has a long history pancreatitis with her father being diagnosed with pancreatitis and having recurrent attacks throughout his life. A doctor in [place] studied his family back in 1991 and diagnosed this being a hereditary pancreatitis. At a later date they performed genetic testing to confirm this within many of the family members. The patient reports that Dr. Morton follows approximately 25 family members who have this hereditary chronic pancreatitis.     Prior to February of this year she was previously healthy only with a history of hypertension. She had her first episode of acute pancreatitis in February. Since that time she had had approximately 11-12 episodes of acute pancreatitis. Each of these episodes typically last about 3 days and consists of extreme pain in the LUQ that radiates to her back. She does not return to a baseline of no pain between episodes. She complains of a constant dull ache between episodes. She is unaware of any triggers of her episodes and the only thing that helps is adequate hydration and pain management. She was found to have a portal vein thrombosis in April most likely due to the high inflammatory state from pancreatitis and was placed on a daily dose of Xarelto for anticoagulation.     She had an acute episode of pain similar to previous episodes on Monday, however, this time she reports that she  vomited approximately 12 times. She had never previously vomited before during an acute pancreatitis episode. She went to [facility] ED and received pain medication and IV fluids. She was told there were no beds available at the hospital or here at the [facility] and was sent home after being stabilized with a prescription of oxycodone for pain management. She was doing well most of the day Tuesday until night time her pain intensified and became unbearable. She then presented to the [place] ED this morning seeking help with pain management and stabilization of her condition. ROS positive for 30 pound weight loss since February, 12 episodes of vomiting Monday night, SOB due to pain with inspiration.    Review of Systems    The 10 point Review of Systems is negative other than noted in the HPI or here.     Past Medical History    I have reviewed this patient's medical history and updated it with pertinent information if needed.   Past Medical History:   Diagnosis Date     Antiplatelet or antithrombotic long-term use      History of ERCP     last stent placement via EUS/ERCP April 2020     HTN (hypertension)      Pancreatitis, chronic (H)      Portal vein thrombosis        Past Surgical History   I have reviewed this patient's surgical history and updated it with pertinent information if needed.  Past Surgical History:   Procedure Laterality Date     ENDOSCOPIC RETROGRADE CHOLANGIOPANCREATOGRAM N/A 4/23/2020    Procedure: ENDOSCOPIC RETROGRADE CHOLANGIOPANCREATOGRAPHY with Stent Exchange, Pancreatic Stent Placement, Biliary Duct Stent Placement, Pancreatic and Biliary Sphincterotomy, Pancreatic Sludge Removal;  Surgeon: Nate Morton MD;  Location: [facility] OR     ENDOSCOPIC RETROGRADE CHOLANGIOPANCREATOGRAM N/A 6/22/2020    Procedure: ENDOSCOPIC RETROGRADE CHOLANGIOPANCREATOGRAPHY WITH BILIARY SPHINCTEROTOMY AND STENT PLACEMENT, MINOR PAPILLOTOMY WITH STENT PLACEMENT AND DEBRIS REMOVAL AND VENTRAL  PANCREATIC DUCT SPHINCTEROTOMY WITH STENT PLACEMENT;  Surgeon: Nate Morton MD;  Location: [facility] OR     ENDOSCOPIC RETROGRADE CHOLANGIOPANCREATOGRAPHY, EXCHANGE TUBE/STENT N/A 7/21/2020    Procedure: ENDOSCOPIC RETROGRADE CHOLANGIOPANCREATOGRAPHY with pancreatic stent exchange;  Surgeon: Nate Morton MD;  Location: [facility] OR     ENDOSCOPIC ULTRASOUND UPPER GASTROINTESTINAL TRACT (GI) N/A 4/23/2020    Procedure: Diagnostic ENDOSCOPIC ULTRASOUND, ESOPHAGOSCOPY / UPPER GASTROINTESTINAL TRACT (GI) with Stent Removal;  Surgeon: Braden Ruiz MD;  Location: [facility] OR     ESOPHAGOSCOPY, GASTROSCOPY, DUODENOSCOPY (EGD), COMBINED N/A 6/4/2020    Procedure: ESOPHAGOGASTRODUODENOSCOPY (EGD);  Surgeon: Nate Morton MD;  Location:  GI     ESOPHAGOSCOPY, GASTROSCOPY, DUODENOSCOPY (EGD), COMBINED N/A 6/4/2020    Procedure: Esophagogastroduodenoscopy, With Foreign Body Removal;  Surgeon: Nate Morton MD;  Location:  GI     ORTHOPEDIC SURGERY  2017    lumbar     ORTHOPEDIC SURGERY  2003    cervical        Social History   Patient lives in [place] with her  for the past 37 years. She has been retired for the past 14 years and previously worked    Family History   I have reviewed this patient's family history and updated it with pertinent information if needed.  Family History   Problem Relation Age of Onset     Pancreatitis Father    Many other family members with history of pancreatitis (brother, sister, niece, nephew)    Prior to Admission Medications   Prior to Admission Medications   Prescriptions Last Dose Informant Patient Reported? Taking?   METOPROLOL SUCCINATE PO 10/21/2020 at Unknown time Self Yes Yes   Sig: Take 25 mg by mouth daily    OXcarbazepine (TRILEPTAL) 150 MG tablet Unknown at Unknown time  Yes No   Sig: Take 150 mg by mouth as needed (Patient uses very infrequently (last 4 years ago) but keeps on hand for trigeminal neuralgia.)   acetaminophen (TYLENOL) 500 MG  tablet Unknown at Unknown time  Yes No   Sig: Take 500-1,000 mg by mouth every 6 hours as needed for mild pain    amylase-lipase-protease (CREON) 78240-90045 units CPEP per EC capsule   No Yes   Sig: Take 2-3 with meals / 1-2 with snacks, up to 15 per day.   diphenhydrAMINE (BENADRYL) 25 MG capsule 10/20/2020 at Unknown time  Yes Yes   Sig: Take 25 mg by mouth nightly as needed for sleep    lipase-protease-amylase (ZENPEP) 20921-71107 units CPEP   No Yes   Sig: Take 1-2 with snacks, 2-3  with meals, up to 15 per day.   omeprazole (PRILOSEC) 20 MG DR capsule 10/20/2020 at Unknown time Self Yes Yes   Sig: Take 20 mg by mouth daily    oxyCODONE IR (ROXICODONE) 10 MG tablet 10/21/2020 at Unknown time  Yes Yes   Sig: Take 5 mg by mouth every 4 hours as needed for severe pain   prochlorperazine (COMPAZINE) 5 MG tablet Unknown at Unknown time Self Yes No   Sig: Take 5-10 mg by mouth every 6 hours as needed for nausea or vomiting    rivaroxaban ANTICOAGULANT (XARELTO) 20 MG TABS tablet 10/20/2020 at Unknown time  Yes Yes   Sig: Take 20 mg by mouth daily (with dinner)   senna-docusate (SENOKOT-S/PERICOLACE) 8.6-50 MG tablet 10/20/2020 at Unknown time  Yes Yes   Sig: Take 1 tablet by mouth daily as needed for constipation    traMADol (ULTRAM) 50 MG tablet Past Week at Unknown time  Yes Yes   Sig: Take 50 mg by mouth every 6 hours as needed for moderate pain       Facility-Administered Medications: None     Allergies   Allergies   Allergen Reactions     Penicillins Rash     Erythromycin Diarrhea, GI Disturbance and Nausea     GI upset.       Oxycodone Other (See Comments)     Other reaction(s): Constipation  severe constipation  severe constipation  Constipation       Nickel Rash     Penicillin G Rash       Physical Exam   Vital Signs: Temp: 100.6  F (38.1  C) Temp src: Axillary BP: 127/64 Pulse: 80   Resp: 20 SpO2: 95 % O2 Device: None (Room air)    Weight: 137 lbs 14.4 oz    Constitutional: In acute distress, visibly  uncomfortable rocking back and forth in pain, occasionally dry heaving into vomit bag  ENT: Normocephalic, without obvious abnormality, atraumatic, sinuses nontender on palpation,   Respiratory: No increased work of breathing. Quick, shallow inspirations. CAB with no wheezes or crackles  Cardiovascular: Normal apical impulse, regular rate and rhythm, normal S1 and S2, no S3 or S4, and no murmur noted  GI: Extremely tender to palpation in LUQ. BS could not be assessed.  Skin: no bruising or bleeding, normal skin color, texture, turgor, no redness, warmth, or swelling and no jaundice  Musculoskeletal: There is no redness, warmth, or swelling of the joints.  Full range of motion noted. Tone is normal.  Neurologic: Awake, alert, oriented to name, place and time.  Cranial nerves II-XII are grossly intact.    Neuropsychiatric: General: restless, aggitated, fidgeting and poor eye contact    Data   Data reviewed today: I reviewed all medications, new labs and imaging results over the last 24 hours. I personally reviewed no images or EKG's today.    Recent Labs   Lab 10/21/20  1019   WBC 9.9   HGB 12.7   MCV 93   *      POTASSIUM 4.6   CHLORIDE 111*   CO2 27   BUN 13   CR 0.66   ANIONGAP 3   PASCUAL 10.0   GLC 88   ALBUMIN 3.8   PROTTOTAL 7.2   BILITOTAL 1.0   ALKPHOS 150   ALT 23   AST 32   LIPASE 1,337*     Most Recent 3 CBC's:  Recent Labs   Lab Test 10/21/20  1019 07/21/20  0854 06/23/20  0620   WBC 9.9 3.7* 5.8   HGB 12.7 12.2 12.5   MCV 93 92 91   * 136* 221     Most Recent 3 BMP's:  Recent Labs   Lab Test 10/21/20  1019 07/21/20  0854 06/23/20  0620    140 144   POTASSIUM 4.6 3.7 3.3*   CHLORIDE 111* 111* 110*   CO2 27 27 27   BUN 13 16 8   CR 0.66 0.69 0.55   ANIONGAP 3 1* 6   PASCUAL 10.0 9.8 10.0   GLC 88 91 132*     Most Recent 2 LFT's:  Recent Labs   Lab Test 10/21/20  1019 07/21/20  0854   AST 32 26   ALT 23 32   ALKPHOS 150 184*   BILITOTAL 1.0 0.5     Anticoagulation Dose History     Recent  Dosing and Labs Latest Ref Rng & Units 6/22/2020 7/21/2020    INR 0.86 - 1.14 1.28(H) 1.08          Most Recent 3 Creatinines:  Recent Labs   Lab Test 10/21/20  1019 07/21/20  0854 06/23/20  0620   CR 0.66 0.69 0.55     Most Recent 3 Hemoglobins:  Recent Labs   Lab Test 10/21/20  1019 07/21/20  0854 06/23/20  0620   HGB 12.7 12.2 12.5     Most Recent 6 glucoses:  Recent Labs   Lab Test 10/21/20  1019 07/21/20  0854 06/23/20  0620 06/22/20  0601 06/20/20 04/23/20  0715   GLC 88 91 132* 77 81 91     Most Recent Urinalysis:No lab results found.  Most Recent ESR & CRP:No lab results found.  Most Recent Anemia Panel:  Recent Labs   Lab Test 10/21/20  1019   WBC 9.9   HGB 12.7   HCT 41.1   MCV 93   *     No results found for this or any previous visit (from the past 24 hour(s)).

## 2020-10-21 NOTE — ED NOTES
[facility]    ED Nurse to Floor Handoff     Amanda Sandoval is a 70 year old female who speaks English and lives with a spouse,  in a home  They arrived in the ED by car from home    ED Chief Complaint: Abdominal Pain    ED Dx;   Final diagnoses:   Acute on chronic pancreatitis (H)         Needed?: No    Allergies:   Allergies   Allergen Reactions     Penicillins Rash     Erythromycin Diarrhea, GI Disturbance and Nausea     GI upset.       Oxycodone Other (See Comments)     Other reaction(s): Constipation  severe constipation  severe constipation  Constipation       Nickel Rash     Penicillin G Rash   .  Past Medical Hx:   Past Medical History:   Diagnosis Date     Antiplatelet or antithrombotic long-term use      History of ERCP     last stent placement via EUS/ERCP April 2020     HTN (hypertension)      Pancreatitis, chronic (H)      Portal vein thrombosis       Baseline Mental status: WDL  Current Mental Status changes: at basesline    Infection present or suspected this encounter: no  Sepsis suspected: No  Isolation type: No active isolations  Patient tested for COVID 19 prior to admission: YES     Activity level - Baseline/Home:  Independent  Activity Level - Current:   Independent    Bariatric equipment needed?: No    In the ED these meds were given:   Medications   0.9% sodium chloride BOLUS (0 mLs Intravenous Stopped 10/21/20 1300)     Followed by   sodium chloride 0.9% infusion (has no administration in time range)   ondansetron (ZOFRAN) injection 4 mg (4 mg Intravenous Given 10/21/20 1149)   HYDROmorphone (PF) (DILAUDID) injection 0.5 mg (0.5 mg Intravenous Given 10/21/20 1416)   HYDROmorphone (PF) (DILAUDID) injection 0.5 mg (0.5 mg Intravenous Given 10/21/20 1150)   HYDROmorphone (PF) (DILAUDID) injection 0.5 mg (0.5 mg Intravenous Given 10/21/20 1216)       Drips running?  No    Home pump  No    Current LDAs  Peripheral IV 10/21/20 Right Hand  "(Active)   Site Assessment WDL 10/21/20 1024   Line Status Saline locked 10/21/20 1024   Phlebitis Scale 0-->no symptoms 10/21/20 1024   Infiltration Scale 0 10/21/20 1024   Infiltration Site Treatment Method  None 10/21/20 1024   Number of days: 0       Labs results:   Labs Ordered and Resulted from Time of ED Arrival Up to the Time of Departure from the ED   CBC WITH PLATELETS DIFFERENTIAL - Abnormal; Notable for the following components:       Result Value    MCHC 30.9 (*)     Platelet Count 127 (*)     All other components within normal limits   COMPREHENSIVE METABOLIC PANEL - Abnormal; Notable for the following components:    Chloride 111 (*)     All other components within normal limits   LIPASE - Abnormal; Notable for the following components:    Lipase 1,337 (*)     All other components within normal limits   COVID-19 VIRUS (CORONAVIRUS) BY PCR   PERIPHERAL IV CATHETER       Imaging Studies: No results found for this or any previous visit (from the past 24 hour(s)).    Recent vital signs:   /72   Pulse 68   Temp 97.8  F (36.6  C)   Resp 22   Ht 1.626 m (5' 4\")   Wt 62.6 kg (137 lb 14.4 oz)   SpO2 98%   BMI 23.67 kg/m      [place] Coma Scale Score: 15 (10/21/20 1047)       Cardiac Rhythm: Normal Sinus  Pt needs tele? No  Skin/wound Issues: None    Code Status: Full Code    Pain control: good    Nausea control: good    Abnormal labs/tests/findings requiring intervention: Lipase    Family present during ED course? Yes,   Family Comments/Social Situation comments: n/a    Tasks needing completion: None    Yolie De La O, RN  [phone] [facility]      "

## 2020-10-21 NOTE — ED PROVIDER NOTES
History     Chief Complaint   Patient presents with     Abdominal Pain     HPI  Amanda Sandoval is a 70 year old female with a past medical history of hereditary chronic pancreatitis, hypertension, portal venous thrombosis, ERCP (with prior pancreatic stent placement and removal) who presents to the emergency department with a chief complaint of abdominal pain.  It is located in the left upper quadrant.  It is severe and radiates to her back.  Associated with nausea without vomiting.  No diarrhea, cough, chest pain, shortness of breath.  Consistent with acute exacerbations of pancreatitis she has had in the past.  Pain has been worse since Monday.   The patient was seen at another hospital ([facility]) on 10/19 and was discharged from the emergency department.  Her lipase was found to be mildly elevated (250.2) at their facility.  The patient was prescribed oxycodone for pain and advised to take senna and MiraLAX.  She was advised to start with a clear liquid diet and slowly advance this at home.  The patient is taking oxycodone at home for pain.  This has alleviated her pain somewhat, but has failed to resolve.  No associated fever.  The patient denies alcohol, drug, or tobacco use.    The patient has had multiple ERCPs and colonoscopies as well as a hysterectomy, oophorectomy, and tubal ligation.    The patient follows with Dr. Hale of gastroenterology here.    I have reviewed the Medications, Allergies, Past Medical and Surgical History, and Social History in the eigital system.    Past Medical History:   Diagnosis Date     Antiplatelet or antithrombotic long-term use      History of ERCP     last stent placement via EUS/ERCP April 2020     HTN (hypertension)      Pancreatitis, chronic (H)      Portal vein thrombosis      Past Surgical History:   Procedure Laterality Date     ENDOSCOPIC RETROGRADE CHOLANGIOPANCREATOGRAM N/A 4/23/2020    Procedure: ENDOSCOPIC RETROGRADE CHOLANGIOPANCREATOGRAPHY with Stent  Exchange, Pancreatic Stent Placement, Biliary Duct Stent Placement, Pancreatic and Biliary Sphincterotomy, Pancreatic Sludge Removal;  Surgeon: Nate Morton MD;  Location: [facility]     ENDOSCOPIC RETROGRADE CHOLANGIOPANCREATOGRAM N/A 6/22/2020    Procedure: ENDOSCOPIC RETROGRADE CHOLANGIOPANCREATOGRAPHY WITH BILIARY SPHINCTEROTOMY AND STENT PLACEMENT, MINOR PAPILLOTOMY WITH STENT PLACEMENT AND DEBRIS REMOVAL AND VENTRAL PANCREATIC DUCT SPHINCTEROTOMY WITH STENT PLACEMENT;  Surgeon: Nate Morton MD;  Location: [facility] OR     ENDOSCOPIC RETROGRADE CHOLANGIOPANCREATOGRAPHY, EXCHANGE TUBE/STENT N/A 7/21/2020    Procedure: ENDOSCOPIC RETROGRADE CHOLANGIOPANCREATOGRAPHY with pancreatic stent exchange;  Surgeon: Nate Morton MD;  Location: [facility]     ENDOSCOPIC ULTRASOUND UPPER GASTROINTESTINAL TRACT (GI) N/A 4/23/2020    Procedure: Diagnostic ENDOSCOPIC ULTRASOUND, ESOPHAGOSCOPY / UPPER GASTROINTESTINAL TRACT (GI) with Stent Removal;  Surgeon: Braden Ruiz MD;  Location: [facility] OR     ESOPHAGOSCOPY, GASTROSCOPY, DUODENOSCOPY (EGD), COMBINED N/A 6/4/2020    Procedure: ESOPHAGOGASTRODUODENOSCOPY (EGD);  Surgeon: Nate Morton MD;  Location:  GI     ESOPHAGOSCOPY, GASTROSCOPY, DUODENOSCOPY (EGD), COMBINED N/A 6/4/2020    Procedure: Esophagogastroduodenoscopy, With Foreign Body Removal;  Surgeon: Nate Morton MD;  Location:  GI     ORTHOPEDIC SURGERY  2017    lumbar     ORTHOPEDIC SURGERY  2003    cervical     No current facility-administered medications for this encounter.      Current Outpatient Medications   Medication     amylase-lipase-protease (CREON) 92776-83544 units CPEP per EC capsule     diphenhydrAMINE (BENADRYL) 25 MG capsule     lipase-protease-amylase (ZENPEP) 03675-64059 units CPEP     METOPROLOL SUCCINATE PO     omeprazole (PRILOSEC) 20 MG DR capsule     oxyCODONE IR (ROXICODONE) 10 MG tablet     rivaroxaban ANTICOAGULANT (XARELTO) 20 MG TABS tablet      senna-docusate (SENOKOT-S/PERICOLACE) 8.6-50 MG tablet     traMADol (ULTRAM) 50 MG tablet     acetaminophen (TYLENOL) 500 MG tablet     OXcarbazepine (TRILEPTAL) 150 MG tablet     prochlorperazine (COMPAZINE) 5 MG tablet     Allergies   Allergen Reactions     Penicillins Rash     Erythromycin Diarrhea, GI Disturbance and Nausea     GI upset.       Oxycodone Other (See Comments)     Other reaction(s): Constipation  severe constipation  severe constipation  Constipation       Nickel Rash     Penicillin G Rash     Past medical history, past surgical history, medications, and allergies were reviewed with the patient. Additional pertinent items: None    Social History     Socioeconomic History     Marital status:      Spouse name: Not on file     Number of children: Not on file     Years of education: Not on file     Highest education level: Not on file   Occupational History     Not on file   Social Needs     Financial resource strain: Not on file     Food insecurity     Worry: Not on file     Inability: Not on file     Transportation needs     Medical: Not on file     Non-medical: Not on file   Tobacco Use     Smoking status: Never Smoker     Smokeless tobacco: Never Used   Substance and Sexual Activity     Alcohol use: Not Currently     Comment: drank socially before, nothing in 2020     Drug use: Never     Sexual activity: Not on file   Lifestyle     Physical activity     Days per week: Not on file     Minutes per session: Not on file     Stress: Not on file   Relationships     Social connections     Talks on phone: Not on file     Gets together: Not on file     Attends [employer] service: Not on file     Active member of club or organization: Not on file     Attends meetings of clubs or organizations: Not on file     Relationship status: Not on file     Intimate partner violence     Fear of current or ex partner: Not on file     Emotionally abused: Not on file     Physically abused: Not on file     Forced  "sexual activity: Not on file   Other Topics Concern     Parent/sibling w/ CABG, MI or angioplasty before 65F 55M? Not Asked   Social History Narrative     Not on file     Social history was reviewed with the patient. Additional pertinent items: None    Review of Systems  General: No fevers or chills  Skin: No rash or diaphoresis  Eyes: No eye redness or discharge  Ears/Nose/Throat: No rhinorrhea or nasal congestion  Respiratory: No cough or SOB  Cardiovascular: No chest pain or palpitations  Gastrointestinal: See HPI  Genitourinary: No urinary frequency, hematuria, or dysuria  Musculoskeletal: No arthralgias or myalgias  Neurologic: No numbness or weakness  Hematologic/Lymphatic/Immunologic: No leg swelling, no easy bruising/bleeding  Endocrine: No polyuria/polydypsia    A complete review of systems was performed with pertinent positives and negatives noted in the HPI, and all other systems negative.    Physical Exam   BP: 124/62  Pulse: 86  Temp: 97.8  F (36.6  C)  Resp: 22  Height: 162.6 cm (5' 4\")  Weight: 62.6 kg (137 lb 14.4 oz)  SpO2: 99 %      General: Well nourished, well developed, patient appears to be in mild distress secondary to pain  HEENT: EOMI, anicteric. NCAT, MMM  Neck: no jugular venous distension, supple, nl ROM  Cardiac: Regular rate and rhythm. No murmurs, rubs, or gallops. Normal S1, S2.  Intact peripheral pulses  Pulm: CTAB, no stridor, wheezes, rales, rhonchi  Abd: Soft, TTP in epigastric region/LUQ without rebound or guarding, nondistended.  No masses palpated.    Skin: Warm and dry to the touch.  No rash  Extremities: No LE edema, no cyanosis, w/w/p  Neuro: A&Ox3, no gross focal deficits    ED Course        Procedures                           Labs Ordered and Resulted from Time of ED Arrival Up to the Time of Departure from the ED   CBC WITH PLATELETS DIFFERENTIAL - Abnormal; Notable for the following components:       Result Value    MCHC 30.9 (*)     Platelet Count 127 (*)     All other " components within normal limits   COMPREHENSIVE METABOLIC PANEL - Abnormal; Notable for the following components:    Chloride 111 (*)     All other components within normal limits   LIPASE - Abnormal; Notable for the following components:    Lipase 1,337 (*)     All other components within normal limits   PERIPHERAL IV CATHETER            Results for orders placed or performed during the hospital encounter of 10/21/20 (from the past 24 hour(s))   CBC with platelets differential   Result Value Ref Range    WBC 9.9 4.0 - 11.0 10e9/L    RBC Count 4.44 3.8 - 5.2 10e12/L    Hemoglobin 12.7 11.7 - 15.7 g/dL    Hematocrit 41.1 35.0 - 47.0 %    MCV 93 78 - 100 fl    MCH 28.6 26.5 - 33.0 pg    MCHC 30.9 (L) 31.5 - 36.5 g/dL    RDW 14.1 10.0 - 15.0 %    Platelet Count 127 (L) 150 - 450 10e9/L    Diff Method Automated Method     % Neutrophils 79.3 %    % Lymphocytes 13.0 %    % Monocytes 6.2 %    % Eosinophils 0.8 %    % Basophils 0.3 %    % Immature Granulocytes 0.4 %    Nucleated RBCs 0 0 /100    Absolute Neutrophil 7.9 1.6 - 8.3 10e9/L    Absolute Lymphocytes 1.3 0.8 - 5.3 10e9/L    Absolute Monocytes 0.6 0.0 - 1.3 10e9/L    Absolute Eosinophils 0.1 0.0 - 0.7 10e9/L    Absolute Basophils 0.0 0.0 - 0.2 10e9/L    Abs Immature Granulocytes 0.0 0 - 0.4 10e9/L    Absolute Nucleated RBC 0.0    Comprehensive metabolic panel   Result Value Ref Range    Sodium 141 133 - 144 mmol/L    Potassium 4.6 3.4 - 5.3 mmol/L    Chloride 111 (H) 94 - 109 mmol/L    Carbon Dioxide 27 20 - 32 mmol/L    Anion Gap 3 3 - 14 mmol/L    Glucose 88 70 - 99 mg/dL    Urea Nitrogen 13 7 - 30 mg/dL    Creatinine 0.66 0.52 - 1.04 mg/dL    GFR Estimate 89 >60 mL/min/[1.73_m2]    GFR Estimate If Black >90 >60 mL/min/[1.73_m2]    Calcium 10.0 8.5 - 10.1 mg/dL    Bilirubin Total 1.0 0.2 - 1.3 mg/dL    Albumin 3.8 3.4 - 5.0 g/dL    Protein Total 7.2 6.8 - 8.8 g/dL    Alkaline Phosphatase 150 40 - 150 U/L    ALT 23 0 - 50 U/L    AST 32 0 - 45 U/L   Lipase   Result  Value Ref Range    Lipase 1,337 (H) 73 - 393 U/L       Labs, vital signs, and imaging studies were reviewed by me.    Medications   0.9% sodium chloride BOLUS (1,000 mLs Intravenous New Bag 10/21/20 1149)     Followed by   sodium chloride 0.9% infusion (has no administration in time range)   ondansetron (ZOFRAN) injection 4 mg (4 mg Intravenous Given 10/21/20 1149)   HYDROmorphone (PF) (DILAUDID) injection 0.5 mg (has no administration in time range)   HYDROmorphone (PF) (DILAUDID) injection 0.5 mg (0.5 mg Intravenous Given 10/21/20 1150)       Assessments & Plan (with Medical Decision Making)   Amanda Sandoval is a 70 year old female who presents to the emergency department with chief complaint of abdominal pain, consistent with prior pancreatitis flares.  Differential diagnosis would also include gastritis/gastroenteritis, cholecystitis/cholelithiasis, musculoskeletal abdominal wall pain, hepatitis.  Labs were ordered to further evaluate the patient.  IV fluids and medications were ordered to help alleviate the patient's symptoms in the emergency department.    Labs are remarkable for elevated lipase.    I have reviewed the nursing notes.    I have reviewed the findings, diagnosis, plan and need for follow up with the patient.    1230 Patient discussed with internal medicine, to be admitted to their service for further management. Plan was discussed with patient who understands and agrees with plan.     New Prescriptions    No medications on file       Final diagnoses:   Acute on chronic pancreatitis (H)       10/21/2020   [facility] EMERGENCY DEPARTMENT     Viridiana Dunn MD  10/21/20 1234

## 2020-10-22 NOTE — CONSULTS
GASTROENTEROLOGY CONSULTATION      Date of Admission:  10/21/2020           Reason for Consultation:   We were asked by Dr. Smith of emergency medicine to evaluate this patient with acute on chronic pancreatitis.         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      Recommendations  - CT abd/pelvis w/ con to eval for pancreatic duct strictures or complications of pancreatitis (necrosis, fluid collections, etc)  - Agree w/ fluids, pain control, and nausea control   - ADAT (patient not wanting to try clears yet)     GI will continue to follow.    Thank you for involving us in this patient's care. Please do not hesitate to contact the GI service with any questions or concerns.     Pt care plan discussed with Dr. Farnsworth, GI staff physician.    Carmen Dent M.D.   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     The patient follows w/ Dr. Morton and has had multiple ERCP's. She has received biliary sphincterotomy, extension minor papillotomy, and plastic stenting of the CBD, ventral PD, and dorsal PD. A PD stent was placed through the minor papilla in [place] because it was thought that she has pancreatic divisum which she does not. Her most recent ERCP was on 7/21 for dorsal duct stent exchange (a 3F 8cm Morton plastic stent was placed in the dorsal duct). At that time mild stenosis of the mid distal duct w/ upstream dilation of 4-6mm was found. She reports she had a KUB in  [place] ~4wks later which showed that the stent had passed.     The patient reports that 4 days ago she started experiencing LUQ pain which is typical for her pancreatitis. This worsened and the night prior to presentation she had extreme pain, nausea, and vomiting. She reports it has been 2 days since her last BM. She denied fevers or chills. This admission, Tmax has been 100.6 and she has not had tachycardia or hypotension. Labs showed a lipase of 1,300, nml LFT's, and no leukocytosis. BISAP 1.          Previous Endoscopy:   Procedure:           ERCP for dorsal duct stent exchange 7/21/20  Impression:          - No remaining major papiullla stents in place, but two                        minor papilla stents still in place, unmoved, removed                        - Prior biliary sphincterotomy open wiht normal bile duct                        - Prior minor papilla sphincterotomy appeared open.                        - Limited ventral pancreatogram re-demonstrated an ansa                        loop in the head of the pancreas, in continuity with the                        dorsal duct (i.e., NO evidence of pancreas divisum).                        - Dorsal pancreatogram showed mild-severity stenosis of                        the distal duct (i.e., closest to the minor papilla),                        with upstream dilation of the ventral PD to 4-6 mm.                        Dilated side-branches seen.                        - Normal saline irrigation of the dorsal duct (through                        the minor papilla); proteinaceous debris drained through                        the minor papilla.                        - One 3-Fr x 8-cm Morton single external pigtail                        plastic stent was deployed in dorsal duct, with majority                        of the stent located in the intestinal lumen to promote                        spontaneous stent passage.          Medications:     Medications  "Prior to Admission   Medication Sig Dispense Refill Last Dose     amylase-lipase-protease (CREON) 60336-48331 units CPEP per EC capsule Take 2-3 with meals / 1-2 with snacks, up to 15 per day. 450 capsule 6      diphenhydrAMINE (BENADRYL) 25 MG capsule Take 25 mg by mouth nightly as needed for sleep    10/20/2020 at Unknown time     lipase-protease-amylase (ZENPEP) 76878-05197 units CPEP Take 1-2 with snacks, 2-3  with meals, up to 15 per day. 450 capsule 5      METOPROLOL SUCCINATE PO Take 25 mg by mouth daily    10/21/2020 at Unknown time     omeprazole (PRILOSEC) 20 MG DR capsule Take 20 mg by mouth daily    10/20/2020 at Unknown time     oxyCODONE IR (ROXICODONE) 10 MG tablet Take 5 mg by mouth every 4 hours as needed for severe pain   10/21/2020 at Unknown time     rivaroxaban ANTICOAGULANT (XARELTO) 20 MG TABS tablet Take 20 mg by mouth daily (with dinner)   10/20/2020 at Unknown time     senna-docusate (SENOKOT-S/PERICOLACE) 8.6-50 MG tablet Take 1 tablet by mouth daily as needed for constipation    10/20/2020 at Unknown time     traMADol (ULTRAM) 50 MG tablet Take 50 mg by mouth every 6 hours as needed for moderate pain    Past Week at Unknown time     acetaminophen (TYLENOL) 500 MG tablet Take 500-1,000 mg by mouth every 6 hours as needed for mild pain    More than a month at Unknown time     OXcarbazepine (TRILEPTAL) 150 MG tablet Take 150 mg by mouth as needed (Patient uses very infrequently (last 4 years ago) but keeps on hand for trigeminal neuralgia.)   Unknown at Unknown time     prochlorperazine (COMPAZINE) 5 MG tablet Take 5-10 mg by mouth every 6 hours as needed for nausea or vomiting    Unknown at Unknown time             Physical Exam:   /60 (BP Location: Right arm)   Pulse 69   Temp 98  F (36.7  C) (Axillary)   Resp 16   Ht 1.626 m (5' 4\")   Wt 63.4 kg (139 lb 11.2 oz)   SpO2 100%   BMI 23.98 kg/m    Wt:   Wt Readings from Last 2 Encounters:   10/22/20 63.4 kg (139 lb 11.2 oz) "   08/17/20 61.2 kg (135 lb)      Constitutional: NAD, on RA  Eyes: conjunctiva anicteric  CV: No edema  Respiratory: Unlabored breathing  Abd: nondistended, TTP in epigastric and LUQ, no peritoneal signs  Skin: warm, perfused, no jaundice  Neuro: AAO x 3, fluent speech   Psych: Normal affect  MSK: No gross deformities            Past Medical History:   Reviewed and edited as appropriate  Past Medical History:   Diagnosis Date     Antiplatelet or antithrombotic long-term use      Arthritis      Cancer (H)      History of blood transfusion      History of ERCP     last stent placement via EUS/ERCP April 2020     HTN (hypertension)      Pancreatitis, chronic (H)      Portal vein thrombosis             Past Surgical History:   Reviewed and edited as appropriate   Past Surgical History:   Procedure Laterality Date     ABDOMEN SURGERY       BACK SURGERY       BIOPSY       COLONOSCOPY       ENDOSCOPIC RETROGRADE CHOLANGIOPANCREATOGRAM N/A 4/23/2020    Procedure: ENDOSCOPIC RETROGRADE CHOLANGIOPANCREATOGRAPHY with Stent Exchange, Pancreatic Stent Placement, Biliary Duct Stent Placement, Pancreatic and Biliary Sphincterotomy, Pancreatic Sludge Removal;  Surgeon: Nate Morton MD;  Location: [facility] OR     ENDOSCOPIC RETROGRADE CHOLANGIOPANCREATOGRAM N/A 6/22/2020    Procedure: ENDOSCOPIC RETROGRADE CHOLANGIOPANCREATOGRAPHY WITH BILIARY SPHINCTEROTOMY AND STENT PLACEMENT, MINOR PAPILLOTOMY WITH STENT PLACEMENT AND DEBRIS REMOVAL AND VENTRAL PANCREATIC DUCT SPHINCTEROTOMY WITH STENT PLACEMENT;  Surgeon: Nate Morton MD;  Location: [facility] OR     ENDOSCOPIC RETROGRADE CHOLANGIOPANCREATOGRAPHY, EXCHANGE TUBE/STENT N/A 7/21/2020    Procedure: ENDOSCOPIC RETROGRADE CHOLANGIOPANCREATOGRAPHY with pancreatic stent exchange;  Surgeon: Nate Morton MD;  Location: [facility]     ENDOSCOPIC ULTRASOUND UPPER GASTROINTESTINAL TRACT (GI) N/A 4/23/2020    Procedure: Diagnostic ENDOSCOPIC ULTRASOUND, ESOPHAGOSCOPY / UPPER  GASTROINTESTINAL TRACT (GI) with Stent Removal;  Surgeon: Braden Ruiz MD;  [facility]     ESOPHAGOSCOPY, GASTROSCOPY, DUODENOSCOPY (EGD), COMBINED N/A 6/4/2020    Procedure: ESOPHAGOGASTRODUODENOSCOPY (EGD);  Surgeon: Nate Morton MD;  Location:  GI     ESOPHAGOSCOPY, GASTROSCOPY, DUODENOSCOPY (EGD), COMBINED N/A 6/4/2020    Procedure: Esophagogastroduodenoscopy, With Foreign Body Removal;  Surgeon: Nate Morton MD;  [facility]     GYN SURGERY       HEAD & NECK SURGERY       ORTHOPEDIC SURGERY  2017    lumbar     ORTHOPEDIC SURGERY  2003    cervical            Social History:   Alcohol use: denied   Smoking history: denied  Living situation: independent          Family History:   Reviewed and edited as appropriate  Family History   Problem Relation Age of Onset     Pancreatitis Father      Diabetes Father      Osteoporosis Mother      Thyroid Disease Mother      Obesity Mother      Coronary Artery Disease Maternal Grandfather      Diabetes Paternal Grandmother      No known history of colorectal cancer, liver disease, or inflammatory bowel disease.         Allergies:   Reviewed and edited as appropriate     Allergies   Allergen Reactions     Penicillins Rash     Erythromycin Diarrhea, GI Disturbance and Nausea     GI upset.       Oxycodone Other (See Comments)     Other reaction(s): Constipation  severe constipation  severe constipation  Constipation       Nickel Rash     Penicillin G Rash              Review of Systems:     A complete 10 point review of systems was performed and is negative except as noted in the HPI

## 2020-10-22 NOTE — PROGRESS NOTES
Patient admitted to:5412 on 5C  Admitted from: ED  Arrived by: Car  Reason for admission: Pancreatitis  Patient accompanied by: Self  Belongings: with pt  Teaching: Room orientation, safety.   Skin double check completed by: Aiyana Llamas RN, Jeremy Ramsey RN

## 2020-10-22 NOTE — PLAN OF CARE
Pt afebrile, OVSS on RA. Pt reports consistent, sharp abdominal and back pain. Dilaudid PCA discontinued, now has PRN IV 0.2-0.4 mg dilaudid. 0.2 mg dilaudid x2 with good effect. Abdominal and pelvic CT completed. Diet changed from NPO to clear liquids- advance as tolerated. Fluids infusing at 150 ml/hr.  Pt is steady and alert, bed alarm discontinued, pt is up ind.   Problem: Adult Inpatient Plan of Care  Goal: Plan of Care Review  Outcome: No Change     Problem: Pain Acute  Goal: Acceptable Pain Control and Functional Ability  Outcome: No Change  Intervention: Develop Pain Management Plan  Recent Flowsheet Documentation  Taken 10/22/2020 1731 by Kristi Morales RN  Pain Management Interventions: medication (see MAR)  Taken 10/22/2020 1330 by Kristi Morales RN  Pain Management Interventions: medication (see MAR)  Taken 10/22/2020 0744 by Kristi Morales RN  Pain Management Interventions: pain pump in use  Intervention: Optimize Psychosocial Wellbeing  Recent Flowsheet Documentation  Taken 10/22/2020 0800 by Kristi Morales RN  Supportive Measures: active listening utilized

## 2020-10-22 NOTE — PLAN OF CARE
"/59 (BP Location: Right arm)   Pulse 70   Temp 98.1  F (36.7  C) (Oral)   Resp 18   Ht 1.626 m (5' 4\")   Wt 62.6 kg (137 lb 14.4 oz)   SpO2 99%   BMI 23.67 kg/m    AVSS. Dilaudid PCA started 0.1mg Q10min for abdominal pain r/t to pancreatitis. 9 attempted bumps, 6 bumps given. Pt denies N/V/D. Stand by assist. Bed alarm on. No stools during shift, pt is voiding and calling appropriately. Continue with plan of care.    Problem: Adult Inpatient Plan of Care  Goal: Plan of Care Review  Outcome: No Change  Flowsheets (Taken 10/22/2020 0410)  Plan of Care Reviewed With: patient  Progress: no change     Problem: Pain Acute  Goal: Acceptable Pain Control and Functional Ability  Outcome: No Change     Problem: Pain Acute  Goal: Acceptable Pain Control and Functional Ability  Intervention: Develop Pain Management Plan  Recent Flowsheet Documentation  Taken 10/22/2020 0325 by Aiyana Llamas RN  Pain Management Interventions: medication (see MAR)  Taken 10/21/2020 2222 by Aiyana Llamas RN  Pain Management Interventions: medication (see MAR)  Taken 10/21/2020 2000 by Aiyana Llamas RN  Pain Management Interventions: medication (see MAR)     Problem: Pain Acute  Goal: Acceptable Pain Control and Functional Ability  Intervention: Prevent or Manage Pain  Recent Flowsheet Documentation  Taken 10/21/2020 2010 by Aiyana Llamas RN  Sensory Stimulation Regulation:    auditory stimulation minimized    care clustered    lighting decreased    visual stimulation minimized  Sleep/Rest Enhancement:    awakenings minimized    regular sleep/rest pattern promoted    room darkened     Problem: Pain Acute  Goal: Acceptable Pain Control and Functional Ability  Intervention: Optimize Psychosocial Wellbeing  Recent Flowsheet Documentation  Taken 10/21/2020 2010 by Aiyana Llamas RN  Supportive Measures:    active listening utilized    verbalization of feelings encouraged     Problem: Adult Inpatient Plan of " Care  Goal: Absence of Hospital-Acquired Illness or Injury  Intervention: Identify and Manage Fall Risk  Recent Flowsheet Documentation  Taken 10/21/2020 2010 by Aiyana Llamas RN  Safety Promotion/Fall Prevention:    activity supervised    clutter free environment maintained    fall prevention program maintained    lighting adjusted    nonskid shoes/slippers when out of bed    supervised activity     Problem: Adult Inpatient Plan of Care  Goal: Absence of Hospital-Acquired Illness or Injury  Intervention: Prevent Skin Injury  Recent Flowsheet Documentation  Taken 10/21/2020 2010 by Aiyana Llamas RN  Body Position: position changed independently     Problem: Adult Inpatient Plan of Care  Goal: Absence of Hospital-Acquired Illness or Injury  Intervention: Prevent and Manage VTE (Venous Thromboembolism) Risk  Recent Flowsheet Documentation  Taken 10/21/2020 2010 by Aiyana Llamas RN  VTE Prevention/Management:    anticoagulant therapy maintained    bleeding risk assessed     Problem: Adult Inpatient Plan of Care  Goal: Absence of Hospital-Acquired Illness or Injury  Intervention: Prevent Infection  Recent Flowsheet Documentation  Taken 10/21/2020 2010 by Aiyana Llamas RN  Infection Prevention:    environmental surveillance performed    equipment surfaces disinfected    hand hygiene promoted    personal protective equipment utilized    rest/sleep promoted    single patient room provided    visitors restricted/screened

## 2020-10-22 NOTE — PROGRESS NOTES
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     Changes today:  - GI consulted, appreciate recommendations    - CT A/P with contrast ordered to evaluate for complications of acute pancreatitis  - Pain plan: transitioned from PCA pump to PRN IV dilaudid  - Advance diet as tolerated-- patient may begin clears when ready    Acute on chronic pancreatitis 2/2 PRSS1 gene positive  Acute on chronic pain  Admitted following failure of outpatient management of early acute on chronic pancreatitis.  Initial episode of acute pancreatitis in February 2020 with multiple (11-12) recurrent episodes thereafter.  Classic abdominal pain consistent with prior episodes, lipase elevated at 1337.  No significant lab abnormalities otherwise with creatinine at baseline (0.66 on admission), no electrolyte abnormalities, LFTs within normal limits.  Will manage conservatively with IV fluid resuscitation, pain control, symptomatic management with antiemetics.  Previously discussions regarding potential cholecystectomy given presence of biliary sludge potentially suggestive of biliary origin despite PRSS1 carrier state.  GI made no mention of pursing this at this time. Followed by Dr. Morton. Will restart creon when the patient is able to tolerate oral intake.   - Continue  mL/hr continuous-- likely to reduce rate on 10/23  - Pain plan:              - 0.2 - 0.4 mg IV dilaudid q2h PRN               - APAP 975 mg every 8 hour, lidocaine patch as needed, heat/ice as needed  - Symptomatic management: Zofran, Compazine, Reglan available as needed  - Strict I/Os  - Some prior discussion of referral to pain  service for nerve block, not yet addressed with patient.  Will plan to readdress when patient is closer to discharge.     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    - Continue PTA 20 mg oral Xarelto.   Hx HTN  - Continue PTA metoprolol     Diet: NPO for Medical/Clinical Reasons Except for: Ice Chips, Meds  Room Service    Fluids: 150 mL/hr LR  Lines: 1x PIV right hand  DVT Prophylaxis: Xarelto  Delgado Catheter: not present  Code Status:   Full code         Disposition Plan   Expected discharge: 2 - 3 days, recommended to prior living arrangement once adequate pain management/ tolerating PO medications.  Entered: John Page 10/22/2020, 7:46 AM       The patient's care was discussed with the Attending Physician, Dr. Smith and Patient.    John Page  Medical Student  [address]   Please see sign in/sign out for up to date coverage information    Resident/Fellow Attestation   I, Jeanne Grullon, was present with the medical student who participated in the service and in the documentation of the note.  I have verified the history and personally performed the physical exam and medical decision making.  I agree with the assessment and plan of care as documented in the note.      Jeanne Grullon MD  PGY3  Date of Service (when I saw the patient): 10/22/20  ______________________________________________________________________    Interval History       Data reviewed today: I reviewed all medications, new labs and imaging results over the last 24 hours. I personally reviewed the EKG tracing showing normal sinus rhythm .    Physical Exam   Vital Signs: Temp: 98.1  F (36.7  C) Temp src: Oral BP: 109/59 Pulse: 70   Resp: 18 SpO2: 99 % O2 Device: None (Room air)    Weight: 137 lbs 14.4 oz  Constitutional: awake, alert,  cooperative, uncomfortable and in moderate distress, appears stated age  Eyes: Lids and lashes normal, pupils equal, round and reactive to light, sclera clear, conjunctiva normal  ENT: Normocephalic, without obvious abnormality, atraumatic  Hematologic / Lymphatic: no cervical lymphadenopathy and no supraclavicular lymphadenopathy  Respiratory: No increased work of breathing. Quick, shallow inspirations. CAB with no wheezes or crackles  Cardiovascular: Normal apical impulse, regular rate and rhythm, normal S1 and S2, no S3 or S4, and no murmur noted  GI: No external abnormalities. Active BS. LUQ extremely tender to palpation.   Skin: no bruising or bleeding, normal skin color, texture, turgor and no redness, warmth, or swelling  Musculoskeletal: There is no redness, warmth, or swelling of the joints.  Full range of motion noted. Tone is normal.  Neurologic: Awake, alert, oriented to name, place and time.  Cranial nerves II-XII are grossly intact.   Neuropsychiatric: General: restless, aggitated and fidgeting    Data   Recent Labs   Lab 10/22/20  0547 10/21/20  1019   WBC 8.7 9.9   HGB 10.7* 12.7   MCV 92 93   PLT 97* 127*   INR 2.46*  --     141   POTASSIUM 4.0 4.6   CHLORIDE 112* 111*   CO2 24 27   BUN 12 13   CR 0.63 0.66   ANIONGAP 6 3   PASCUAL 9.2 10.0   GLC 82 88   ALBUMIN 2.8* 3.8   PROTTOTAL 5.7* 7.2   BILITOTAL 1.8* 1.0   ALKPHOS 108 150   ALT 14 23   AST 13 32   LIPASE  --  1,337*     No results found for this or any previous visit (from the past 24 hour(s)).  Medications     HYDROmorphone       lactated ringers 150 mL/hr at 10/22/20 0324       acetaminophen  975 mg Oral Q8H     lidocaine  1 patch Transdermal Q24h    And     lidocaine   Transdermal Q8H     metoprolol succinate ER  25 mg Oral Daily     rivaroxaban ANTICOAGULANT  20 mg Oral Daily with supper     sodium chloride (PF)  3 mL Intracatheter Q8H

## 2020-10-23 NOTE — PROGRESS NOTES
"    GASTROENTEROLOGY PROGRESS NOTE    Date: 10/23/2020     ASSESSMENT:  70 year old female with a h/o chronic pancreatitis related to PRSS1 mutation and portal vein thrombosis on rivaroxaban who is presenting w/ 4 days of LUQ pain and N/V, c/f acute on chronic pancreatitis; BISAP 1.     CT abd/pelvis c/w acute edematous pancreatitis on chronic pancreatitis w/ a peripancreatic fluid collection, chronically occluded portal veins, numerous portosystemic varices, and ascites. Cirrhosis and thrombocytopenia are new (patient had a CT showing steatosis in June).     RECOMMENDATIONS:  - We discussed the new finding of cirrhosis with her, she will need OP f/u which we will arrange   - Agree w/ fluids and nausea/pain control per primary team   - PD stenting in the OR on Monday, hold AC starting on Sunday; NPO Sunday evening     Gastroenterology will continue to follow.      Thank you for involving us in this patient's care. Please do not hesitate to contact the GI service with any questions or concerns.      Pt care plan discussed with Dr. Farnsworth, GI staff physician.    Carmen Dent M.D.   GI Fellow  [phone]  _______________________________________________________________    Subjective: NAEO; patient tolerating a liquid diet; reports abdominal soreness improved from yesterday, denied N/V     Objective:  Blood pressure 137/71, pulse 71, temperature 97.9  F (36.6  C), temperature source Axillary, resp. rate 16, height 1.626 m (5' 4\"), weight 63.5 kg (140 lb), SpO2 99 %.    Constitutional: NAD, on RA  Eyes: conjunctiva anicteric  CV: No edema  Respiratory: Unlabored breathing  Abd: nondistended, TTP in epigastric and LUQ, no peritoneal signs  Skin: warm, perfused, no jaundice  Neuro: AAO x 3, fluent speech   Psych: Normal affect   MSK: No gross deformities      LABS:  BMP  Recent Labs   Lab 10/23/20  0341 10/22/20  0547 10/21/20  1019    142 141   POTASSIUM 3.6 4.0 4.6   CHLORIDE 110* 112* 111*   PASCUAL 9.3 9.2 " 10.0   CO2 25 24 27   BUN 8 12 13   CR 0.57 0.63 0.66   GLC 77 82 88     CBC  Recent Labs   Lab 10/23/20  0341 10/22/20  0547 10/21/20  1019   WBC 5.6 8.7 9.9   RBC 3.14* 3.75* 4.44   HGB 9.0* 10.7* 12.7   HCT 28.9* 34.4* 41.1   MCV 92 92 93   MCH 28.7 28.5 28.6   MCHC 31.1* 31.1* 30.9*   RDW 13.8 14.1 14.1   PLT 91* 97* 127*     INR  Recent Labs   Lab 10/22/20  0547   INR 2.46*     LFTs  Recent Labs   Lab 10/23/20  0341 10/22/20  0547 10/21/20  1019   ALKPHOS 108 108 150   AST 13 13 32   ALT 18 14 23   BILITOTAL 1.4* 1.8* 1.0   PROTTOTAL 5.8* 5.7* 7.2   ALBUMIN 2.8* 2.8* 3.8      PANC  Recent Labs   Lab 10/21/20  1019   LIPASE 1,337*       IMAGING:  CT ABDOMEN WO & W & PELVIS WO CONTRAST   10/22/2020  IMPRESSION:  1. Acute interstitial edematous pancreatitis on chronic pancreatitis  associated with mild to moderate acute peripancreatic collection. No  evidence of drainable pocket of fluid collection or pseudoaneurysm  identified.  2. Hepatic cirrhosis with chronically occluded intrahepatic portal and  main portal veins associated with numerous portosystemic varices in  the papito hepatis and upper abdomen. Ascites.  3. Mesenteric edema and reactive bowel change secondary to acute  pancreatitis.

## 2020-10-23 NOTE — PLAN OF CARE
"/76 (BP Location: Right arm)   Pulse 80   Temp 97.4  F (36.3  C) (Oral)   Resp 16   Ht 1.626 m (5' 4\")   Wt 63.5 kg (140 lb)   SpO2 99%   BMI 24.03 kg/m    The two right PIV are patent and one is infusing. Patient c/o mild RUQ pain this morning and mild nausea without emesis. Patient received dilaudid iv x 1 and compazine 5 mg iv x 1 with relief. Since hold off the Zofran patient's headache is resolved. Patient wants the Lid patch at night. Patient is eating and drinking fair. Patient's  is with her and supportive. Continue with plan of care and notify  MD for status changes.    Problem: Adult Inpatient Plan of Care  Goal: Plan of Care Review  Outcome: No Change  Flowsheets (Taken 10/23/2020 1724)  Plan of Care Reviewed With:   patient   spouse   caregiver     Problem: Adult Inpatient Plan of Care  Goal: Patient-Specific Goal (Individualized)  Outcome: No Change     Problem: Adult Inpatient Plan of Care  Goal: Absence of Hospital-Acquired Illness or Injury  Outcome: No Change     Problem: Adult Inpatient Plan of Care  Goal: Absence of Hospital-Acquired Illness or Injury  Intervention: Identify and Manage Fall Risk  Recent Flowsheet Documentation  Taken 10/23/2020 0850 by Madonna Garcia RN  Safety Promotion/Fall Prevention:   activity supervised   fall prevention program maintained   nonskid shoes/slippers when out of bed     Problem: Adult Inpatient Plan of Care  Goal: Absence of Hospital-Acquired Illness or Injury  Intervention: Prevent Skin Injury  Recent Flowsheet Documentation  Taken 10/23/2020 0850 by Madonna Garcia RN  Body Position:   position maintained   legs elevated     Problem: Adult Inpatient Plan of Care  Goal: Absence of Hospital-Acquired Illness or Injury  Intervention: Prevent Infection  Recent Flowsheet Documentation  Taken 10/23/2020 0850 by Madonna Garcia RN  Infection Prevention:   cohorting utilized   environmental surveillance performed   "   Problem: Adult Inpatient Plan of Care  Goal: Readiness for Transition of Care  Outcome: No Change

## 2020-10-23 NOTE — PLAN OF CARE
Afebrile, VSS. PRN dilaudid x2 for abdominal pain. Pt complains of mild headache overnight. PRN Zofran x1 for nausea/ dry heaving, no emesis. Pt sleeping on and off throughout the night. Up independent and voiding without issue. Continue with plan of care.     Problem: Adult Inpatient Plan of Care  Goal: Plan of Care Review  Outcome: No Change     Problem: Adult Inpatient Plan of Care  Goal: Patient-Specific Goal (Individualized)  Outcome: No Change     Problem: Adult Inpatient Plan of Care  Goal: Absence of Hospital-Acquired Illness or Injury  Outcome: No Change     Problem: Adult Inpatient Plan of Care  Goal: Optimal Comfort and Wellbeing  Outcome: No Change

## 2020-10-23 NOTE — PROGRESS NOTES
[facility]     Progress Note - Marcarol 2 Service        Date of Admission:  10/21/2020    Assessment & Plan       Amanda Sandoval is a 70 year old female with a past medical history of chronic pancreatitis in setting of PRSS1 carrier state (complicated by portal vein thrombosis on DOAC) and HTN admitted with acute on chronic pancreatitis.     Changes today:  - Decreased fluids from 150 mL/hr to 100mL/hr  - Briefly NPO with diet advanced to CLD  - Pancreatic duct stenting anticipated Monday, 10/26     Acute on chronic pancreatitis 2/2 PRSS1 gene positive  Acute on chronic pain  Admitted following failure of outpatient management of early acute on chronic pancreatitis.  Initial episode of acute pancreatitis in February 2020 with multiple (11-12) recurrent episodes thereafter.  Classic abdominal pain consistent with prior episodes, lipase elevated at 1337.  No significant lab abnormalities otherwise with creatinine at baseline (0.66 on admission), no electrolyte abnormalities, LFTs within normal limits.  Will manage conservatively with IV fluid resuscitation, pain control, symptomatic management with antiemetics.  Previously discussions regarding potential cholecystectomy given presence of biliary sludge potentially suggestive of biliary origin despite PRSS1 carrier state.  GI made no mention of pursing this at this time. Followed by Dr. Morton. Will restart creon when the patient is able to tolerate oral intake.   - 100 mL/hr continuous mIVF  - Pain plan:              - 0.2 - 0.4 mg IV dilaudid q2h PRN               - APAP 975 mg every 8 hour, lidocaine patch as needed, heat/ice as needed  - Symptomatic management: Zofran, Compazine, Reglan available as needed  - Strict I/Os  - Patient amendable to meeting with chronic pain specialist to help minimize pain between episodes.     Hyperchloremia  Without hypernatremia or metabolic acidosis.  - BMP in AM    Anemia,  normocytic  Dilutional.  - CBC in AM    Cirrhosis  As demonstrated on CT AP on 10/13.   - Follow up with hepatology as outpatient    Thrombocytopenia  Mild thrombocytopenia to 120s-130s over past several months. Possibly 2/2 cirrhosis.     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    - Continue PTA 20 mg oral Xarelto.   Hx HTN  - Continue PTA metoprolol     Diet: Room Service  Advance Diet as Tolerated: Clear Liquid Diet    Fluids: 100 mL/hr LR  Lines: 1x PIV right hand  DVT Prophylaxis: Xarelto  Delgado Catheter: not present  Code Status:   Full code         Disposition Plan   Expected discharge: 1 - 2 days, recommended to prior living arrangement once adequate pain management/ tolerating PO medications.  Entered: John Page 10/23/2020, 7:09 AM       The patient's care was discussed with the Attending Physician, Dr. Smith and Patient.    John Page  Medical Student  [address]   Please see sign in/sign out for up to date coverage information    Resident/Fellow Attestation   I, Jeanne Grullon, was present with the medical student who participated in the service and in the documentation of the note.  I have verified the history and personally performed the physical exam and medical decision making.  I agree with the assessment and plan of care as documented in the note.      Jeanne Grullon MD  PGY3  Date of Service (when I saw the patient): 10/23/20      ______________________________________________________________________    Interval History   All nursing notes reviewed. Patient was laying in bed this morning with the lights off when I entered. She reports that she was able to get sporadic sleep last night which was better than the night before. She reports that her pain is at a 4/10 and is adequately controlled  by the PRN dilaudid. She has frequently been using the bathroom and has been able to do this on her own without assistance. She is able to ambulate comfortably. She had an icee and was able to hold it down but then after trying to drink some tea she vomited. She said she is going to try to gradually increase her food intake today.     Data reviewed today: I reviewed all medications, new labs and imaging results over the last 24 hours. I personally reviewed the EKG tracing showing normal sinus rhythm .    Physical Exam   Vital Signs: Temp: 98.2  F (36.8  C) Temp src: Axillary BP: 134/74 Pulse: 76   Resp: 18 SpO2: 96 % O2 Device: None (Room air)    Weight: 139 lbs 11.2 oz  Constitutional: awake, alert, cooperative, resting in bed with occasional discomfort.   Eyes: Lids and lashes normal, pupils equal, round and reactive to light, sclera clear, conjunctiva normal  ENT: Normocephalic, without obvious abnormality, atraumatic  Respiratory: No increased work of breathing. Breathing comfortably on room air. CAB with no wheezes or crackles  Cardiovascular: Normal apical impulse, regular rate and rhythm, normal S1 and S2, no S3 or S4, and no murmur noted  GI: No external abnormalities. Active BS. LUQ extremely tender to palpation. No rebound tenderness  Neurologic: Awake, alert, oriented to name, place and time.  Cranial nerves II-XII are grossly intact.     Data   Recent Labs   Lab 10/23/20  0341 10/22/20  0547 10/21/20  1019   WBC 5.6 8.7 9.9   HGB 9.0* 10.7* 12.7   MCV 92 92 93   PLT 91* 97* 127*   INR  --  2.46*  --     142 141   POTASSIUM 3.6 4.0 4.6   CHLORIDE 110* 112* 111*   CO2 25 24 27   BUN 8 12 13   CR 0.57 0.63 0.66   ANIONGAP 5 6 3   PASCUAL 9.3 9.2 10.0   GLC 77 82 88   ALBUMIN 2.8* 2.8* 3.8   PROTTOTAL 5.8* 5.7* 7.2   BILITOTAL 1.4* 1.8* 1.0   ALKPHOS 108 108 150   ALT 18 14 23   AST 13 13 32   LIPASE  --   --  1,337*     Recent Results (from the past 24 hour(s))   CT Abdomen wo & w & Pelvis w Contrast     Narrative    EXAMINATION: CT ABDOMEN WO & W & PELVIS WO CONTRAST   10/22/2020 4:17 PM      CLINICAL HISTORY: Abd pain, acute, generalized; Patient with acute  pancreatitis. Evaluation for potential complications and determination  if there is a stent present.    COMPARISON: 4/5/2020    PROCEDURE COMMENTS: CT of the abdomen was performed with iopamidol  (ISOVUE-370) solution 84 mL intravenous and oral contrast. Coronal and  sagittal reformatted images were obtained.    FINDINGS:    Severely atrophic pancreas with irregular dilatation of  intrapancreatic duct measuring up to 9 mm (series 7 image 146). There  is mild enhancement of the residual pancreatic tissue throughout the  gland associated with acute peripancreatic fluid and inflammation.  This extends into the periportal and perisplenic regions. Fluid and  inflammation also extends into the left retroperitoneum greater than  right. No evidence of discrete pocket of fluid collection or  pseudoaneurysm.    Heterogeneous liver with few irregular hypodensity throughout the  liver. For example 1.2 x 1.7 hypodense subcapsular lesion in hepatic  segment 7 of the liver (series 9 image 31). Chronically occluded  intrahepatic portal and main portal veins associated with numerous  portosystemic varices in the papito hepatis collateral vessels in the  left upper quadrant. Gallbladder wall appears slightly and which  appears to be due to chronic portal venous changes and liver disease  with collateralization. This is similar in appearance to prior CT.  Splenomegaly round hypodensity in the anterior spleen measuring 7 mm,  previously 4 mm on 4/5/2020. Mild hypodense lesion in the upper pole  of the left kidney measuring up to 2.0 cm, unchanged. Additional too  small to characterize low-density lesion statistically likely cysts as  well. No hydronephrosis. Urinary bladder is unremarkable. Normal  pelvic organs.    Stomach is unremarkable. Mild thickening of the duodenal wall,  likely  reactive.  Visualized small and large bowels are within normal limits.  Mild mesenteric edema. No retroperitoneal or mesenteric adenopathy.  Small volume of ascites mainly in the pelvis. There is some presacral  edema.    Soft Tissue/Osseous structures: No acute osseous or soft tissue  lesion.      Impression    IMPRESSION:  1. Acute interstitial edematous pancreatitis on chronic pancreatitis  associated with mild to moderate acute peripancreatic collection. No  evidence of drainable pocket of fluid collection or pseudoaneurysm  identified.  2. Hepatic cirrhosis with chronically occluded intrahepatic portal and  main portal veins associated with numerous portosystemic varices in  the papito hepatis and upper abdomen. Ascites.  3. Mesenteric edema and reactive bowel change secondary to acute  pancreatitis.    I have personally reviewed the examination and initial interpretation  and I agree with the findings.    BETTE YUNG MD     Medications     lactated ringers 150 mL/hr at 10/23/20 0552       acetaminophen  975 mg Oral Q8H     lidocaine  1 patch Transdermal Q24h    And     lidocaine   Transdermal Q8H     metoprolol succinate ER  25 mg Oral Daily     rivaroxaban ANTICOAGULANT  20 mg Oral Daily with supper     sodium chloride (PF)  3 mL Intracatheter Q8H

## 2020-10-24 NOTE — PLAN OF CARE
AVSS on RA. Pt reports mild abdominal pain with movements overnight, denies nausea. Pain managed with scheduled tylenol and lidocaine patch, no PRNs given. LR infusing at 75ml/hr. Pt slept well between cares. Continue to monitor.    Problem: Adult Inpatient Plan of Care  Goal: Optimal Comfort and Wellbeing  Outcome: Improving     Problem: Pain Acute  Goal: Acceptable Pain Control and Functional Ability  Outcome: Improving

## 2020-10-24 NOTE — PLAN OF CARE
"BP (!) 149/84 (BP Location: Right arm)   Pulse 74   Temp 97.5  F (36.4  C) (Oral)   Resp 16   Ht 1.626 m (5' 4\")   Wt 63.6 kg (140 lb 4.8 oz)   SpO2 99%   BMI 24.08 kg/m  . There are 2 PIV, patent and saline locked. Patient is A & O x 4. Patient c/o mild abdominal pain and mild nausea. Patient stated pain is under controlled and received compazine x 1 with relief. Patient c/o constipation, received senna and miralax with some relief. Patient is eating and drinking fair. Patient started Creon 2 capsules with meals. Xarelto is on hold for PD stenting on Monday. Continue to encourage patient to cough, deep breath, sit up while eating/drinking and sit up in the chair during the day. Continue with plan of care and notify MD for status changes.    Problem: Adult Inpatient Plan of Care  Goal: Plan of Care Review  Outcome: No Change  Flowsheets (Taken 10/24/2020 1641)  Plan of Care Reviewed With:   patient   spouse   caregiver     Problem: Adult Inpatient Plan of Care  Goal: Absence of Hospital-Acquired Illness or Injury  Intervention: Identify and Manage Fall Risk  Recent Flowsheet Documentation  Taken 10/24/2020 0831 by Madonna Garcia RN  Safety Promotion/Fall Prevention:   activity supervised   fall prevention program maintained   nonskid shoes/slippers when out of bed     Problem: Adult Inpatient Plan of Care  Goal: Absence of Hospital-Acquired Illness or Injury  Intervention: Prevent Skin Injury  Recent Flowsheet Documentation  Taken 10/24/2020 0831 by Madonna Garcia RN  Body Position: position changed independently     Problem: Adult Inpatient Plan of Care  Goal: Absence of Hospital-Acquired Illness or Injury  Intervention: Prevent Infection  Recent Flowsheet Documentation  Taken 10/24/2020 0831 by Madonna Garcia RN  Infection Prevention:   cohorting utilized   environmental surveillance performed   hand hygiene promoted     Problem: Pain Acute  Goal: Acceptable Pain Control and " Functional Ability  Intervention: Develop Pain Management Plan  Recent Flowsheet Documentation  Taken 10/24/2020 0831 by Madonna Garcia RN  Pain Management Interventions: medication (see MAR)

## 2020-10-24 NOTE — PROGRESS NOTES
[facility]     Progress Note - Katia 2 Service        Date of Admission:  10/21/2020    Assessment & Plan       Amanda Sandoval is a 70 year old female with a past medical history of chronic pancreatitis in setting of PRSS1 carrier state (complicated by portal vein thrombosis on DOAC) and HTN admitted with acute on chronic pancreatitis.     Changes today:  - Discontinue IVF  - Tolerating clears, advance to full liquids  - Pancreatic duct stenting anticipated Monday, 10/26   - Transition to PO analgesia as tolerated-- has oxycodone 5-10 mg Q4H available with IV dilaudid for breakthrough if needed    Acute on chronic pancreatitis 2/2 PRSS1 gene positive  Acute on chronic pain  Admitted following failure of outpatient management of early acute on chronic pancreatitis.  Initial episode of acute pancreatitis in February 2020 with multiple (11-12) recurrent episodes thereafter.  Classic abdominal pain consistent with prior episodes, lipase elevated at 1337.  No significant lab abnormalities otherwise with creatinine at baseline (0.66 on admission), no electrolyte abnormalities, LFTs within normal limits.  Will manage conservatively with IV fluid resuscitation, pain control, symptomatic management with antiemetics.  Previously discussions regarding potential cholecystectomy given presence of biliary sludge potentially suggestive of biliary origin despite PRSS1 carrier state.  GI made no mention of pursing this at this time. Followed by Dr. Morton. Will restart creon when the patient is able to tolerate oral intake.   - Discontinue IVF with continued improvements in PO intake  - Pain plan:              - 0.2 - 0.4 mg IV dilaudid q2h PRN               - APAP 975 mg every 8 hour, lidocaine patch as needed, heat/ice as needed  - Symptomatic management: Zofran, Compazine, Reglan available as needed  - Strict I/Os  - Patient amendable to meeting with chronic pain specialist to help  minimize pain between episodes.     Hyperchloremia  Without hypernatremia or metabolic acidosis.  - BMP in AM    Anemia, normocytic, improving  Dilutional.  - CBC in AM    Cirrhosis  As demonstrated on CT AP on 10/13.   - Follow up with hepatology as outpatient    Thrombocytopenia  Mild thrombocytopenia to 120s-130s over past several months. Possibly 2/2 cirrhosis.     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    - Continue PTA 20 mg oral Xarelto.   Hx HTN  - Continue PTA metoprolol     Diet: Room Service  Advance Diet as Tolerated: Clear Liquid Diet; Full Liquid Diet  NPO per Anesthesia Guidelines for Procedure/Surgery Except for: Meds    Fluids: 100 mL/hr LR  Lines: 1x PIV right hand  DVT Prophylaxis: Xarelto  Delgado Catheter: not present  Code Status:   Full code         Disposition Plan   Expected discharge: 1 - 2 days, recommended to prior living arrangement once adequate pain management/ tolerating PO medications.  Entered: Jeanne Grullon MD 10/24/2020, 6:51 AM       The patient's care was discussed with the Attending Physician, Dr. Smith and Patient.    Jeanne Grullon  PGY-3, Internal Medicine  P: [phone]    ______________________________________________________________________    Interval History   Nursing notes reviewed. No acute events overnight. Tolerated clears x 2 meals. Abdominal pain tolerable and rated 2-3 out of 10 with tylenol administration alone. No PO or IV opioids administered. HA resolved after discontinuation of zofran. Has some mild pain in the ankles without significant swelling. No bowel movement.     Patient aware of plan for PD stenting on Monday, 10/26.     4 point ROS otherwise negative.      Data reviewed today: I reviewed all medications, new labs and imaging results over the last 24 hours. I personally reviewed the EKG tracing showing normal sinus rhythm  .    Physical Exam   Vital Signs: Temp: 98.2  F (36.8  C) Temp src: Oral BP: 119/66 Pulse: 67   Resp: 16 SpO2: 96 % O2 Device: None (Room air)    Weight: 140 lbs 0 oz  Constitutional: awake, alert, cooperative, comfortable appearing  Eyes: Lids and lashes normal, pupils equal, round and reactive to light, sclera clear, conjunctiva normal  ENT: Normocephalic, without obvious abnormality, atraumatic  Respiratory: No increased work of breathing. Breathing comfortably on room air. CAB with no wheezes or crackles  Cardiovascular: Normal apical impulse, regular rate and rhythm, normal S1 and S2, no S3 or S4, and no murmur noted  GI: No external abnormalities. Active BS. LUQ extremely tender to palpation. No rebound tenderness  Neurologic: Awake, alert, oriented to name, place and time.  Cranial nerves II-XII are grossly intact.     Data   Recent Labs   Lab 10/24/20  0320 10/23/20  0341 10/22/20  0547 10/21/20  1019   WBC 4.4 5.6 8.7 9.9   HGB 10.7* 9.0* 10.7* 12.7   MCV 91 92 92 93   * 91* 97* 127*   INR  --   --  2.46*  --     140 142 141   POTASSIUM 3.7 3.6 4.0 4.6   CHLORIDE 112* 110* 112* 111*   CO2 26 25 24 27   BUN 8 8 12 13   CR 0.62 0.57 0.63 0.66   ANIONGAP 6 5 6 3   PASCUAL 9.3 9.3 9.2 10.0   GLC 84 77 82 88   ALBUMIN 2.9* 2.8* 2.8* 3.8   PROTTOTAL 6.2* 5.8* 5.7* 7.2   BILITOTAL 1.3 1.4* 1.8* 1.0   ALKPHOS 126 108 108 150   ALT 16 18 14 23   AST 14 13 13 32   LIPASE  --   --   --  1,337*     No results found for this or any previous visit (from the past 24 hour(s)).  Medications     lactated ringers 75 mL/hr at 10/24/20 0305       acetaminophen  975 mg Oral Q8H     lidocaine  1 patch Transdermal Q24h    And     lidocaine   Transdermal Q8H     metoprolol succinate ER  25 mg Oral Daily     rivaroxaban ANTICOAGULANT  20 mg Oral Daily with supper     sodium chloride (PF)  3 mL Intracatheter Q8H

## 2020-10-25 NOTE — PLAN OF CARE
"/80 (BP Location: Right arm)   Pulse 60   Temp 98.2  F (36.8  C) (Oral)   Resp 16   Ht 1.626 m (5' 4\")   Wt 62.3 kg (137 lb 4.8 oz)   SpO2 99%   BMI 23.57 kg/m  . PIV is patent and saline locked. Patient is A & O x 4. No c/o pain or n/v during this shift. Patient had good bowel movement x 2 and refused the am senna dose. Patient is on full liquid diet and ate 100% of her breakfast and lunch. Patient showered and no new events during this shift. NPO at midnight for Possible am PD stenting placement. Continue with plan of care and notify MD for status changes.    Problem: Adult Inpatient Plan of Care  Goal: Plan of Care Review  Outcome: No Change  Flowsheets (Taken 10/25/2020 1336)  Plan of Care Reviewed With: patient     Problem: Adult Inpatient Plan of Care  Goal: Absence of Hospital-Acquired Illness or Injury  Intervention: Identify and Manage Fall Risk  Recent Flowsheet Documentation  Taken 10/25/2020 0830 by Madonna Garcia RN  Safety Promotion/Fall Prevention:   clutter free environment maintained   fall prevention program maintained   nonskid shoes/slippers when out of bed   room organization consistent   safety round/check completed     Problem: Adult Inpatient Plan of Care  Goal: Absence of Hospital-Acquired Illness or Injury  Intervention: Prevent Skin Injury  Recent Flowsheet Documentation  Taken 10/25/2020 0830 by Madonna Garcia RN  Body Position: position changed independently     Problem: Adult Inpatient Plan of Care  Goal: Absence of Hospital-Acquired Illness or Injury  Intervention: Prevent and Manage VTE (Venous Thromboembolism) Risk  Recent Flowsheet Documentation  Taken 10/25/2020 0830 by Madonna Garcia RN  VTE Prevention/Management:   ambulation promoted   bleeding risk assessed     Problem: Adult Inpatient Plan of Care  Goal: Absence of Hospital-Acquired Illness or Injury  Intervention: Prevent Infection  Recent Flowsheet Documentation  Taken 10/25/2020 0830 " by Madonna Garcia, FANY  Infection Prevention:   cohorting utilized   hand hygiene promoted     Problem: Pain Acute  Goal: Acceptable Pain Control and Functional Ability  Intervention: Prevent or Manage Pain  Recent Flowsheet Documentation  Taken 10/25/2020 0830 by Madonan Garcia, RN  Sleep/Rest Enhancement:   awakenings minimized   relaxation techniques promoted  Bowel Elimination Promotion: ambulation promoted

## 2020-10-25 NOTE — PLAN OF CARE
Temp: 97.8  F (36.6  C) Temp src: Oral BP: 132/87 Pulse: 79   Resp: 16 SpO2: 97 % O2 Device: None (Room air)      A&Ox4, AVSS, independent, full liquid diet. Patient did not have bowel movement overnight, voided good amount. WBC 3.8, Hgb 11.1, Plt 140. Patient denied pain/nausea overnight. Patient removed lidocaine patch, see eMAR. No PRNs given. Saline locked. Will continue to monitor and follow plan of care.    Problem: Adult Inpatient Plan of Care  Goal: Plan of Care Review  Outcome: No Change  Goal: Patient-Specific Goal (Individualized)  Outcome: No Change  Goal: Absence of Hospital-Acquired Illness or Injury  Outcome: No Change  Intervention: Identify and Manage Fall Risk  Recent Flowsheet Documentation  Taken 10/25/2020 0000 by Gold Zafar  Safety Promotion/Fall Prevention:    clutter free environment maintained    fall prevention program maintained    lighting adjusted    nonskid shoes/slippers when out of bed    room organization consistent    safety round/check completed  Intervention: Prevent Skin Injury  Recent Flowsheet Documentation  Taken 10/25/2020 0000 by Gold Zafar  Body Position: position changed independently  Intervention: Prevent and Manage VTE (Venous Thromboembolism) Risk  Recent Flowsheet Documentation  Taken 10/25/2020 0000 by Gold Zafar  VTE Prevention/Management: ambulation promoted  Intervention: Prevent Infection  Recent Flowsheet Documentation  Taken 10/25/2020 0000 by Gold Zafar  Infection Prevention:    hand hygiene promoted    rest/sleep promoted  Goal: Optimal Comfort and Wellbeing  Outcome: No Change  Goal: Readiness for Transition of Care  Outcome: No Change     Problem: Pain Acute  Goal: Acceptable Pain Control and Functional Ability  Outcome: No Change  Intervention: Prevent or Manage Pain  Recent Flowsheet Documentation  Taken 10/25/2020 0000 by Gold Zafar  Bowel Elimination Promotion:    adequate fluid intake promoted    ambulation promoted

## 2020-10-25 NOTE — PROGRESS NOTES
[facility]     Progress Note - Marcarol 2 Service        Date of Admission:  10/21/2020    Assessment & Plan       Amanda Sandoval is a 70 year old female with a past medical history of chronic pancreatitis in setting of PRSS1 carrier state (complicated by portal vein thrombosis on DOAC) and HTN admitted with acute on chronic pancreatitis.     Changes today:  - Continue full liquid diet  - Pancreatic duct stenting anticipated Monday, 10/26 , NPO at midnight  - Anticoagulation held prior to procedure  - Transition to PO analgesia as tolerated-- has oxycodone 5-10 mg Q4H available with IV dilaudid for breakthrough if needed    Acute on chronic pancreatitis 2/2 PRSS1 gene positive  Acute on chronic pain  Admitted following failure of outpatient management of early acute on chronic pancreatitis.  Initial episode of acute pancreatitis in February 2020 with multiple (11-12) recurrent episodes thereafter.  Classic abdominal pain consistent with prior episodes, lipase elevated at 1337.  No significant lab abnormalities otherwise with creatinine at baseline (0.66 on admission), no electrolyte abnormalities, LFTs within normal limits.  Will manage conservatively with IV fluid resuscitation, pain control, symptomatic management with antiemetics.  Previously discussions regarding potential cholecystectomy given presence of biliary sludge potentially suggestive of biliary origin despite PRSS1 carrier state.  GI made no mention of pursing this at this time. Followed by Dr. Morton. Will restart creon when the patient is able to tolerate oral intake.   - Continue full liquid diet 10/25  - Pain plan:              - 0.2 - 0.4 mg IV dilaudid q2h PRN               - APAP 975 mg every 8 hour, lidocaine patch as needed, heat/ice as needed  - Symptomatic management: Compazine, Reglan available as needed (no zofran 2/2 HA)  - Strict I/Os    Hyperchloremia  Without hypernatremia or metabolic  acidosis.  - BMP in AM    Anemia, normocytic, improving  Dilutional.  - CBC in AM    Cirrhosis  As demonstrated on CT AP on 10/13.   - Follow up with hepatology as outpatient    Thrombocytopenia  Mild thrombocytopenia to 120s-130s over past several months. Possibly 2/2 cirrhosis.     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    - On rivaroxaban 20 mg daily PTA-- held in advance of PD stenting  Hx HTN  - Continue PTA metoprolol     Diet: Room Service  NPO per Anesthesia Guidelines for Procedure/Surgery Except for: Meds  Full Liquid Diet    Fluids: 100 mL/hr LR  Lines: 1x PIV right hand  DVT Prophylaxis: Xarelto  Delgado Catheter: not present  Code Status:   Full code         Disposition Plan   Expected discharge: 1 - 2 days, recommended to prior living arrangement once adequate pain management/ tolerating PO medications.  Entered: Jeanne Grullon MD 10/25/2020, 8:08 AM       The patient's care was discussed with the Attending Physician, Dr. Smith and Patient.    Jeanne Grullon  PGY-3, Internal Medicine  P: [phone]    ______________________________________________________________________    Interval History   Nursing notes reviewed. No acute events overnight. Tolerated fulls (soup and ice cream). Pain rated 1 out of 10. No PO or IV opioids administered. Bowel movement after escalation of bowel regimen. No concerns or complaints otherwise.     Patient aware of plan for PD stenting on Monday, 10/26.     4 point ROS otherwise negative.      Data reviewed today: I reviewed all medications, new labs and imaging results over the last 24 hours. I personally reviewed the EKG tracing showing normal sinus rhythm .    Physical Exam   Vital Signs: Temp: 98.2  F (36.8  C) Temp src: Oral BP: 133/74 Pulse: 60   Resp: 16 SpO2: 96 % O2 Device: None (Room air)    Weight: 137 lbs 4.8 oz  Constitutional: awake,  alert, cooperative, comfortable appearing, sitting upright in chair at bedside  Eyes: Lids and lashes normal, pupils equal, round and reactive to light, sclera clear, conjunctiva normal  ENT: Normocephalic, without obvious abnormality, atraumatic  Respiratory: No increased work of breathing. Breathing comfortably on room air. CAB with no wheezes or crackles  Cardiovascular: Normal apical impulse, regular rate and rhythm, normal S1 and S2, no S3 or S4, and no murmur noted  GI: No external abnormalities. Active BS. LUQ extremely tender to palpation. No rebound tenderness  Neurologic: Awake, alert, oriented to name, place and time.  Cranial nerves II-XII are grossly intact.     Data   Recent Labs   Lab 10/25/20  0450 10/24/20  0320 10/23/20  0341 10/22/20  0547 10/21/20  1019   WBC 3.8* 4.4 5.6 8.7 9.9   HGB 11.1* 10.7* 9.0* 10.7* 12.7   MCV 91 91 92 92 93   * 125* 91* 97* 127*   INR  --   --   --  2.46*  --     143 140 142 141   POTASSIUM 3.6 3.7 3.6 4.0 4.6   CHLORIDE 112* 112* 110* 112* 111*   CO2 25 26 25 24 27   BUN 7 8 8 12 13   CR 0.62 0.62 0.57 0.63 0.66   ANIONGAP 5 6 5 6 3   PASCUAL 9.9 9.3 9.3 9.2 10.0   GLC 92 84 77 82 88   ALBUMIN 2.9* 2.9* 2.8* 2.8* 3.8   PROTTOTAL 6.5* 6.2* 5.8* 5.7* 7.2   BILITOTAL 0.8 1.3 1.4* 1.8* 1.0   ALKPHOS 149 126 108 108 150   ALT 20 16 18 14 23   AST 18 14 13 13 32   LIPASE  --   --   --   --  1,337*     No results found for this or any previous visit (from the past 24 hour(s)).  Medications       acetaminophen  975 mg Oral Q8H     amylase-lipase-protease  2-3 capsule Oral TID w/meals     lidocaine  1 patch Transdermal Q24h    And     lidocaine   Transdermal Q8H     metoprolol succinate ER  25 mg Oral Daily     polyethylene glycol  17 g Oral Daily     [Held by provider] rivaroxaban ANTICOAGULANT  20 mg Oral Daily with supper     senna-docusate  1 tablet Oral At Bedtime     sennosides  8.6 mg Oral Daily     sodium chloride (PF)  3 mL Intracatheter Q8H

## 2020-10-25 NOTE — PLAN OF CARE
"/87 (BP Location: Right arm)   Pulse 69   Temp 97.9  F (36.6  C) (Oral)   Resp 16   Ht 1.626 m (5' 4\")   Wt 62.3 kg (137 lb 4.8 oz)   SpO2 100%   BMI 23.57 kg/m      1530 - 1930    Neuro: A&Ox4.   Cardiac: SR. VSS.   Respiratory: Sating 100% on RA.  GI/: No bm. Adequate urine output.   Diet/appetite: Tolerating full liquids diet. Eating well.  Activity:  Independent  up to chair and bathroom.  Pain: At acceptable level on current regimen.   Skin: No new deficits noted.  LDA's:PIV saline locked.  Status: Stable.  Plan: NPO after midnight for possible pancreatic stent. Continue with POC. Notify primary team with changes.  "

## 2020-10-25 NOTE — PROGRESS NOTES
"    GASTROENTEROLOGY PROGRESS NOTE    Date: 10/25/2020     ASSESSMENT:  70 year old female with a h/o chronic pancreatitis related to PRSS1 mutation and portal vein thrombosis on rivaroxaban who is presenting w/ 4 days of LUQ pain and N/V, c/f acute on chronic pancreatitis; BISAP 1.     CT abd/pelvis c/w acute edematous pancreatitis on chronic pancreatitis w/ a peripancreatic fluid collection, chronically occluded portal veins, numerous portosystemic varices, and ascites. Cirrhosis and thrombocytopenia are new (patient had a CT showing steatosis in June).     RECOMMENDATIONS:  - We discussed the new finding of cirrhosis with her, she will need OP f/u which we will arrange   - Agree w/ fluids and nausea/pain control per primary team   - PD stenting in the OR on Monday, hold AC starting on Sunday; NPO Sunday evening     Gastroenterology will continue to follow.      Thank you for involving us in this patient's care. Please do not hesitate to contact the GI service with any questions or concerns.      Pt care plan discussed with Dr. Farnsworth, GI staff physician.    Carmen Dent M.D.   GI Fellow  [phone]  _______________________________________________________________    Subjective: NAEO; patient denied any abdominal pain, N/V, fevers chills; was having abdominal cramping last night related to constipation but had a BM this AM and feels much better     Objective:  Blood pressure 133/74, pulse 60, temperature 98.2  F (36.8  C), temperature source Oral, resp. rate 16, height 1.626 m (5' 4\"), weight 62.3 kg (137 lb 4.8 oz), SpO2 96 %.    Constitutional: NAD, on RA  Eyes: conjunctiva anicteric  CV: No edema  Respiratory: Unlabored breathing  Abd: nondistended, NT, no peritoneal signs  Skin: warm, perfused, no jaundice  Neuro: AAO x 3, fluent speech   Psych: Normal affect   MSK: No gross deformities      LABS:  BMP  Recent Labs   Lab 10/25/20  0450 10/24/20  0320 10/23/20  0341 10/22/20  0547    143 140 142 "   POTASSIUM 3.6 3.7 3.6 4.0   CHLORIDE 112* 112* 110* 112*   PASCUAL 9.9 9.3 9.3 9.2   CO2 25 26 25 24   BUN 7 8 8 12   CR 0.62 0.62 0.57 0.63   GLC 92 84 77 82     CBC  Recent Labs   Lab 10/25/20  0450 10/24/20  0320 10/23/20  0341 10/22/20  0547   WBC 3.8* 4.4 5.6 8.7   RBC 3.84 3.76* 3.14* 3.75*   HGB 11.1* 10.7* 9.0* 10.7*   HCT 34.9* 34.3* 28.9* 34.4*   MCV 91 91 92 92   MCH 28.9 28.5 28.7 28.5   MCHC 31.8 31.2* 31.1* 31.1*   RDW 13.6 13.7 13.8 14.1   * 125* 91* 97*     INR  Recent Labs   Lab 10/22/20  0547   INR 2.46*     LFTs  Recent Labs   Lab 10/25/20  0450 10/24/20  0320 10/23/20  0341 10/22/20  0547   ALKPHOS 149 126 108 108   AST 18 14 13 13   ALT 20 16 18 14   BILITOTAL 0.8 1.3 1.4* 1.8*   PROTTOTAL 6.5* 6.2* 5.8* 5.7*   ALBUMIN 2.9* 2.9* 2.8* 2.8*      PANC  Recent Labs   Lab 10/21/20  1019   LIPASE 1,337*       IMAGING:  CT ABDOMEN WO & W & PELVIS WO CONTRAST   10/22/2020  IMPRESSION:  1. Acute interstitial edematous pancreatitis on chronic pancreatitis  associated with mild to moderate acute peripancreatic collection. No  evidence of drainable pocket of fluid collection or pseudoaneurysm  identified.  2. Hepatic cirrhosis with chronically occluded intrahepatic portal and  main portal veins associated with numerous portosystemic varices in  the papito hepatis and upper abdomen. Ascites.  3. Mesenteric edema and reactive bowel change secondary to acute  pancreatitis.

## 2020-10-25 NOTE — PLAN OF CARE
AVSS on RA. Pt reports abdominal cramping overnight that kept her awake till about 0100. Pt declined any interventions, thinks that its the laxatives taken today. Reports no pain this morning, denies nausea. Voiding adequately. Labs stable. Pt would like diet advanced today if possible. Continue to monitor.     Problem: Adult Inpatient Plan of Care  Goal: Optimal Comfort and Wellbeing  Outcome: Improving     Problem: Pain Acute  Goal: Acceptable Pain Control and Functional Ability  Outcome: Improving

## 2020-10-26 NOTE — OR NURSING
"Pt. Quite nauseated. SeaBands, Lavender, cool cloth, and dimmed lights provided. Dr. Dimas at bedside. Aware of nausea. Explained procedure to patient and explained that stenting may be what is going to assist her the most. AVSS. Pt. Declined IV Zofran secondary to headaches. Compazine IV assisted with nausea \"some\". After speaking with Dr. Dimas, patient accepted Zofran IV. Dr. Graff at bedside. Alerted anesthesiologist to nausea and previous interventions. Ordered Reglan IV 5 mg. Administered. Pt. Sleeping soundly between cares. Reported no other issues beside extreme nausea and several emesis/dry heaving episodes.  " Abdominal Pain, N/V/D

## 2020-10-26 NOTE — ANESTHESIA PROCEDURE NOTES
Airway   Date/Time: 10/26/2020 4:03 PM   Patient location during procedure: OR    Staff -   CRNA: Maite Najera APRN CRNA  Performed By: CRNA and with residents    Consent for Airway   Urgency: elective    Indications and Patient Condition  Indications for airway management: derrick-procedural  Induction type:intravenousMask difficulty assessment: 1 - vent by mask    Final Airway Details  Final airway type: endotracheal airway  Successful airway:ETT - single  Endotracheal Airway Details   ETT size (mm): 7.0  Cuffed: yes  Successful intubation technique: direct laryngoscopy  Grade View of Cords: 1  Adjucts: stylet  Measured from: gums/teeth  Secured at (cm): 20  Secured with: pink tape  Bite Block used: GI Bite Block.    Post intubation assessment   Placement verified by: capnometry and equal breath sounds   Number of attempts at approach: 1  Number of other approaches attempted: 0  Secured with:pink tape  Ease of procedure: easy  Dentition: Intact and Unchanged

## 2020-10-26 NOTE — PROGRESS NOTES
[facility]     Progress Note - Marcarol 2 Service        Date of Admission:  10/21/2020    Assessment & Plan       Amanda Sandoval is a 70 year old female with a past medical history of chronic pancreatitis in setting of PRSS1 carrier state (complicated by portal vein thrombosis on DOAC) and HTN admitted with acute on chronic pancreatitis.     Changes today:  - PS stent placement    Acute on chronic pancreatitis 2/2 PRSS1 gene positive  Acute on chronic pain  Admitted following failure of outpatient management of early acute on chronic pancreatitis.  Initial episode of acute pancreatitis in February 2020 with multiple (11-12) recurrent episodes thereafter.  Classic abdominal pain consistent with prior episodes, lipase elevated at 1337.  No significant lab abnormalities otherwise with creatinine at baseline (0.66 on admission), no electrolyte abnormalities, LFTs within normal limits.  Will manage conservatively with IV fluid resuscitation, pain control, symptomatic management with antiemetics.  Previously discussions regarding potential cholecystectomy given presence of biliary sludge potentially suggestive of biliary origin despite PRSS1 carrier state.  GI made no mention of pursing this at this time. Followed by Dr. Morton. Will restart creon when the patient is able to tolerate oral intake. Currently kept NPO due to the procedure    - Pain plan:              - 0.2 - 0.4 mg IV dilaudid q2h PRN               - APAP 975 mg every 8 hour, lidocaine patch as needed, heat/ice as needed  - Symptomatic management: Compazine, Reglan available as needed (no zofran 2/2 HA)  - Strict I/Os    Hyperchloremia  Without hypernatremia or metabolic acidosis.  - BMP in AM    Anemia, normocytic, improving  Dilutional.  - CBC in AM    Cirrhosis  As demonstrated on CT AP on 10/13.   - Follow up with hepatology as outpatient    Thrombocytopenia  Mild thrombocytopenia to 120s-130s over past  several months. Possibly 2/2 cirrhosis.     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    - On rivaroxaban 20 mg daily PTA-- held in advance of PD stenting  Hx HTN  - Continue PTA metoprolol     Diet: Room Service  NPO per Anesthesia Guidelines for Procedure/Surgery Except for: Meds    Fluids: 100 mL/hr LR  Lines: 1x PIV right hand  DVT Prophylaxis: Xarelto  Delgado Catheter: not present  Code Status:   Full code         Disposition Plan   Expected discharge: 1 - 2 days, recommended to prior living arrangement once adequate pain management/ tolerating PO medications.  Entered: America Ruiz MD 10/26/2020, 5:55 PM       The patient's care was discussed with the Attending Physician, Dr. Smith and Patient.    Jeanne Grullon  PGY-3, Internal Medicine  P: [id number]    ______________________________________________________________________    Interval History   Nursing notes reviewed. No acute events overnight. Stated that feeling okay and wants to go home after the procedure. No other complaints. Still has abdominal pain which is tolerable. .     4 point ROS otherwise negative.      Data reviewed today: I reviewed all medications, new labs and imaging results over the last 24 hours. Physical Exam   Vital Signs: Temp: 97.8  F (36.6  C) Temp src: Oral BP: (!) 142/95 Pulse: 73   Resp: 16 SpO2: 99 % O2 Device: None (Room air)    Weight: 134 lbs 1.6 oz  Constitutional: awake, alert, cooperative, comfortable appearing, sitting upright in chair at bedside  Eyes: Lids and lashes normal, pupils equal, round and reactive to light, sclera clear, conjunctiva normal  ENT: Normocephalic, without obvious abnormality, atraumatic  Respiratory: No increased work of breathing. Breathing comfortably on room air. CAB with no wheezes or crackles  Cardiovascular: Normal apical impulse, regular rate and rhythm, normal S1 and S2, no S3 or S4,  and no murmur noted  GI: No external abnormalities. Active BS. LUQ extremely tender to palpation. No rebound tenderness  Neurologic: Awake, alert, oriented to name, place and time.  Cranial nerves II-XII are grossly intact.     Data   Recent Labs   Lab 10/26/20  1219 10/26/20  0502 10/25/20  0450 10/24/20  0320 10/22/20  0547 10/22/20  0547 10/21/20  1019   WBC  --  3.1* 3.8* 4.4   < > 8.7 9.9   HGB  --  10.8* 11.1* 10.7*   < > 10.7* 12.7   MCV  --  89 91 91   < > 92 93   PLT  --  162 140* 125*   < > 97* 127*   INR 1.10  --   --   --   --  2.46*  --    NA  --  142 142 143   < > 142 141   POTASSIUM  --  3.9 3.6 3.7   < > 4.0 4.6   CHLORIDE  --  111* 112* 112*   < > 112* 111*   CO2  --  25 25 26   < > 24 27   BUN  --  8 7 8   < > 12 13   CR  --  0.57 0.62 0.62   < > 0.63 0.66   ANIONGAP  --  6 5 6   < > 6 3   PASCUAL  --  10.1 9.9 9.3   < > 9.2 10.0   GLC  --  86 92 84   < > 82 88   ALBUMIN  --  2.9* 2.9* 2.9*   < > 2.8* 3.8   PROTTOTAL  --  6.5* 6.5* 6.2*   < > 5.7* 7.2   BILITOTAL  --  0.5 0.8 1.3   < > 1.8* 1.0   ALKPHOS  --  180* 149 126   < > 108 150   ALT  --  22 20 16   < > 14 23   AST  --  23 18 14   < > 13 32   LIPASE  --   --   --   --   --   --  1,337*    < > = values in this interval not displayed.     No results found for this or any previous visit (from the past 24 hour(s)).  Medications     lactated ringers         [Auto Hold] acetaminophen  975 mg Oral Q8H     [Auto Hold] amylase-lipase-protease  2-3 capsule Oral TID w/meals     [Auto Hold] lidocaine  1 patch Transdermal Q24h    And     [Auto Hold] lidocaine   Transdermal Q8H     [Auto Hold] metoprolol succinate ER  25 mg Oral Daily     [Auto Hold] polyethylene glycol  17 g Oral Daily     [Held by provider] rivaroxaban ANTICOAGULANT  20 mg Oral Daily with supper     [Auto Hold] senna-docusate  1 tablet Oral At Bedtime     [Auto Hold] sennosides  8.6 mg Oral Daily     [Auto Hold] sodium chloride (PF)  3 mL Intracatheter Q8H

## 2020-10-26 NOTE — BRIEF OP NOTE
[facility] Brief Operative Note    Pre-operative diagnosis: Acute-on-chronic pancreatitis   Post-operative diagnosis Dorsal pancreatic duct stricture   Procedure: Procedure(s):  ENDOSCOPIC RETROGRADE CHOLANGIOPANCREATOGRAPHY WITH PANCREATIC DUCT BALLOON DILATION, AND STENT PLACEMENT.   Surgeon: Guru Javad MD   Assistants(s): Todd Dimas MD   Estimated blood loss: None    Specimens: None       Findings:  - Prior biliary sphincterotomy appeared open.   - Prior minor papilla sphincterotomy appeared open.  - Pancreatogram with moderate-to-high grade short stricture in the distal dorsal pancreatic duct (ie., dorsal PD immediately upstream from minor papilla). This stricture has worsened in severity compared to last ERCP (7/21/20), at which time pancreatic stents were removed to attempt a stent-free trial.  - Minor papilla (dorsal PD orifice) and distal dorsal PD was successfully dilated to 3.5-mm using an angioplasty balloon catheter.  - One Zimmon 5-Fr x 10-cm single external pigtail plastic stent was placed into the dorsal pancreatic duct.   - One Zimmon 4-Fr x 8-cm single external pigtail plastic stent was placed into the dorsal pancreatic duct.     Recommendations:  - Observe in PACU and administer Lactated Ringer's 1 L IV bolus.  - Advance diet as tolerated.  - Schedule repeat ERCP in 4-8 weeks with either Dr. Farnsworth vs Dr. Morton.      Todd Dimas MD on 10/26/2020 at 6:41 PM

## 2020-10-26 NOTE — ANESTHESIA PREPROCEDURE EVALUATION
"Anesthesia Pre-Procedure Evaluation    Patient: Amanda Sandoval   MRN:     [id number] Gender:   female   Age:    70 year old :      1950        Preoperative Diagnosis: GI (gastrointestinal bleed) [K92.2]   Procedure(s):  ENDOSCOPIC RETROGRADE CHOLANGIOPANCREATOGRAPHY     LABS:  CBC:   Lab Results   Component Value Date    WBC 3.1 (L) 10/26/2020    WBC 3.8 (L) 10/25/2020    HGB 10.8 (L) 10/26/2020    HGB 11.1 (L) 10/25/2020    HCT 34.4 (L) 10/26/2020    HCT 34.9 (L) 10/25/2020     10/26/2020     (L) 10/25/2020     BMP:   Lab Results   Component Value Date     10/26/2020     10/25/2020    POTASSIUM 3.9 10/26/2020    POTASSIUM 3.6 10/25/2020    CHLORIDE 111 (H) 10/26/2020    CHLORIDE 112 (H) 10/25/2020    CO2 25 10/26/2020    CO2 25 10/25/2020    BUN 8 10/26/2020    BUN 7 10/25/2020    CR 0.57 10/26/2020    CR 0.62 10/25/2020    GLC 86 10/26/2020    GLC 92 10/25/2020     COAGS:   Lab Results   Component Value Date    INR 1.10 10/26/2020     POC:   Lab Results   Component Value Date    BGM 74 2020     OTHER:   Lab Results   Component Value Date    PASCUAL 10.1 10/26/2020    ALBUMIN 2.9 (L) 10/26/2020    PROTTOTAL 6.5 (L) 10/26/2020    ALT 22 10/26/2020    AST 23 10/26/2020    ALKPHOS 180 (H) 10/26/2020    BILITOTAL 0.5 10/26/2020    LIPASE 1,337 (H) 10/21/2020    AMYLASE 39 2020        Preop Vitals    BP Readings from Last 3 Encounters:   10/26/20 (!) 142/95   20 121/74   20 (!) 148/76    Pulse Readings from Last 3 Encounters:   10/26/20 73   20 60   20 81      Resp Readings from Last 3 Encounters:   10/26/20 16   20 16   20 16    SpO2 Readings from Last 3 Encounters:   10/26/20 99%   20 100%   20 97%      Temp Readings from Last 1 Encounters:   10/26/20 36.6  C (97.8  F) (Oral)    Ht Readings from Last 1 Encounters:   10/21/20 1.626 m (5' 4\")      Wt Readings from Last 1 Encounters:   10/26/20 60.8 kg (134 lb 1.6 oz)    Estimated " "body mass index is 23.02 kg/m  as calculated from the following:    Height as of this encounter: 1.626 m (5' 4\").    Weight as of this encounter: 60.8 kg (134 lb 1.6 oz).     LDA:  Peripheral IV 10/22/20 Right;Anterior;Medial Upper forearm (Active)   Site Assessment WDL 10/26/20 1300   Line Status Checked every 1 hour 10/26/20 1300   Dressing Intervention Dressing reinforced 10/23/20 0855   Phlebitis Scale 0-->no symptoms 10/26/20 1000   Infiltration Scale 0 10/26/20 1000   Infiltration Site Treatment Method  None 10/26/20 1000   If infiltrated, was a Vesicant infusing? No 10/24/20 0800   Number of days: 4        Past Medical History:   Diagnosis Date     Antiplatelet or antithrombotic long-term use      Arthritis      Cancer (H)      History of blood transfusion      History of ERCP     last stent placement via EUS/ERCP April 2020     HTN (hypertension)      Pancreatitis, chronic (H)      Portal vein thrombosis       Past Surgical History:   Procedure Laterality Date     ABDOMEN SURGERY       BACK SURGERY       BIOPSY       COLONOSCOPY       ENDOSCOPIC RETROGRADE CHOLANGIOPANCREATOGRAM N/A 4/23/2020    Procedure: ENDOSCOPIC RETROGRADE CHOLANGIOPANCREATOGRAPHY with Stent Exchange, Pancreatic Stent Placement, Biliary Duct Stent Placement, Pancreatic and Biliary Sphincterotomy, Pancreatic Sludge Removal;  Surgeon: Nate Morton MD;  Location: [facility] OR     ENDOSCOPIC RETROGRADE CHOLANGIOPANCREATOGRAM N/A 6/22/2020    Procedure: ENDOSCOPIC RETROGRADE CHOLANGIOPANCREATOGRAPHY WITH BILIARY SPHINCTEROTOMY AND STENT PLACEMENT, MINOR PAPILLOTOMY WITH STENT PLACEMENT AND DEBRIS REMOVAL AND VENTRAL PANCREATIC DUCT SPHINCTEROTOMY WITH STENT PLACEMENT;  Surgeon: Nate Morton MD;  Location: [facility] OR     ENDOSCOPIC RETROGRADE CHOLANGIOPANCREATOGRAPHY, EXCHANGE TUBE/STENT N/A 7/21/2020    Procedure: ENDOSCOPIC RETROGRADE CHOLANGIOPANCREATOGRAPHY with pancreatic stent exchange;  Surgeon: Nate Morton MD;  " Location: [facility] OR     ENDOSCOPIC ULTRASOUND UPPER GASTROINTESTINAL TRACT (GI) N/A 4/23/2020    Procedure: Diagnostic ENDOSCOPIC ULTRASOUND, ESOPHAGOSCOPY / UPPER GASTROINTESTINAL TRACT (GI) with Stent Removal;  Surgeon: Braden Ruiz MD;  Location: [facility] OR     ESOPHAGOSCOPY, GASTROSCOPY, DUODENOSCOPY (EGD), COMBINED N/A 6/4/2020    Procedure: ESOPHAGOGASTRODUODENOSCOPY (EGD);  Surgeon: Nate Morton MD;  Location: [facility] GI     ESOPHAGOSCOPY, GASTROSCOPY, DUODENOSCOPY (EGD), COMBINED N/A 6/4/2020    Procedure: Esophagogastroduodenoscopy, With Foreign Body Removal;  Surgeon: Nate Morton MD;  Location: [facility] GI     GYN SURGERY       HEAD & NECK SURGERY       ORTHOPEDIC SURGERY  2017    lumbar     ORTHOPEDIC SURGERY  2003    cervical      Allergies   Allergen Reactions     Penicillins Rash     Erythromycin Diarrhea, GI Disturbance and Nausea     GI upset.       Oxycodone Other (See Comments)     Other reaction(s): Constipation  severe constipation  severe constipation  Constipation       Nickel Rash     Penicillin G Rash        Anesthesia Evaluation     . Pt has had prior anesthetic. Type: General    No history of anesthetic complications          ROS/MED HX    ENT/Pulmonary:  - neg pulmonary ROS     Neurologic:  - neg neurologic ROS     Cardiovascular:     (+) hypertension----. Taking blood thinners : . . . :. .       METS/Exercise Tolerance:     Hematologic: Comments: Hx portal vein thrombosis  Thrombocytopenia     (+) History of blood clots pt is anticoagulated, -      Musculoskeletal:  - neg musculoskeletal ROS       GI/Hepatic: Comment: Chronic pancreatitis and new liver cirrhosis        Renal/Genitourinary:  - ROS Renal section negative       Endo:  - neg endo ROS       Psychiatric:  - neg psychiatric ROS       Infectious Disease:  - neg infectious disease ROS       Malignancy:         Other:                         PHYSICAL EXAM:   Mental Status/Neuro: A/A/O   Airway: Facies:  Feasible  Mallampati: I  Mouth/Opening: Full  TM distance: > 6 cm  Neck ROM: Full   Respiratory: Auscultation: CTAB     Resp. Rate: Normal     Resp. Effort: Normal      CV: Rhythm: Regular  Rate: Age appropriate  Heart: Normal Sounds  Edema: None   Comments:      Dental: Normal Dentition                Assessment:   ASA SCORE: 3    H&P: History and physical reviewed and following examination; no interval change.    NPO Status: NPO Appropriate     Plan:   Anes. Type:  General   Pre-Medication: None   Induction:  IV (Standard)   Airway: ETT; Oral   Access/Monitoring: PIV   Maintenance: Balanced     Postop Plan:   Postop Pain: Opioids  Postop Sedation/Airway: Not planned  Disposition: Inpatient/Admit     PONV Management:   Adult Risk Factors: Female, Postop Opioids   Prevention: Ondansetron, Dexamethasone     CONSENT: Direct conversation   Plan and risks discussed with: Patient   Blood Products: Consent Deferred (Minimal Blood Loss)                   Luis E Robert MD

## 2020-10-26 NOTE — PLAN OF CARE
AVSS on RA. Pt reports minimal abdominal soreness overnight. Pain well controlled with scheduled tylenol and lidocaine patch. Denies nausea. Plan for PD stenting today, time unknown. Pt NPO since midnight. Possible discharge today if pt has adequate pain control and has no complications. Continue to monitor.    Problem: Adult Inpatient Plan of Care  Goal: Optimal Comfort and Wellbeing  Outcome: No Change     Problem: Pain Acute  Goal: Acceptable Pain Control and Functional Ability  Outcome: No Change

## 2020-10-26 NOTE — ANESTHESIA CARE TRANSFER NOTE
Patient: Amanda Sandoval    Procedure(s):  ENDOSCOPIC RETROGRADE CHOLANGIOPANCREATOGRAPHY WITH PANCREATIC DUCT BALLOON DILATION, AND STENT PLACEMENT.    Diagnosis: GI (gastrointestinal bleed) [K92.2]  Diagnosis Additional Information: No value filed.    Anesthesia Type:   General     Note:  Airway :Nasal Cannula  Patient transferred to:PACU  Comments: VSS. Report to RN. 139/71. 99%. 75.Handoff Report: Identifed the Patient, Identified the Reponsible Provider, Reviewed the pertinent medical history, Discussed the surgical course, Reviewed Intra-OP anesthesia mangement and issues during anesthesia, Set expectations for post-procedure period and Allowed opportunity for questions and acknowledgement of understanding      Vitals: (Last set prior to Anesthesia Care Transfer)    CRNA VITALS  10/26/2020 1723 - 10/26/2020 1801      10/26/2020             EKG:  Sinus rhythm                Electronically Signed By: ANABELL Birch CRNA  October 26, 2020  6:01 PM

## 2020-10-26 NOTE — CONSULTS
Care Management Follow Up Note    Length of Stay (days) 5    Patient plan of care discussed at Interdisciplinary Rounds: yes  Expected Discharge Date: 10/28/20  Concerns to be Addressed:  Insurance/financial issues    Anticipated Discharge Disposition:  Home  Anticipated Discharge Services:  None  Anticipated Discharge DME:  None    Plan:  Pt is a 70-year-old female admitted to [facility] 10/21/20 with acute on chronic pancreatitis.     SW consult received for financial/insurance issues. Spoke with pt via phone. Pt states that her Creon costs $880/mo with her Medicare and [employer] insurance plans. She states she applied for a patient assistance program but was denied because of funds she has in an [place].    Spoke with pharmacy liaison Tosha. Per Tosha, if pt was denied from patient assistance program there unfortunately is no other assistance available. Tosha suggests having pt apply for assistance again and write a letter of financial hardship to see if it will get approved.     Relayed the above information to pt. Pt states she is also working with a patient advocate and gathering some information for her to see if there is any way to get assistance.    PADMINI Puga, [facility]    [facility]  Pager [phone]  Phone [phone]

## 2020-10-27 NOTE — DISCHARGE SUMMARY
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 No follow up labs or test are needed.   Follow up with Dr. Morton or other pancreas partner at [facility] (location   with clinic name or city), as recommended after your PD stent placement.   They will coordinate if any further x-rays or imaging is needed.     A referral was placed for a new liver doctor.     Appointments on [place] and/or [facility] (with [facility] or [facility]   provider or service). Call [phone] if you haven't heard regarding   these appointments within 7 days of discharge.          Follow up pending labs (iron panel, hepatitis serologies) with PCP and/or hepatology    Unresulted Labs Ordered in the Past 30 Days of this Admission     No orders found from 9/21/2020 to 10/22/2020.      These results will be followed up by hepatology.    Discharge Disposition   Discharged to home  Condition at discharge: Stable      Hospital Course   Amanda Sandoval was admitted on 10/21/2020 for acute on chronic pancreatitis in setting of PRSS1 gene positivity.  The following problems were addressed during her hospitalization:      Acute on chronic pancreatitis 2/2 PRSS1 gene positive  Acute on chronic pain  Admitted following failure of outpatient management of early acute on chronic pancreatitis.  Initial episode of acute  pancreatitis in February 2020 with multiple (11-12) recurrent episodes thereafter.  Lipase elevated at on current admission elevated to 1337 without significant lab abnormalities (creatinine at baseline (0.66 on admission), no electrolyte abnormalities, LFTs within normal limits).  Initially managed conservatively with IV fluid resuscitation, pain control, symptomatic management with antiemetics.  GI consulted with ERCP 10/26/20 with distal pancreatic duct dilation with stent placement x2. Patient to follow up with hepatology in 6 weeks for repeat ERCP with Dr. Flowers or Dr. Morton.  - GI follow-up for repeat ERCP in 6 weeks    Anemia, normocytic, improving  Dilutional.  - Iron panel pending at time of discharge  - Follow up with hepatology, PCP    Cirrhosis  As demonstrated on CT AP on 10/13.   - Follow up with hepatology as outpatient    Thrombocytopenia  Mild thrombocytopenia to 120s-130s over past several months. Possibly 2/2 cirrhosis.  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 in [place].    - On rivaroxaban 20 mg daily PTA-- held in advance of PD stenting with resumption on discharge on 10/27  Hx HTN  - Continue PTA metoprolol    Consultations This Hospital Stay   GI PANCREATICOBILIARY ADULT IP CONSULT  VASCULAR ACCESS CARE ADULT IP CONSULT  SOCIAL WORK IP CONSULT  SOCIAL WORK IP CONSULT  GI PANCREATICOBILIARY ADULT IP CONSULT    Code Status   Prior       The patient was discussed with Dr. Ball.    Jeanne Grullon MD  [address] [facility]  [address] [place] [address]  Phone: [phone]  Fax: [phone]  ______________________________________________________________________    Physical Exam   Vital Signs: Temp: 97.9  F (36.6  C) Temp src: Oral BP: 131/88 Pulse: 78   Resp: 22 SpO2: 98 % O2  Device: None (Room air) Oxygen Delivery: 1 LPM  Weight: 134 lbs 6.4 oz  Constitutional: awake, alert, cooperative, comfortable appearing, sitting upright in chair at bedside  Eyes: Lids and lashes normal, pupils equal, round and reactive to light, sclera clear, conjunctiva normal  ENT: Normocephalic, without obvious abnormality, atraumatic  Respiratory: No increased work of breathing. Breathing comfortably on room air. CAB with no wheezes or crackles  Cardiovascular: Normal apical impulse, regular rate and rhythm, normal S1 and S2, no S3 or S4, and no murmur noted  GI: No external abnormalities. Active BS. LUQ tender to palpation. No rebound tenderness, no gaurding.  Neurologic: Awake, alert, oriented to name, place and time.  Cranial nerves II-XII are grossly intact.       Primary Care Physician   Jl Johnson    Discharge Orders      GASTROENTEROLOGY ADULT REF CONSULT ONLY      Discharge Instructions    IF you were to take Creon, your dose would be: amylase-lipase-protease (CREON) 19400-80601 units CPEP per EC capsule. Take 2-3 with meals and 1-2 with snacks, up to 15 per day.     Adult [facility] Follow-up and recommended labs and tests    Follow up with primary care provider, Jl Johnson, within 2-3 weeks, for hospital follow- up. No follow up labs or test are needed.   Follow up with Dr. Morton or other pancreas partner at [facility] (location with clinic name or city), as recommended after your PD stent placement. They will coordinate if any further x-rays or imaging is needed.     A referral was placed for a new liver doctor.     Appointments on [place] and/or [facility] (with [facility] or [facility] provider or service). Call [phone] if you haven't heard regarding these appointments within 7 days of discharge.     Activity    Your activity upon discharge: activity as tolerated     Reason for your hospital stay    Dear Amanda Sandoval    You were hospitalized at [facility] with acute  on chronic pancreatitis and treated with gut rest, IV pain medications and fluids.  Over this hospitalization your symptoms improved and today you are ready to be discharged home after your pancreatic duct stent placement by GI.  You should continue to improve but if you develop fever, shortness of breath, worsening abdominal pain, light headedness, chest pain, severe vomiting or any other worrisome symptoms, please seek medical attention.    We are suggesting the following medication changes:  - none. You can restart your rivaroxaban on 10/27/20    Please set up an appointment with:  - PCP within a few weeks for hospital follow-up   - GI will help determine if further imaging is needed after the PD stent placement, and your next appointment with Dr. Morton.   - Liver specialist referral placed at discharge     It was a pleasure meeting with you today. Thank you for allowing me and my team the privilege of caring for you.  Please let us know if there is anything else we can do for you so that we can be sure you are leaving completely satisfied with your care experience.    Your hospital unit at the time of discharge is 5C so if you have any questions about these discharge instructions please call the [facility] hospital line at [phone] and ask to talk to a nurse on 5C.    Take care!  Marleen Smith MD  Department of Medicine  [facility]     Diet    Follow this diet upon discharge: regular as tolerated       Significant Results and Procedures   Most Recent 3 CBC's:  Recent Labs   Lab Test 10/27/20  0418 10/26/20  0502 10/25/20  0450   WBC 3.3* 3.1* 3.8*   HGB 10.8* 10.8* 11.1*   MCV 89 89 91    162 140*     Most Recent 3 BMP's:  Recent Labs   Lab Test 10/27/20  0418 10/26/20  0502 10/25/20  0450    142 142   POTASSIUM 4.2 3.9 3.6   CHLORIDE 108 111* 112*   CO2 24 25 25   BUN 11 8 7   CR 0.56 0.57 0.62   ANIONGAP 6 6 5   PASCUAL 9.9 10.1 9.9   * 86 92     Most Recent 2 LFT's:  Recent  Labs   Lab Test 10/27/20  0418 10/26/20  0502   AST 19 23   ALT 22 22   ALKPHOS 184* 180*   BILITOTAL 0.5 0.5       Discharge Medications   Current Discharge Medication List      CONTINUE these medications which have NOT CHANGED    Details   diphenhydrAMINE (BENADRYL) 25 MG capsule Take 25 mg by mouth nightly as needed for sleep       lipase-protease-amylase (ZENPEP) 10946-28453 units CPEP Take 1-2 with snacks, 2-3  with meals, up to 15 per day.  Qty: 450 capsule, Refills: 5    Associated Diagnoses: Chronic pancreatitis (H)      METOPROLOL SUCCINATE PO Take 25 mg by mouth daily       omeprazole (PRILOSEC) 20 MG DR capsule Take 20 mg by mouth daily       oxyCODONE IR (ROXICODONE) 10 MG tablet Take 5 mg by mouth every 4 hours as needed for severe pain      rivaroxaban ANTICOAGULANT (XARELTO) 20 MG TABS tablet Take 20 mg by mouth daily (with dinner)      senna-docusate (SENOKOT-S/PERICOLACE) 8.6-50 MG tablet Take 1 tablet by mouth daily as needed for constipation       traMADol (ULTRAM) 50 MG tablet Take 50 mg by mouth every 6 hours as needed for moderate pain       acetaminophen (TYLENOL) 500 MG tablet Take 500-1,000 mg by mouth every 6 hours as needed for mild pain       OXcarbazepine (TRILEPTAL) 150 MG tablet Take 150 mg by mouth as needed (Patient uses very infrequently (last 4 years ago) but keeps on hand for trigeminal neuralgia.)      prochlorperazine (COMPAZINE) 5 MG tablet Take 5-10 mg by mouth every 6 hours as needed for nausea or vomiting          STOP taking these medications       amylase-lipase-protease (CREON) 84398-00514 units CPEP per EC capsule Comments:   Reason for Stopping:             Allergies   Allergies   Allergen Reactions     Penicillins Rash     Erythromycin Diarrhea, GI Disturbance and Nausea     GI upset.       Oxycodone Other (See Comments)     Other reaction(s): Constipation  severe constipation  severe constipation  Constipation       Nickel Rash     Penicillin G Rash

## 2020-10-27 NOTE — ANESTHESIA POSTPROCEDURE EVALUATION
Anesthesia POST Procedure Evaluation    Patient: Amanda Sandoval   MRN:     [id number] Gender:   female   Age:    70 year old :      1950        Preoperative Diagnosis: GI (gastrointestinal bleed) [K92.2]   Procedure(s):  ENDOSCOPIC RETROGRADE CHOLANGIOPANCREATOGRAPHY WITH PANCREATIC DUCT BALLOON DILATION, AND STENT PLACEMENT.   Postop Comments: No value filed.     Anesthesia Type: General       Disposition: Admission   Postop Pain Control: Uneventful            Sign Out: Well controlled pain   PONV: No   Neuro/Psych: Uneventful            Sign Out: Acceptable/Baseline neuro status   Airway/Respiratory: Uneventful            Sign Out: Acceptable/Baseline resp. status   CV/Hemodynamics: Uneventful            Sign Out: Acceptable CV status   Other NRE: NONE   DID A NON-ROUTINE EVENT OCCUR? No         Last Anesthesia Record Vitals:  CRNA VITALS  10/26/2020 1723 - 10/26/2020 1823      10/26/2020             EKG:  Sinus rhythm          Last PACU Vitals:  Vitals Value Taken Time   /76 10/26/20 1915   Temp 36.5  C (97.7  F) 10/26/20 1830   Pulse 65 10/26/20 1919   Resp 20 10/26/20 1900   SpO2 96 % 10/26/20 1909   Temp src     NIBP 126/69 10/26/20 1759   Pulse 78 10/26/20 1759   SpO2 100 % 10/26/20 1759   Resp     Temp     Ht Rate     Temp 2     Vitals shown include unvalidated device data.      Electronically Signed By: Ed Graff MD, 2020, 7:20 PM

## 2020-10-27 NOTE — PROGRESS NOTES
"Pt discharged to: home  Via: personal vehicle  Accompanied by:   Belongings: sent with patient  Teaching: discharge instructions and medications reviewed    /88 (BP Location: Right arm)   Pulse 78   Temp 97.9  F (36.6  C) (Oral)   Resp 22   Ht 1.626 m (5' 4\")   Wt 61 kg (134 lb 6.4 oz)   SpO2 98%   BMI 23.07 kg/m          "

## 2020-10-27 NOTE — OR NURSING
"Patient nausea continued to \"come and go\". Dry heaving and emesis started again after pt. Request for ice chip. Emesis ensued, pt. Tachycardic and moaning. Declined pain medication and/or cool cloths. Nausea uncontrolled with all interventions.Text message sent to Dr. Dimas. \"Pt: [facility] PAC[facility]. Pt. continues to be VERY nauseated and vomiting green fluid. Received Zofran, Compazine, Reglan, Sea Bands, cool cloth. AVSS. Any other recommendations/thoughts?  Please let me know. Thank you. #[id number]\"    Surgeon returned page. Acknowledged nausea and ordered Versed 1 mg total. (2 doses Versed 0.5 mg IV each). No immediate concerns expressed from surgical / procedural aspect. Feels acute pancreatitis on chronic pancreatitis is the issue. AVSRS. Resting comfortably between cares. Nausea and emesis continued when awake. Dr. Graff returned to bedside to assess patient. Updated with new orders and clinical picture. Pain denied. Extreme nausea continued. OK to transfer to inpatient unit for close monitoring once nausea \"better.\"  "

## 2020-10-27 NOTE — TELEPHONE ENCOUNTER
Pt scheduled to see Dr. Chirinos 11/9/2020  Melissa SCHAFFER LPN  Hepatology Clinic    --------  [facility] Call Center    Phone Message    May a detailed message be left on voicemail: yes     Reason for Call: Other: Pt is requesting to be seen in-person. Dr. Morton wants pt to be seen within two weeks. Please call pt at: [phone]. Thanks!     Action Taken: Message routed to:  [facility] ([facility]): hep    Travel Screening: Not Applicable

## 2020-10-27 NOTE — PLAN OF CARE
"/60 (BP Location: Right arm)   Pulse 72   Temp 97.8  F (36.6  C) (Oral)   Resp 22   Ht 1.626 m (5' 4\")   Wt 60.8 kg (134 lb 1.6 oz)   SpO2 95%   BMI 23.02 kg/m      AVSS. Pt reports abd soreness managed w/tylenol w/relief. Up SBA to bathroom. Bed alarm on.  R and L PIV SLAyana Caparthur on for use of versed to control nausea after ERCP yesterday, see flowsheet. Denies nausea at this time. Lactic 0.7. No replacements needed. Plan to discharge today. Will continue with POC.   "

## 2020-10-27 NOTE — PLAN OF CARE
Patient went down for ERCP and pancreatic stents placed this afternoon.  She came back to  at around 9 pm.  Her late arrival back to  was do to uncontrolled nausea.  She had no nausea after returning to .   Patient sleeping soundly.  Problem: Adult Inpatient Plan of Care  Goal: Plan of Care Review  Outcome: No Change     Problem: Adult Inpatient Plan of Care  Goal: Patient-Specific Goal (Individualized)  Outcome: No Change     Problem: Adult Inpatient Plan of Care  Goal: Absence of Hospital-Acquired Illness or Injury  Outcome: No Change     Problem: Adult Inpatient Plan of Care  Goal: Optimal Comfort and Wellbeing  Outcome: No Change     Problem: Adult Inpatient Plan of Care  Goal: Readiness for Transition of Care  Outcome: No Change

## 2020-10-27 NOTE — PROGRESS NOTES
GASTROENTEROLOGY PROGRESS NOTE          ASSESSMENT AND RECOMMENDATIONS:   Assessment:  70 year old female with a h/o chronic pancreatitis related to PRSS1 mutation and portal vein thrombosis on rivaroxaban who is presenting w/ 4 days of LUQ pain and N/V, consistent with acute on chronic pancreatitis; BISAP 1. CT abd/pelvis c/w acute edematous pancreatitis on chronic pancreatitis w/ a peripancreatic fluid collection, chronically occluded portal veins, numerous portosystemic varices, and ascites.     S/p ERCP 10/26 with moderate-to-high grade short stricture in the distal dorsal pancreatic duct (ie., dorsal PD immediately upstream from minor papilla), worsened in severity compared to last ERCP (7/21/20), at which time pancreatic stents were removed to attempt a stent-free trial, s/p dilation and placement of 2 PD stents. Clinically doing well this AM.    Cirrhosis and thrombocytopenia are new (patient had a CT showing steatosis in June) - unclear etiology of cirrhosis (HCV negative 2019), MELD 7, ascites on imaging.      RECOMMENDATIONS:  - Diet as tolerated  - Ok to resume anticoagulation  - Schedule repeat ERCP in 6 weeks with Dr. Farnsworth  - With new finding of cirrhosis on imaging, will need outpatient hepatology follow-up. Would appreciate comment from hepatology perspective on potential candidacy for surgical therapy for chronic pancreatitis (such as [place] procedure) from a cirrhosis standpoint   - Please add on hepatitis A IgG, hepatitis B Core Ab, surface Ab, and surface Ag, iron panel/ferritin     GI will sign off    Thank you for involving us in this patient's care. Please do not hesitate to contact the GI service with any questions or concerns.     Pt care plan discussed with Dr. Farnsworth, GI staff physician.    This note was created with voice recognition software, and while reviewed for accuracy, typos may remain.    Braden Alvarenga MD  GI Fellow  P: [phone]        Interval History:  "  Mild 2-3/10 epigastric discomfort, consistent with prior abdominal discomfort. No fevers/chills. Denies N/V.          Medications:     Current Facility-Administered Medications   Medication     acetaminophen (TYLENOL) tablet 975 mg     amylase-lipase-protease (CREON 24) 09944-16022 units per EC capsule 48,000-72,000 Units     diphenhydrAMINE (BENADRYL) capsule 25 mg     HOLD MEDICATION (one time)     HYDROmorphone (DILAUDID) injection 0.2-0.4 mg     Lidocaine (LIDOCARE) 4 % Patch 1 patch    And     lidocaine patch in PLACE     lidocaine (LMX4) cream     lidocaine 1 % 0.1-1 mL     May continue current IV fluid if patient has IV fluids infusing until discharge.     melatonin tablet 1 mg     metoclopramide (REGLAN) tablet 5 mg    Or     metoclopramide (REGLAN) injection 5 mg     metoprolol succinate ER (TOPROL-XL) 24 hr tablet 25 mg     naloxone (NARCAN) injection 0.1-0.4 mg     oxyCODONE (ROXICODONE) tablet 5-10 mg     polyethylene glycol (MIRALAX) Packet 17 g     prochlorperazine (COMPAZINE) injection 5 mg    Or     prochlorperazine (COMPAZINE) tablet 5 mg    Or     prochlorperazine (COMPAZINE) suppository 12.5 mg     [Held by provider] rivaroxaban ANTICOAGULANT (XARELTO) tablet 20 mg     senna-docusate (SENOKOT-S/PERICOLACE) 8.6-50 MG per tablet 1 tablet     sennosides (SENOKOT) tablet 8.6 mg     sodium chloride (PF) 0.9% PF flush 3 mL     sodium chloride (PF) 0.9% PF flush 3 mL     sodium chloride (PF) 0.9% PF flush 3 mL        Physical Exam   Blood pressure 131/88, pulse 78, temperature 97.9  F (36.6  C), temperature source Oral, resp. rate 22, height 1.626 m (5' 4\"), weight 61 kg (134 lb 6.4 oz), SpO2 98 %.  Constitutional: cooperative, pleasant, not dyspneic/diaphoretic, no acute distress  Eyes: Sclera anicteric/injected  Ears/nose/mouth/throat:mucus membranes moist  CV: No edema  Respiratory: Unlabored breathing  Abd: Nondistended, minimally tender in epigastrium  Skin: warm, perfused, no jaundice  Neuro: AAO " x 3  Psych: Normal affect  MSK: No gross deformities    Data   Current Labs  CBC  Recent Labs   Lab 10/27/20  0418 10/26/20  0502 10/25/20  0450 10/24/20  0320   WBC 3.3* 3.1* 3.8* 4.4   RBC 3.82 3.86 3.84 3.76*   HGB 10.8* 10.8* 11.1* 10.7*   HCT 33.9* 34.4* 34.9* 34.3*   MCV 89 89 91 91   MCH 28.3 28.0 28.9 28.5   MCHC 31.9 31.4* 31.8 31.2*   RDW 13.3 13.6 13.6 13.7    162 140* 125*     BMP  Recent Labs   Lab 10/27/20  0418 10/26/20  0502 10/25/20  0450 10/24/20  0320    142 142 143   POTASSIUM 4.2 3.9 3.6 3.7   CHLORIDE 108 111* 112* 112*   CO2 24 25 25 26   ANIONGAP 6 6 5 6   * 86 92 84   BUN 11 8 7 8   CR 0.56 0.57 0.62 0.62   GFRESTIMATED >90 >90 >90 >90   GFRESTBLACK >90 >90 >90 >90   PASCUAL 9.9 10.1 9.9 9.3      INR  Recent Labs   Lab 10/26/20  1219 10/22/20  0547   INR 1.10 2.46*     Liver panel  Recent Labs   Lab 10/27/20  0418 10/26/20  0502 10/25/20  0450 10/24/20  0320   PROTTOTAL 6.2* 6.5* 6.5* 6.2*   ALBUMIN 2.8* 2.9* 2.9* 2.9*   BILITOTAL 0.5 0.5 0.8 1.3   ALKPHOS 184* 180* 149 126   AST 19 23 18 14   ALT 22 22 20 16       Imaging/procedures:  Reviewed in EMR

## 2020-10-28 NOTE — PROGRESS NOTES
[facility]: Post-Discharge Note  SITUATION                                                      Admission:    Admission Date: 10/21/20   Reason for Admission: Acute on chronic pancreatitis 2/2 PRSS1 gene positive  Discharge:   Discharge Date: 10/27/20  Discharge Diagnosis: Acute on chronic pancreatitis 2/2 PRSS1 gene positive  Discharge Service: Medicine and Pediatrics    BACKGROUND                                                      Amanda Sandoval is a 70 year old female with a past medical history of hereditary chronic pancreatitis, portal venous thrombosis, ERCP (with pancreatic stent placement and removal, and HTN who presents with the complaint of LUQ abdominal pain that radiates to the back. She had her first episode of pancreatitis in February of this year while in [place]. Her family has a long history pancreatitis with her father being diagnosed with pancreatitis and having recurrent attacks throughout his life. A doctor in [place] studied his family back in 1991 and diagnosed this being a hereditary pancreatitis. At a later date they performed genetic testing to confirm this within many of the family members. The patient reports that Dr. Morton follows approximately 25 family members who have this hereditary chronic pancreatitis.      Prior to February of this year she was previously healthy only with a history of hypertension. She had her first episode of acute pancreatitis in February. Since that time she had had approximately 11-12 episodes of acute pancreatitis. Each of these episodes typically last about 3 days and consists of extreme pain in the LUQ that radiates to her back. She does not return to a baseline of no pain between episodes. She complains of a constant dull ache between episodes. She is unaware of any triggers of her episodes and the only thing that helps is adequate hydration and pain management. She was found to have a portal vein thrombosis in April most  "likely due to the high inflammatory state from pancreatitis and was placed on a daily dose of Xarelto for anticoagulation.      She had an acute episode of pain similar to previous episodes on Monday, however, this time she reports that she vomited approximately 12 times. She had never previously vomited before during an acute pancreatitis episode. [facility] and received pain medication and IV fluids. She was told there were no beds available at the hospital or here at the [place] and was sent home after being stabilized with a prescription of oxycodone for pain management. She was doing well most of the day Tuesday until night time her pain intensified and became unbearable. She then presented to the [facility] ED this morning seeking help with pain management and stabilization of her condition. ROS positive for 30 pound weight loss since February, 12 episodes of vomiting Monday night, SOB due to pain with inspiration.    ASSESSMENT      Discharge Assessment  Patient reports symptoms are: Unchanged(\"Having some pain but no worse than usual\" per patient.)  Does the patient have all of their medications?: Yes  Does patient know what their new medications are for?: Not applicable  Does patient have a follow-up appointment scheduled?: Yes  Does patient have any other questions or concerns?: No    Post-op  Did the patient have surgery or a procedure: No  Fever: No  Chills: No  Eating & Drinking: eating and drinking without complaints/concerns  PO Intake: regular diet  Bowel Function: normal  Urinary Status: voiding without complaint/concerns    PLAN                                                      Outpatient Plan:      Follow up with primary care provider, Jl Johnson, within 2-3 weeks, for hospital follow- up. No follow up labs or test are needed.   Follow up with Dr. Morton or other pancreas partner at [facility] (location with clinic name or city), as recommended after your PD stent " placement. They will coordinate if any further x-rays or imaging is needed.      A referral was placed for a new liver doctor.     Future Appointments   Date Time Provider Department Center   11/16/2020 12:30 PM Nate Morton MD [facility]           [place] [facility]

## 2020-10-28 NOTE — PROGRESS NOTES
Addendum to Progress Note dated 10/26/20    # SIRS criteria met  No signs or symptoms of infection, likely 2/2 acute pancreatitis.  - Monitor for additional ssx of infection    Jeanne Grullon MD

## 2020-10-28 NOTE — PROGRESS NOTES
Addendum to Discharge Summary dated 10/27/20    # SIRS- without Identified Infection   No indication for antibiotics during hospitalization course.     Jeanne Grullon MD

## 2020-10-29 NOTE — TELEPHONE ENCOUNTER
Per inpatient team and Dr. Farnsworth      Procedure: ERCP   Physician:    Timin weeks   Location/anesthesia: General   Dx: Pancreatic stricture   Comments:   On rivaroxaban which will need to be held     And- hepatology for newly diagnosed cirrhosis, she;'s already reach out to them as she wants a virtual visit.     [facility]- still having trouble getting approved. They are working to get meds from .     Might be up for [place] in the future.     Maite Corona, RN Care Coordinator

## 2020-11-04 PROBLEM — K86.89 PANCREATIC DUCT STRICTURE: Status: ACTIVE | Noted: 2020-01-01

## 2020-11-04 NOTE — TELEPHONE ENCOUNTER
LVM for patient in regards to scheduling procedure with Dr. Osuna. Left direct line for patient to call to go over scheduling details.

## 2020-11-04 NOTE — TELEPHONE ENCOUNTER
Mo Conrad Shannon, LPN   Caller: Unspecified (Today, 10:28 AM)             I spoke to pt, she is now schedule 11/9 7:30 am      ------    Beckenbach,Melissa,LPN  Clinic Coordinators[facility]     Please schedule in person with Dr. Chirinos, no labs needed. Please send back encounter once scheduled.       --------  [facility] Call Center    Phone Message    May a detailed message be left on voicemail: yes     Reason for Call: Other: Pt requested to be seen in-person on 10/27/2020. Still hasn't heard anything. Dr. Morton wants pt to be seen within two weeks. Please call pt at: [phone]. Thanks!      Action Taken: Message routed to:  [facility] ([facility] hep    Travel Screening: Not Applicable

## 2020-11-04 NOTE — TELEPHONE ENCOUNTER
Spoke to patient in regards to scheduled procedure. Informed patient she is scheduled with Dr. Osuna on 11/30/2020 per her availability. Informed patient she will need an updated pre-op physical within 30 days of her procedure and a COVID-19 test within 96-72 hours. Patient stated she is going to have this done locally. Informed patient she will need a  and someone to monitor her for 24 hours after the procedure. Informed patient all scheduling details will be sent to her MyChart per her request.

## 2020-11-05 NOTE — TELEPHONE ENCOUNTER
RECORDS RECEIVED FROM: Internal   Appt Date: 11.09.2020   NOTES STATUS DETAILS   OFFICE NOTE from referring provider Internal 10.21.2020 Consult  Marleen Smith MD   OFFICE NOTES from other specialists N/A    DISCHARGE SUMMARY from hospital Internal 10.21.2020   MEDICATION LIST Internal / CE    LIVER BIOSPY (IF APPLICABLE)      PATHOLOGY REPORTS  N/A    IMAGING     ENDOSCOPY (IF AVAILABLE) Internal 06.04.2020   COLONOSCOPY (IF AVAILABLE) Internal 04.02.2020   ULTRASOUND LIVER N/A    CT OF ABDOMEN Inaternal 10.22.2020 CT ABDOMEN WO & W & PELVIS WO CONTRAST    MRI OF LIVER N/A    FIBROSCAN, US ELASTOGRAPHY, FIBROSIS SCAN, MR ELASTOGRAPHY N/A    LABS     HEPATIC PANEL (LIVER PANEL) N/A    BASIC METABOLIC PANEL N/A    COMPLETE METABOLIC PANEL Internal 10.27.2020   COMPLETE BLOOD COUNT (CBC) Internal 10.27.2020   INTERNATIONAL NORMALIZED RATIO (INR) Internal 10.26.2020   HEPATITIS C ANTIBODY Care Everywhere 10.08.2019   HEPATITIS C VIRAL LOAD/PCR N/A    HEPATITIS C GENOTYPE N/A    HEPATITIS B SURFACE ANTIGEN Internal 10.27.2020   HEPATITIS B SURFACE ANTIBODY Internal 10.27.2020   HEPATITIS B DNA QUANT LEVEL N/A    HEPATITIS B CORE ANTIBODY Internal 10.27.2020

## 2020-11-08 NOTE — PROGRESS NOTES
[facility] New Patient Visit    Date of Visit: November 8, 2020    Reason for referral: Cirrhosis on CT scan    Subjective: Ms. Sandoval is a 70 year old woman with a history of chronic pancreatitis s/p PRSS1 gene mutation, hepatic steatosis, who presents for evaluation of cirrhosis on CT scan.    She follows with Dr. Morton for her chronic pancreatitis. She has a strong family history of pancreatitis with many family members.     She first presented with clinical pancreatitis 2/2020 when she was in [place] ( and her live there in the winter). She had an ERCP with minor papillotomy. She was found to have a PVT 3/2020 at [facility] in [place] where she was admitted after driving back from [place]. She established with Dr. Morton, has had multiple ERCPs with completion of minor papillotomy, identification of ansa loop not pancreas divisum, biliary sphincterotomy, then dorsal duct stents. Dorsal duct stents were removed 7/2020.     She was most recently admitted the end of October 2020 for acute on chronic pancreatitis. She had a CT scan 10/22 for this that showed a severe atrophic pancreas with irregular dilation of the intrapancreatic duct, mild enhancement of residual pancreatic tissue and peripancreatic fluid and inflammation. Fluid and inflammation noted to extended to the periportal and perisplenic regions as well as a the left retroperitoneum > right retroperitoneum. There was no discrete pocket of fluid collection of pseudoaneurysm. Liver noted to be heterogenous with enlargement of the caudate lobe. Noted to have a few irregular hypodensities throughout the liver. Chronically occluded intrahepatic portal and main portal veins with numerous portosystemic varices. Splenomegaly noted. Mild mesenteric edema noted. Small volume ascites in the pelvis.  She has never had a paracentesis. On 10/21 she underwent an ERCP that showed a high grade short stricture in the distal dorsal  pancreatic duct, worse in severity compared to last ERCP 7/2020. This stricture was dilated and 2 pigtail stents were placed. Plan is for follow up ERCP in 6 weeks.     She denies a history of jaundice or liver disease. She has never had a liver biopsy. She had previous imaging (CT, MRCP, US) this year that has shown a normal liver or hepatic steatosis. She reports losing ~ 30 lbs this year related to eating less 2/2 discomfort from her pancreatitis. She has not drank alcohol since her diagnosis of pancreatitis, and denies a heavy use prior to this. She has started taking creon, which has helped.      She is taking rivaroxaban for her PVT - has been taking this since diagnosis in March.     Platelet count has fluctuated to mild thrombocytopenia this year, most recently  > 150.     ROS: 14 point ROS negative except for positives noted in HPI.    PMHx:  Past Medical History:   Diagnosis Date     Antiplatelet or antithrombotic long-term use      Arthritis      Cancer (H)      History of blood transfusion      History of ERCP     last stent placement via EUS/ERCP April 2020     HTN (hypertension)      Pancreatitis, chronic (H)      Portal vein thrombosis        PSHx:  Past Surgical History:   Procedure Laterality Date     ABDOMEN SURGERY       BACK SURGERY       BIOPSY       COLONOSCOPY       ENDOSCOPIC RETROGRADE CHOLANGIOPANCREATOGRAM N/A 4/23/2020    Procedure: ENDOSCOPIC RETROGRADE CHOLANGIOPANCREATOGRAPHY with Stent Exchange, Pancreatic Stent Placement, Biliary Duct Stent Placement, Pancreatic and Biliary Sphincterotomy, Pancreatic Sludge Removal;  Surgeon: Nate Morton MD;  Location: [facility] OR     ENDOSCOPIC RETROGRADE CHOLANGIOPANCREATOGRAM N/A 6/22/2020    Procedure: ENDOSCOPIC RETROGRADE CHOLANGIOPANCREATOGRAPHY WITH BILIARY SPHINCTEROTOMY AND STENT PLACEMENT, MINOR PAPILLOTOMY WITH STENT PLACEMENT AND DEBRIS REMOVAL AND VENTRAL PANCREATIC DUCT SPHINCTEROTOMY WITH STENT PLACEMENT;  Surgeon: Praveen  Nate Cole MD;  Location: [facility] OR     ENDOSCOPIC RETROGRADE CHOLANGIOPANCREATOGRAM N/A 10/26/2020    Procedure: ENDOSCOPIC RETROGRADE CHOLANGIOPANCREATOGRAPHY WITH PANCREATIC DUCT BALLOON DILATION, AND STENT PLACEMENT.;  Surgeon: Guru Amy Farnsworth MD;  Location: [facility] OR     ENDOSCOPIC RETROGRADE CHOLANGIOPANCREATOGRAPHY, EXCHANGE TUBE/STENT N/A 7/21/2020    Procedure: ENDOSCOPIC RETROGRADE CHOLANGIOPANCREATOGRAPHY with pancreatic stent exchange;  Surgeon: Nate Morton MD;  Location: [facility] OR     ENDOSCOPIC ULTRASOUND UPPER GASTROINTESTINAL TRACT (GI) N/A 4/23/2020    Procedure: Diagnostic ENDOSCOPIC ULTRASOUND, ESOPHAGOSCOPY / UPPER GASTROINTESTINAL TRACT (GI) with Stent Removal;  Surgeon: Braden Ruiz MD;  Location: [facility] OR     ESOPHAGOSCOPY, GASTROSCOPY, DUODENOSCOPY (EGD), COMBINED N/A 6/4/2020    Procedure: ESOPHAGOGASTRODUODENOSCOPY (EGD);  Surgeon: Nate Morton MD;  [facility]     ESOPHAGOSCOPY, GASTROSCOPY, DUODENOSCOPY (EGD), COMBINED N/A 6/4/2020    Procedure: Esophagogastroduodenoscopy, With Foreign Body Removal;  Surgeon: Nate Morton MD;  Location: [facility] GI     GYN SURGERY       HEAD & NECK SURGERY       ORTHOPEDIC SURGERY  2017    lumbar     ORTHOPEDIC SURGERY  2003    cervical       FamHx:  Family History   Problem Relation Age of Onset     Pancreatitis Father      Diabetes Father      Osteoporosis Mother      Thyroid Disease Mother      Obesity Mother      Coronary Artery Disease Maternal Grandfather      Diabetes Paternal Grandmother    Pancreatitis is multiple family members  Pancreatic cancer with metastasis to the liver, otherwise no liver disese    SocHx:  Social History     Socioeconomic History     Marital status:      Spouse name: Not on file     Number of children: Not on file     Years of education: Not on file     Highest education level: Not on file   Occupational History     Not on file   Social Needs     Financial resource  strain: Not on file     Food insecurity     Worry: Not on file     Inability: Not on file     Transportation needs     Medical: Not on file     Non-medical: Not on file   Tobacco Use     Smoking status: Never Smoker     Smokeless tobacco: Never Used   Substance and Sexual Activity     Alcohol use: Not Currently     Comment: drank socially before, nothing in 2020     Drug use: Never     Sexual activity: Yes     Partners: Male     Birth control/protection: Female Surgical   Lifestyle     Physical activity     Days per week: Not on file     Minutes per session: Not on file     Stress: Not on file   Relationships     Social connections     Talks on phone: Not on file     Gets together: Not on file     Attends Caodaism service: Not on file     Active member of club or organization: Not on file     Attends meetings of clubs or organizations: Not on file     Relationship status: Not on file     Intimate partner violence     Fear of current or ex partner: Not on file     Emotionally abused: Not on file     Physically abused: Not on file     Forced sexual activity: Not on file   Other Topics Concern     Parent/sibling w/ CABG, MI or angioplasty before 65F 55M? No   Social History Narrative     Not on file       Medications:  Current Outpatient Medications   Medication     acetaminophen (TYLENOL) 500 MG tablet     Ascorbic Acid (VITAMIN C) 500 MG CAPS     cholecalciferol (VITAMIN D3) 25 mcg (1000 units) capsule     diphenhydrAMINE (BENADRYL) 25 MG capsule     lipase-protease-amylase (ZENPEP) 95198-40136 units CPEP     METOPROLOL SUCCINATE PO     multivitamin  with lutein (OCUVITE WITH LTEIN) CAPS per capsule     omeprazole (PRILOSEC) 20 MG DR capsule     oxyCODONE IR (ROXICODONE) 10 MG tablet     rivaroxaban ANTICOAGULANT (XARELTO) 20 MG TABS tablet     senna-docusate (SENOKOT-S/PERICOLACE) 8.6-50 MG tablet     OXcarbazepine (TRILEPTAL) 150 MG tablet     prochlorperazine (COMPAZINE) 5 MG tablet     traMADol (ULTRAM) 50 MG  tablet     No current facility-administered medications for this visit.        Allergies:  Allergies   Allergen Reactions     Penicillins Rash     Erythromycin Diarrhea, GI Disturbance and Nausea     GI upset.       Oxycodone Other (See Comments)     Other reaction(s): Constipation  severe constipation  severe constipation  Constipation       Nickel Rash     Penicillin G Rash       Objective:  /76 (BP Location: Left arm, Patient Position: Sitting, Cuff Size: Adult Regular)   Pulse 68   Temp 98  F (36.7  C)   Wt 63.4 kg (139 lb 12.8 oz)   SpO2 100%   BMI 24.00 kg/m    Constitutional: pleasant woman in NAD  Eyes: non icteric  Respiratory: Normal respiratory excursion   MSK: normal range of motion of in extremities  Abd: Non distended, no appreciable ascites, non tender  Ext: edema at sock line  Skin: No jaundice  Psychiatric: normal mood and orientation    Labs:  Lab Results   Component Value Date    AST 19 10/27/2020     Lab Results   Component Value Date    ALT 22 10/27/2020     No results found for: BILICONJ   Lab Results   Component Value Date    BILITOTAL 0.5 10/27/2020     Lab Results   Component Value Date    ALBUMIN 2.8 10/27/2020     Lab Results   Component Value Date    PROTTOTAL 6.2 10/27/2020      Lab Results   Component Value Date    ALKPHOS 184 10/27/2020       Last Renal Panel:  Sodium   Date Value Ref Range Status   10/27/2020 138 133 - 144 mmol/L Final     Potassium   Date Value Ref Range Status   10/27/2020 4.2 3.4 - 5.3 mmol/L Final     Chloride   Date Value Ref Range Status   10/27/2020 108 94 - 109 mmol/L Final     Carbon Dioxide   Date Value Ref Range Status   10/27/2020 24 20 - 32 mmol/L Final     Anion Gap   Date Value Ref Range Status   10/27/2020 6 3 - 14 mmol/L Final     Glucose   Date Value Ref Range Status   10/27/2020 113 (H) 70 - 99 mg/dL Final     Urea Nitrogen   Date Value Ref Range Status   10/27/2020 11 7 - 30 mg/dL Final     Creatinine   Date Value Ref Range Status    10/27/2020 0.56 0.52 - 1.04 mg/dL Final     GFR Estimate   Date Value Ref Range Status   10/27/2020 >90 >60 mL/min/[1.73_m2] Final     Comment:     Non  GFR Calc  Starting 12/18/2018, serum creatinine based estimated GFR (eGFR) will be   calculated using the Chronic Kidney Disease Epidemiology Collaboration   (CKD-EPI) equation.       Calcium   Date Value Ref Range Status   10/27/2020 9.9 8.5 - 10.1 mg/dL Final     Albumin   Date Value Ref Range Status   10/27/2020 2.8 (L) 3.4 - 5.0 g/dL Final       Lab Results   Component Value Date    WBC 3.3 10/27/2020     Lab Results   Component Value Date    RBC 3.82 10/27/2020     Lab Results   Component Value Date    HGB 10.8 10/27/2020     Lab Results   Component Value Date    HCT 33.9 10/27/2020     Lab Results   Component Value Date    MCV 89 10/27/2020     Lab Results   Component Value Date    MCH 28.3 10/27/2020     Lab Results   Component Value Date    MCHC 31.9 10/27/2020     Lab Results   Component Value Date    RDW 13.3 10/27/2020     Lab Results   Component Value Date     10/27/2020       INR   Date Value Ref Range Status   10/26/2020 1.10 0.86 - 1.14 Final        MELD-Na score: 7 at 10/27/2020  4:18 AM  MELD score: 7 at 10/27/2020  4:18 AM  Calculated from:  Serum Creatinine: 0.56 mg/dL (Rounded to 1 mg/dL) at 10/27/2020  4:18 AM  Serum Sodium: 138 mmol/L (Rounded to 137 mmol/L) at 10/27/2020  4:18 AM  Total Bilirubin: 0.5 mg/dL (Rounded to 1 mg/dL) at 10/27/2020  4:18 AM  INR(ratio): 1.10 at 10/26/2020 12:19 PM  Age: 70 years 8 months    10/27/2020    Hepatitis B Core Ab, Sag NR  Hepatitis B SAb reactive and immune  Ferritin 54  TSat 11%    10/2019  HCV Ab HR    Imaging:    CT AP 10/22/2020    FINDINGS:     Severely atrophic pancreas with irregular dilatation of  intrapancreatic duct measuring up to 9 mm (series 7 image 146). There  is mild enhancement of the residual pancreatic tissue throughout the  gland associated with acute  peripancreatic fluid and inflammation.  This extends into the periportal and perisplenic regions. Fluid and  inflammation also extends into the left retroperitoneum greater than  right. No evidence of discrete pocket of fluid collection or  pseudoaneurysm.     Heterogeneous liver with few irregular hypodensity throughout the  liver. For example 1.2 x 1.7 hypodense subcapsular lesion in hepatic  segment 7 of the liver (series 9 image 31). Chronically occluded  intrahepatic portal and main portal veins associated with numerous  portosystemic varices in the papito hepatis collateral vessels in the  left upper quadrant. Gallbladder wall appears slightly and which  appears to be due to chronic portal venous changes and liver disease  with collateralization. This is similar in appearance to prior CT.  Splenomegaly round hypodensity in the anterior spleen measuring 7 mm,  previously 4 mm on 4/5/2020. Mild hypodense lesion in the upper pole  of the left kidney measuring up to 2.0 cm, unchanged. Additional too  small to characterize low-density lesion statistically likely cysts as  well. No hydronephrosis. Urinary bladder is unremarkable. Normal  pelvic organs.     Stomach is unremarkable. Mild thickening of the duodenal wall, likely  reactive.  Visualized small and large bowels are within normal limits.  Mild mesenteric edema. No retroperitoneal or mesenteric adenopathy.  Small volume of ascites mainly in the pelvis. There is some presacral  edema.     Soft Tissue/Osseous structures: No acute osseous or soft tissue  lesion.                                                                      IMPRESSION:  1. Acute interstitial edematous pancreatitis on chronic pancreatitis  associated with mild to moderate acute peripancreatic collection. No  evidence of drainable pocket of fluid collection or pseudoaneurysm  identified.  2. Hepatic cirrhosis with chronically occluded intrahepatic portal and  main portal veins associated with  numerous portosystemic varices in  the papito hepatis and upper abdomen. Ascites.  3. Mesenteric edema and reactive bowel change secondary to acute  Pancreatitis.    MRCP 3/7/2020    1.  Geographic fatty infiltration of the liver, greatest involving the posterior right hepatic lobe, also showing variable enhancement, series 1601 images 23-50.  No concerning liver lesion.    2.  Normal gallbladder and biliary tree.    3.  Dilated pancreatic duct and mild pancreatic parenchymal volume loss.  Currently there is no significant pancreatitis, and the pancreas shows homogeneous enhancement.    RUQ US 2/2020      FINDINGS:    Normal liver echotexture without focal lesions.    Small amount of gallbladder sludge is likely present.  Minimal gallbladder wall thickening up to 3 mm...      No evidence of intrahepatic or extrahepatic biliary ductal dilatation.  The common duct measures 8.2 mm.    Mild dilation of the main pancreatic duct up to 4 mm.  In the region of the pancreatic body is a fluid collection measuring 2 x 1.5 x 2.5 cm.    The spleen is of normal size and echotexture.    The right kidney measures 10.2 cm in length and the left kidney measures 10.7 cm in length.  Echotexture is normal.  No evidence of hydronephrosis, calculi, or masses.    The abdominal aorta is of normal caliber.    The IVC appears patent.    No free fluid is seen in the upper abdomen.    Incidentally noted are small bilateral pleural effusions.      Endoscopy: Reviewed in EHR    Assessment/Plan:  Ms. Sandoval is a 70 year old woman with a history of chronic pancreatitis s/p PRSS1 gene mutation, hepatic steatosis, who presents for evaluation of cirrhosis on CT scan.    On review of her imaging, there is note that her liver is heterogenous with some enlargement of the caudate lobe after discussion with the Radiologist, but no clear cirrhotic liver contour. She has an extensive chronic PVT with portal HTN (splenomegaly and extensive abdominal  collaterals). PVT is extensive and has been attributed to her pancreatitis in the past. CT also showed fluid related to her acute pancreatitis as well as small volume fluid in the pelvis. Her previous imaging has not shown clear cirrhosis - showed hepatic steatosis at times. Her portal hypertension changes could be 2/2 her chronic PVT. It is unusual to see ascites with only a PVT (without cirrhosis), but potentially could be related to her pancreatitis or small amount of physiologic free fluid. She does have signs of chronic liver disease on her imaging, and with her history of steatosis, this is likely 2/2 NAFLD. She does not drink alcohol and hepatitis testing is negative. She has lost weight related to her pancreatitis. Recommend further non-invasive work up to characterize her fibrosis stage.     - Recommend MR elastography to assess for advanced fibrosis. Would recommend she get this done before she goes to [place] in the Winter. May not be able to go this year related to COVID and her pancreatitis. Based on this, can better inform discussions regarding risk of surgery for pancreatitis in the future and need for further cirrhosis care.   - Continue to avoid alcohol  - Discussed treatment for NAFLD, which includes 5-10% weight loss (she has already lost weight this year related to decreased PO intake with pancreatitis), exercise  - Continue anticoagulation for her PVT    RTC in the Spring once she returns from [place]. Recommend MRE and follow up plan prior to her leaving [place].     Nemo Chirinos MD MSc    Approximately 31 minutes of non face-to-face time were spent in review of the patient's medical record to date.  This included review of previous: clinic visits, hospital records, lab results, imaging studies, and procedural documentation.  The findings from this review are summarized in the above note.

## 2020-11-09 NOTE — LETTER
11/9/2020         RE: Amanda Sandoval  [address]  [facility] [address]        Dear Colleague,    Thank you for referring your patient, Amanda Sandoval, to the [facility] [place]. Please see a copy of my visit note below.    [facility] New Patient Visit    Date of Visit: November 8, 2020    Reason for referral: Cirrhosis on CT scan    Subjective: Ms. Sandoval is a 70 year old woman with a history of chronic pancreatitis s/p PRSS1 gene mutation, hepatic steatosis, who presents for evaluation of cirrhosis on CT scan.    She follows with Dr. Morton for her chronic pancreatitis. She has a strong family history of pancreatitis with many family members.     She first presented with clinical pancreatitis 2/2020 when she was in [place] ( and her live there in the winter). She had an ERCP with minor papillotomy. She was found to have a PVT 3/2020 at [facility] in [place] where she was admitted after driving back from [place]. She established with Dr. Morton, has had multiple ERCPs with completion of minor papillotomy, identification of ansa loop not pancreas divisum, biliary sphincterotomy, then dorsal duct stents. Dorsal duct stents were removed 7/2020.     She was most recently admitted the end of October 2020 for acute on chronic pancreatitis. She had a CT scan 10/22 for this that showed a severe atrophic pancreas with irregular dilation of the intrapancreatic duct, mild enhancement of residual pancreatic tissue and peripancreatic fluid and inflammation. Fluid and inflammation noted to extended to the periportal and perisplenic regions as well as a the left retroperitoneum > right retroperitoneum. There was no discrete pocket of fluid collection of pseudoaneurysm. Liver noted to be heterogenous with enlargement of the caudate lobe. Noted to have a few irregular hypodensities throughout the liver. Chronically occluded intrahepatic portal and main  portal veins with numerous portosystemic varices. Splenomegaly noted. Mild mesenteric edema noted. Small volume ascites in the pelvis.  She has never had a paracentesis. On 10/21 she underwent an ERCP that showed a high grade short stricture in the distal dorsal pancreatic duct, worse in severity compared to last ERCP 7/2020. This stricture was dilated and 2 pigtail stents were placed. Plan is for follow up ERCP in 6 weeks.     She denies a history of jaundice or liver disease. She has never had a liver biopsy. She had previous imaging (CT, MRCP, US) this year that has shown a normal liver or hepatic steatosis. She reports losing ~ 30 lbs this year related to eating less 2/2 discomfort from her pancreatitis. She has not drank alcohol since her diagnosis of pancreatitis, and denies a heavy use prior to this. She has started taking creon, which has helped.      She is taking rivaroxaban for her PVT - has been taking this since diagnosis in March.     Platelet count has fluctuated to mild thrombocytopenia this year, most recently  > 150.     ROS: 14 point ROS negative except for positives noted in HPI.    PMHx:  Past Medical History:   Diagnosis Date     Antiplatelet or antithrombotic long-term use      Arthritis      Cancer (H)      History of blood transfusion      History of ERCP     last stent placement via EUS/ERCP April 2020     HTN (hypertension)      Pancreatitis, chronic (H)      Portal vein thrombosis        PSHx:  Past Surgical History:   Procedure Laterality Date     ABDOMEN SURGERY       BACK SURGERY       BIOPSY       COLONOSCOPY       ENDOSCOPIC RETROGRADE CHOLANGIOPANCREATOGRAM N/A 4/23/2020    Procedure: ENDOSCOPIC RETROGRADE CHOLANGIOPANCREATOGRAPHY with Stent Exchange, Pancreatic Stent Placement, Biliary Duct Stent Placement, Pancreatic and Biliary Sphincterotomy, Pancreatic Sludge Removal;  Surgeon: Nate Morton MD;  Location: [facility] OR     ENDOSCOPIC RETROGRADE CHOLANGIOPANCREATOGRAM N/A  6/22/2020    Procedure: ENDOSCOPIC RETROGRADE CHOLANGIOPANCREATOGRAPHY WITH BILIARY SPHINCTEROTOMY AND STENT PLACEMENT, MINOR PAPILLOTOMY WITH STENT PLACEMENT AND DEBRIS REMOVAL AND VENTRAL PANCREATIC DUCT SPHINCTEROTOMY WITH STENT PLACEMENT;  Surgeon: Nate Morton MD;  Location: [facility] OR     ENDOSCOPIC RETROGRADE CHOLANGIOPANCREATOGRAM N/A 10/26/2020    Procedure: ENDOSCOPIC RETROGRADE CHOLANGIOPANCREATOGRAPHY WITH PANCREATIC DUCT BALLOON DILATION, AND STENT PLACEMENT.;  Surgeon: Guru Amy Farnsworth MD;  Location: [facility] OR     ENDOSCOPIC RETROGRADE CHOLANGIOPANCREATOGRAPHY, EXCHANGE TUBE/STENT N/A 7/21/2020    Procedure: ENDOSCOPIC RETROGRADE CHOLANGIOPANCREATOGRAPHY with pancreatic stent exchange;  Surgeon: Nate Morton MD;  Location: [facility] OR     ENDOSCOPIC ULTRASOUND UPPER GASTROINTESTINAL TRACT (GI) N/A 4/23/2020    Procedure: Diagnostic ENDOSCOPIC ULTRASOUND, ESOPHAGOSCOPY / UPPER GASTROINTESTINAL TRACT (GI) with Stent Removal;  Surgeon: Braden Ruiz MD;  Location: [facility] OR     ESOPHAGOSCOPY, GASTROSCOPY, DUODENOSCOPY (EGD), COMBINED N/A 6/4/2020    Procedure: ESOPHAGOGASTRODUODENOSCOPY (EGD);  Surgeon: Nate Morton MD;  Location: [facility] GI     ESOPHAGOSCOPY, GASTROSCOPY, DUODENOSCOPY (EGD), COMBINED N/A 6/4/2020    Procedure: Esophagogastroduodenoscopy, With Foreign Body Removal;  Surgeon: Nate Morton MD;  Location: [facility] GI     GYN SURGERY       HEAD & NECK SURGERY       ORTHOPEDIC SURGERY  2017    lumbar     ORTHOPEDIC SURGERY  2003    cervical       FamHx:  Family History   Problem Relation Age of Onset     Pancreatitis Father      Diabetes Father      Osteoporosis Mother      Thyroid Disease Mother      Obesity Mother      Coronary Artery Disease Maternal Grandfather      Diabetes Paternal Grandmother    Pancreatitis is multiple family members  Pancreatic cancer with metastasis to the liver, otherwise no liver disese    SocHx:  Social History      Socioeconomic History     Marital status:      Spouse name: Not on file     Number of children: Not on file     Years of education: Not on file     Highest education level: Not on file   Occupational History     Not on file   Social Needs     Financial resource strain: Not on file     Food insecurity     Worry: Not on file     Inability: Not on file     Transportation needs     Medical: Not on file     Non-medical: Not on file   Tobacco Use     Smoking status: Never Smoker     Smokeless tobacco: Never Used   Substance and Sexual Activity     Alcohol use: Not Currently     Comment: drank socially before, nothing in 2020     Drug use: Never     Sexual activity: Yes     Partners: Male     Birth control/protection: Female Surgical   Lifestyle     Physical activity     Days per week: Not on file     Minutes per session: Not on file     Stress: Not on file   Relationships     Social connections     Talks on phone: Not on file     Gets together: Not on file     Attends Scientologist service: Not on file     Active member of club or organization: Not on file     Attends meetings of clubs or organizations: Not on file     Relationship status: Not on file     Intimate partner violence     Fear of current or ex partner: Not on file     Emotionally abused: Not on file     Physically abused: Not on file     Forced sexual activity: Not on file   Other Topics Concern     Parent/sibling w/ CABG, MI or angioplasty before 65F 55M? No   Social History Narrative     Not on file       Medications:  Current Outpatient Medications   Medication     acetaminophen (TYLENOL) 500 MG tablet     Ascorbic Acid (VITAMIN C) 500 MG CAPS     cholecalciferol (VITAMIN D3) 25 mcg (1000 units) capsule     diphenhydrAMINE (BENADRYL) 25 MG capsule     lipase-protease-amylase (ZENPEP) 49977-75167 units CPEP     METOPROLOL SUCCINATE PO     multivitamin  with lutein (OCUVITE WITH LTEIN) CAPS per capsule     omeprazole (PRILOSEC) 20 MG DR capsule      oxyCODONE IR (ROXICODONE) 10 MG tablet     rivaroxaban ANTICOAGULANT (XARELTO) 20 MG TABS tablet     senna-docusate (SENOKOT-S/PERICOLACE) 8.6-50 MG tablet     OXcarbazepine (TRILEPTAL) 150 MG tablet     prochlorperazine (COMPAZINE) 5 MG tablet     traMADol (ULTRAM) 50 MG tablet     No current facility-administered medications for this visit.        Allergies:  Allergies   Allergen Reactions     Penicillins Rash     Erythromycin Diarrhea, GI Disturbance and Nausea     GI upset.       Oxycodone Other (See Comments)     Other reaction(s): Constipation  severe constipation  severe constipation  Constipation       Nickel Rash     Penicillin G Rash       Objective:  /76 (BP Location: Left arm, Patient Position: Sitting, Cuff Size: Adult Regular)   Pulse 68   Temp 98  F (36.7  C)   Wt 63.4 kg (139 lb 12.8 oz)   SpO2 100%   BMI 24.00 kg/m    Constitutional: pleasant woman in NAD  Eyes: non icteric  Respiratory: Normal respiratory excursion   MSK: normal range of motion of in extremities  Abd: Non distended, no appreciable ascites, non tender  Ext: edema at sock line  Skin: No jaundice  Psychiatric: normal mood and orientation    Labs:  Lab Results   Component Value Date    AST 19 10/27/2020     Lab Results   Component Value Date    ALT 22 10/27/2020     No results found for: BILICONJ   Lab Results   Component Value Date    BILITOTAL 0.5 10/27/2020     Lab Results   Component Value Date    ALBUMIN 2.8 10/27/2020     Lab Results   Component Value Date    PROTTOTAL 6.2 10/27/2020      Lab Results   Component Value Date    ALKPHOS 184 10/27/2020       Last Renal Panel:  Sodium   Date Value Ref Range Status   10/27/2020 138 133 - 144 mmol/L Final     Potassium   Date Value Ref Range Status   10/27/2020 4.2 3.4 - 5.3 mmol/L Final     Chloride   Date Value Ref Range Status   10/27/2020 108 94 - 109 mmol/L Final     Carbon Dioxide   Date Value Ref Range Status   10/27/2020 24 20 - 32 mmol/L Final     Anion Gap   Date  Value Ref Range Status   10/27/2020 6 3 - 14 mmol/L Final     Glucose   Date Value Ref Range Status   10/27/2020 113 (H) 70 - 99 mg/dL Final     Urea Nitrogen   Date Value Ref Range Status   10/27/2020 11 7 - 30 mg/dL Final     Creatinine   Date Value Ref Range Status   10/27/2020 0.56 0.52 - 1.04 mg/dL Final     GFR Estimate   Date Value Ref Range Status   10/27/2020 >90 >60 mL/min/[1.73_m2] Final     Comment:     Non  GFR Calc  Starting 12/18/2018, serum creatinine based estimated GFR (eGFR) will be   calculated using the Chronic Kidney Disease Epidemiology Collaboration   (CKD-EPI) equation.       Calcium   Date Value Ref Range Status   10/27/2020 9.9 8.5 - 10.1 mg/dL Final     Albumin   Date Value Ref Range Status   10/27/2020 2.8 (L) 3.4 - 5.0 g/dL Final       Lab Results   Component Value Date    WBC 3.3 10/27/2020     Lab Results   Component Value Date    RBC 3.82 10/27/2020     Lab Results   Component Value Date    HGB 10.8 10/27/2020     Lab Results   Component Value Date    HCT 33.9 10/27/2020     Lab Results   Component Value Date    MCV 89 10/27/2020     Lab Results   Component Value Date    MCH 28.3 10/27/2020     Lab Results   Component Value Date    MCHC 31.9 10/27/2020     Lab Results   Component Value Date    RDW 13.3 10/27/2020     Lab Results   Component Value Date     10/27/2020       INR   Date Value Ref Range Status   10/26/2020 1.10 0.86 - 1.14 Final        MELD-Na score: 7 at 10/27/2020  4:18 AM  MELD score: 7 at 10/27/2020  4:18 AM  Calculated from:  Serum Creatinine: 0.56 mg/dL (Rounded to 1 mg/dL) at 10/27/2020  4:18 AM  Serum Sodium: 138 mmol/L (Rounded to 137 mmol/L) at 10/27/2020  4:18 AM  Total Bilirubin: 0.5 mg/dL (Rounded to 1 mg/dL) at 10/27/2020  4:18 AM  INR(ratio): 1.10 at 10/26/2020 12:19 PM  Age: 70 years 8 months    10/27/2020    Hepatitis B Core Ab, Sag NR  Hepatitis B SAb reactive and immune  Ferritin 54  TSat 11%    10/2019  HCV Ab  HR    Imaging:    CT AP 10/22/2020    FINDINGS:     Severely atrophic pancreas with irregular dilatation of  intrapancreatic duct measuring up to 9 mm (series 7 image 146). There  is mild enhancement of the residual pancreatic tissue throughout the  gland associated with acute peripancreatic fluid and inflammation.  This extends into the periportal and perisplenic regions. Fluid and  inflammation also extends into the left retroperitoneum greater than  right. No evidence of discrete pocket of fluid collection or  pseudoaneurysm.     Heterogeneous liver with few irregular hypodensity throughout the  liver. For example 1.2 x 1.7 hypodense subcapsular lesion in hepatic  segment 7 of the liver (series 9 image 31). Chronically occluded  intrahepatic portal and main portal veins associated with numerous  portosystemic varices in the papito hepatis collateral vessels in the  left upper quadrant. Gallbladder wall appears slightly and which  appears to be due to chronic portal venous changes and liver disease  with collateralization. This is similar in appearance to prior CT.  Splenomegaly round hypodensity in the anterior spleen measuring 7 mm,  previously 4 mm on 4/5/2020. Mild hypodense lesion in the upper pole  of the left kidney measuring up to 2.0 cm, unchanged. Additional too  small to characterize low-density lesion statistically likely cysts as  well. No hydronephrosis. Urinary bladder is unremarkable. Normal  pelvic organs.     Stomach is unremarkable. Mild thickening of the duodenal wall, likely  reactive.  Visualized small and large bowels are within normal limits.  Mild mesenteric edema. No retroperitoneal or mesenteric adenopathy.  Small volume of ascites mainly in the pelvis. There is some presacral  edema.     Soft Tissue/Osseous structures: No acute osseous or soft tissue  lesion.                                                                      IMPRESSION:  1. Acute interstitial edematous pancreatitis on  chronic pancreatitis  associated with mild to moderate acute peripancreatic collection. No  evidence of drainable pocket of fluid collection or pseudoaneurysm  identified.  2. Hepatic cirrhosis with chronically occluded intrahepatic portal and  main portal veins associated with numerous portosystemic varices in  the papito hepatis and upper abdomen. Ascites.  3. Mesenteric edema and reactive bowel change secondary to acute  Pancreatitis.    MRCP 3/7/2020    1.  Geographic fatty infiltration of the liver, greatest involving the posterior right hepatic lobe, also showing variable enhancement, series 1601 images 23-50.  No concerning liver lesion.    2.  Normal gallbladder and biliary tree.    3.  Dilated pancreatic duct and mild pancreatic parenchymal volume loss.  Currently there is no significant pancreatitis, and the pancreas shows homogeneous enhancement.    RUQ US 2/2020      FINDINGS:    Normal liver echotexture without focal lesions.    Small amount of gallbladder sludge is likely present.  Minimal gallbladder wall thickening up to 3 mm...      No evidence of intrahepatic or extrahepatic biliary ductal dilatation.  The common duct measures 8.2 mm.    Mild dilation of the main pancreatic duct up to 4 mm.  In the region of the pancreatic body is a fluid collection measuring 2 x 1.5 x 2.5 cm.    The spleen is of normal size and echotexture.    The right kidney measures 10.2 cm in length and the left kidney measures 10.7 cm in length.  Echotexture is normal.  No evidence of hydronephrosis, calculi, or masses.    The abdominal aorta is of normal caliber.    The IVC appears patent.    No free fluid is seen in the upper abdomen.    Incidentally noted are small bilateral pleural effusions.      Endoscopy: Reviewed in EHR    Assessment/Plan:  Ms. Sandoval is a 70 year old woman with a history of chronic pancreatitis s/p PRSS1 gene mutation, hepatic steatosis, who presents for evaluation of cirrhosis on CT scan.    On  review of her imaging, there is note that her liver is heterogenous with some enlargement of the caudate lobe after discussion with the Radiologist, but no clear cirrhotic liver contour. She has an extensive chronic PVT with portal HTN (splenomegaly and extensive abdominal collaterals). PVT is extensive and has been attributed to her pancreatitis in the past. CT also showed fluid related to her acute pancreatitis as well as small volume fluid in the pelvis. Her previous imaging has not shown clear cirrhosis - showed hepatic steatosis at times. Her portal hypertension changes could be 2/2 her chronic PVT. It is unusual to see ascites with only a PVT (without cirrhosis), but potentially could be related to her pancreatitis or small amount of physiologic free fluid. She does have signs of chronic liver disease on her imaging, and with her history of steatosis, this is likely 2/2 NAFLD. She does not drink alcohol and hepatitis testing is negative. She has lost weight related to her pancreatitis. Recommend further non-invasive work up to characterize her fibrosis stage.     - Recommend MR elastography to assess for advanced fibrosis. Would recommend she get this done before she goes to [place] in the Winter. May not be able to go this year related to COVID and her pancreatitis. Based on this, can better inform discussions regarding risk of surgery for pancreatitis in the future and need for further cirrhosis care.   - Continue to avoid alcohol  - Discussed treatment for NAFLD, which includes 5-10% weight loss (she has already lost weight this year related to decreased PO intake with pancreatitis), exercise  - Continue anticoagulation for her PVT    RTC in the Spring once she returns from [place]. Recommend MRE and follow up plan prior to her leaving [place].     Nemo Chirinos MD MSc    Approximately 31 minutes of non face-to-face time were spent in review of the patient's medical record to date.  This included review of previous:  clinic visits, hospital records, lab results, imaging studies, and procedural documentation.  The findings from this review are summarized in the above note.    Again, thank you for allowing me to participate in the care of your patient.        Sincerely,        Nemo Chirinos MD

## 2020-11-09 NOTE — NURSING NOTE
Chief Complaint   Patient presents with     New Patient     general liver     /76 (BP Location: Left arm, Patient Position: Sitting, Cuff Size: Adult Regular)   Pulse 68   Temp 98  F (36.7  C)   Wt 63.4 kg (139 lb 12.8 oz)   SpO2 100%   BMI 24.00 kg/m        Patient endorses abdominal discomfort.  She does admit to feeling a bit depressed.    Demond Yang, EMT

## 2020-11-24 NOTE — TELEPHONE ENCOUNTER
Called to remind patient to hold Xerelto for 2 days prior to procedure scheduled on 11/30. Pt voiced understanding.     ML

## 2021-01-04 ENCOUNTER — HEALTH MAINTENANCE LETTER (OUTPATIENT)
Age: 71
End: 2021-01-04

## 2021-01-19 ENCOUNTER — TELEPHONE (OUTPATIENT)
Dept: GASTROENTEROLOGY | Facility: CLINIC | Age: 71
End: 2021-01-19

## 2021-01-19 NOTE — TELEPHONE ENCOUNTER
Returned call, expressed condolences over loss of Amanda. Form sent to records per policy to change patient status.    Maite Corona RN Care Coordinator

## 2021-01-19 NOTE — TELEPHONE ENCOUNTER
[facility] Call Center    Phone Message    May a detailed message be left on voicemail: yes     Reason for Call: Other: Patient's , Zohaib, called as he received a letter to schedule a follow up for Amanda with Dr. Morton; however, patient passed on 12-4-20.  Please stop sending letters.  Thank you!     (NOTE:  I have emailed HIM to correct patient's records.)    Action Taken: Message routed to:  [facility] Damian Powers Team [facility]    Travel Screening: Not Applicable

## 2021-06-01 VITALS — HEIGHT: 65 IN | WEIGHT: 160 LBS | BODY MASS INDEX: 26.66 KG/M2

## 2021-06-01 VITALS — BODY MASS INDEX: 27.44 KG/M2 | HEIGHT: 65 IN | WEIGHT: 164.68 LBS

## 2021-06-01 NOTE — TELEPHONE ENCOUNTER
"Amanda Sandoval is status post far lateral foraminotomy and discectomy at L4-5 on the left on 11/28/2018 by Dr. Tirso Chun.  Last seen in clinic on 12/21/2018 for staples (no post-op follow up since she went to [place]).  Today she c/o left \"sciatic\" pain associated with numbness in her foot. No weakness. Will get an updated MRI prior to appt in clinic with Dr. Chun.  Ange Ford RN, CNRN    "

## 2021-06-01 NOTE — TELEPHONE ENCOUNTER
Patient is calling because she is still having symptoms about 10 months after sx. She would like to discuss what next steps may be. Please call Amanda daniel @ [phone]

## 2021-06-02 VITALS — HEIGHT: 65 IN | BODY MASS INDEX: 27.32 KG/M2 | WEIGHT: 164 LBS

## 2021-06-02 VITALS — WEIGHT: 164 LBS | HEIGHT: 65 IN | BODY MASS INDEX: 27.32 KG/M2

## 2021-06-02 VITALS — BODY MASS INDEX: 27.04 KG/M2 | HEIGHT: 65 IN | WEIGHT: 162.31 LBS

## 2021-06-02 NOTE — PROGRESS NOTES
Patient here to follow up on imaging. Today she states she has left sided pain, numbness and tinging that travels from lumbar down to her foot.  Clara Couch, BERT, 2:31 PM

## 2021-06-02 NOTE — PROGRESS NOTES
The patient had a far lateral discectomy L4-5 on the left by me November 2018.  Since surgery she has had low back pain with pain in the left butt going down the left leg to the left ankle.  It goes into the anterior shin.  Not into the foot.  She cannot sleep on her back or on her left side.  She also complains of weakness and numbness and tingling in the left leg.   She does not have right leg symptoms.   She had an MRI.   I would like to get a myelogram CT and then compare the pictures of the MRI to the myelogram CT to see if we can determine why she has the pain.  I did show the MRI pictures to the patient and her .  Total time 15 minutes, more than 50% spent counseling and/or coordinating care.

## 2021-06-02 NOTE — TELEPHONE ENCOUNTER
Patient calling stating she had a myelogram done and is wanting to know the results. She doesn't know if we were going to give them to her over the phone or if she needed to schedule an appointment. Please advise [phone]

## 2021-06-02 NOTE — PROCEDURES
Interventional Neuroradiology    Procedure:  Lumbar myelogram    Radiologist:  Florentino Hanks    Fluoro Time:  1.1 minutes    Number of Images:  3    Complications:  None    Contrast:  15 ml Omni 180    Specimens:  none    EBL: Minimal      Preliminary Findings (See dictation for full detail)  1. LP via L3-4 puncture, 15 ml Omni 180 instilled intrathecally  2. No myelographic, lumbar spinal canal largely widely patent, though some delay of contrast traversing caudally into sacrum, suggesting potentially some narrowing at L5-S1, see CT results    Assess/Plan:  Bedrest for 2 hours   See CT results    Florentino Hanks

## 2021-06-03 VITALS
DIASTOLIC BLOOD PRESSURE: 78 MMHG | OXYGEN SATURATION: 98 % | HEART RATE: 68 BPM | HEIGHT: 65 IN | WEIGHT: 162 LBS | BODY MASS INDEX: 26.99 KG/M2 | SYSTOLIC BLOOD PRESSURE: 135 MMHG

## 2021-06-03 VITALS
WEIGHT: 162 LBS | HEIGHT: 65 IN | DIASTOLIC BLOOD PRESSURE: 71 MMHG | HEART RATE: 70 BPM | OXYGEN SATURATION: 98 % | BODY MASS INDEX: 26.99 KG/M2 | SYSTOLIC BLOOD PRESSURE: 134 MMHG

## 2021-06-03 NOTE — PROGRESS NOTES
Patient had a far lateral discectomy L4-5 on the left November 2018.  She still has left leg pain and burning.  She had a postop MRI and myelogram CT.  Reviewed them with the radiologist.  She has a widely patent foramen and some scar in the foramen, around the nerve, but no residual or recurrent disc herniation.  I showed her the pictures.  Plan neurology consult and EMG to see if we can come up with a reason for her leg pain and anything to do about it.  May be gabapentin.  She is satisfied with the plan.  Total time 15 minutes, more than 50% spent counseling and/or coordinating care.

## 2021-06-04 VITALS — BODY MASS INDEX: 26.99 KG/M2 | WEIGHT: 162 LBS | HEIGHT: 65 IN

## 2021-06-04 NOTE — PATIENT INSTRUCTIONS - HE
POST PROCEDURE INSTRUCTIONS  PROCEDURE DONE TODAY: LEFT L3-4 TRANSFORAMINAL EPIDURAL STEROID INJECTION    Rest today. Resume activity tomorrow as able.  Patient demonstrates the ability to ambulate independently with a steady gait at the time of discharge.      It is not unusual to have a temporary increase in your pain after a procedure. You can ice the area 24-48 hrs. 15 min at a time.      You received a steroid injection. This medication can take 2-7 days to take effect. Some temporary side effects include:    Heartburn    Flushed-red face    Insomnia-trouble sleeping-jitters    Diabetics may see a rise in blood sugar for a few days    Low back or SI joint (sacroiliac) injection: you may experience numbness in one or both legs. Please walk with help. This is temporary and will wear off in a few hours. Do not drive if you are tingling, numbness or weakness in your hands/arms or legs/feet.    Headache:  If you experience a headache after a lumbar epidural injection, lie down and drink fluids (especially caffeine). The headache will go away gradually. If it persists notify our clinic.    Fever: call if fever 100 or over, redness/warmth/swelling over the injection site.    No public hot tubs/pools for a couple days    Physical therapy: check with pain center provider regarding resuming therapy.    Monitor your response to the injection and report this at your next visit.    If you have been referred for a procedure only, please call your referring provider for a follow up.    IF YOU PLAN TO GET A FLU SHOT YOU MUST WAIT 2 WEEKS AFTER THIS INJECTION.      CALL IF ANY QUESTIONS [phone]

## 2021-06-17 NOTE — PROGRESS NOTES
Neurosurgery consultation was requested by: Dr. Shea for evaluation of Left L3-4 disc extrusion  Previous history of C5-6 fusion  Pain: presents in the low back since last August - was a gradual onset with no triggers  Radicular Pain is present: in the left anterior thigh  Lhermitte sign: denies  Motor complaints: weakness in the left leg  Sensory complaints: numbness in the right anterior thigh  Gait and balance issues: limps favoring on her left side  Bowel or bladder issues: denies incontinence or saddle anesthesia  Duration of SX is: chronic  The symptoms are worse with: walking  The symptoms are better with: laying  Injury: denies  Severity is: moderate  Patient has tried the following conservative measures: left L3-4 and L4-5 KARENA, Gabapentin, Flexeril  BRENDA score is: 24%  Ange Ford RN, CNRN

## 2021-06-17 NOTE — PROGRESS NOTES
"Pt is here for wound check s/p LEFT LUMBAR 4 NERVE ROOT DECOMPRESSION on 4-23-18 by Dr. Carranza.   Before surgery, she reports she had left lateral thigh pain with \"prickling\" and numbness to the knee..  Today she reports the pain and prickling is gone but the numbness persists and now extends below the knee to mid lateral calf.  She was taking oxycodone mostly at HS for discomfort and became extremely constipated.  Took miralax, senna and fleets and is finally getting bowels back to normal.   \"Never will take oxycodone again!\".   Requested Tylenol #3 for HS only as she has tolerated that in the past without constipation.  She has 30 oxycodone left and will return them to pharmacy.     Surgical wound WNL, dermabond in place - CDI, no signs of infection or skin breakdown.  Incision well-healed: good skin approximation, no redness or visible/palpable edema, no tenderness to palpation.  PT. AF, denies fever, chills or sweats.  Pt. reports that the symptoms are improved from pre-op.    Jimena Bee  "

## 2021-06-17 NOTE — PROGRESS NOTES
NEUROSURGERY CONSULTATION NOTE:    Assessment:    1. Lumbar disc herniation with radiculopathy         Plan: She has intractable left L4 radicular syndrome.  She has a weak left quadriceps and her pain has been persistent for more than 6 months.  He has had good conservative management.  I recommended a left L3-4 microdiscectomy.  I went over the risks and benefits to include the risk of infection, bleeding and persistent nerve dysfunction.  Also reviewed the risk of recurrent herniation.  She would like to proceed so I will submit the orders.  Total time spent in this evaluation of information, coordination of care, documentation, personal interpretation of imaging and face-to-face discussion was 45 minutes.    Amanda Sandoval   [address]  [place] [address]  68 y.o. female is sent to me in consultation   by Aaron Shea MD     CC:    Chief Complaint   Patient presents with     Consult     Left L3-4 disc extrusion       HPI:  Neurosurgery consultation was requested by: Dr. Shea for evaluation of Left L3-4 disc extrusion  Previous history of C5-6 fusion  Pain: presents in the low back since last August - was a gradual onset with no triggers  Radicular Pain is present: in the left anterior thigh  Lhermitte sign: denies  Motor complaints: weakness in the left leg  Sensory complaints: numbness in the right anterior thigh  Gait and balance issues: limps favoring on her left side  Bowel or bladder issues: denies incontinence or saddle anesthesia  Duration of SX is: chronic  The symptoms are worse with: walking  The symptoms are better with: laying  Injury: denies  Severity is: moderate  Patient has tried the following conservative measures: left L3-4 and L4-5 KARENA, Gabapentin, Flexeril  BRENDA score is: 24%    PROBLEM LIST:  1. Lumbar disc herniation with radiculopathy          REVIEW OF SYSTEMS:  A 12 point review of systems has been completed.  This is negative other than those symptoms listed above.      Past  "Medical History:   Diagnosis Date     Back pain      Hypertension      Numbness and tingling      Sciatica      Sciatica          Past Surgical History:   Procedure Laterality Date     CERVICAL FUSION       HYSTERECTOMY           MEDICATIONS:  Current Outpatient Prescriptions   Medication Sig Dispense Refill     cyclobenzaprine (FLEXERIL) 10 MG tablet        gabapentin (NEURONTIN) 300 MG capsule TK 1 C PO TID       metoprolol succinate (TOPROL-XL) 25 MG Take 25 mg by mouth.       naproxen (NAPROSYN) 500 MG tablet TK 1 T PO BID PRF PAIN       OXcarbazepine (TRILEPTAL) 150 MG tablet        promethazine-codeine (PHENERGAN WITH CODEINE) 6.25-10 mg/5 mL syrup Take 5 mL by mouth.       No current facility-administered medications for this visit.          ALLERGIES/SENSITIVITIES:     Allergies   Allergen Reactions     Penicillins        PERTINENT SOCIAL HISTORY:   Social History     Social History     Marital status:      Spouse name: N/A     Number of children: N/A     Years of education: N/A     Social History Main Topics     Smoking status: Never Smoker     Smokeless tobacco: Never Used     Alcohol use Not on file     Drug use: Not on file     Sexual activity: Not on file       FAMILY HISTORY:  Family History   Problem Relation Age of Onset     Cancer Father      Cancer Sister         PHYSICAL EXAM:   Constitutional: /80  Pulse 70  Resp 18  Ht 5' 5\" (1.651 m)  Wt 160 lb (72.6 kg)  BMI 26.63 kg/m2    General appearance: Appropriately groomed.  No acute distress.  Interactive.     Mental Status: Mental status: Alert and oriented, mood and affect appropriate, language reception and expression normal, recent and remote memory is normal, higher cortical function normal. Attention span, concentration and ability to follow commands is normal.       Cranial Nerves: Face is symmetric.  Extraocular movements are full, conjugate and without nystagmus.  Hearing is preserved.  Shoulder position is symmetric.  " Tongue is midline with normal motion.      Motor: She has mild weakness of the left quadriceps at approximately 5-/5. Tone nl, bulk nl and strength 5/5 all other LE groups.      Sensory: Sensory exam by subjective report intact to LT,PP,Position and Vib. in the UE and  LE, with the exception of the left L4 on the anterior thigh..     Station and Gait:  Station and Gait- nl stride length,  balance and lety..     Reflexes; absent knee reflexes bilateral    IMAGING:  I have personally reviewed the images and discussed the findings with Amanda Sandoval.  She has a herniated disc at L3-4 that is compressing the left L4 nerve root in the lateral recess.  She has some moderate L5-S1 foraminal narrowing.    CC:     Daxa Tucker MD[address] [address]

## 2021-06-17 NOTE — ANESTHESIA CARE TRANSFER NOTE
Pt breathing spontaneously, follows commands, suctioned extubated. Spontaneous respirations with SFM to PACU. VSS.    Last vitals:   Vitals:    04/23/18 1522   BP: 138/80   Pulse: 74   Resp: 16   Temp: 36.8  C (98.2  F)   SpO2: 100%     Patient's level of consciousness is drowsy  Spontaneous respirations: yes  Maintains airway independently: yes  Dentition unchanged: yes  Oropharynx: oropharynx clear of all foreign objects    QCDR Measures:  ASA# 20 - Surgical Safety Checklist: WHO surgical safety checklist completed prior to induction  PQRS# 430 - Adult PONV Prevention: 4558F - Pt received => 2 anti-emetic agents (different classes) preop & intraop  ASA# 8 - Peds PONV Prevention: NA - Not pediatric patient, not GA or 2 or more risk factors NOT present  PQRS# 424 - Becca-op Temp Management: 4559F - At least one body temp DOCUMENTED => 35.5C or 95.9F within required timeframe  PQRS# 426 - PACU Transfer Protocol: - Transfer of care checklist used  ASA# 14 - Acute Post-op Pain: ASA14B - Patient did NOT experience pain >= 7 out of 10

## 2021-06-17 NOTE — ANESTHESIA POSTPROCEDURE EVALUATION
Patient: Amanda Sandoval  LEFT LUMBAR 4 NERVE ROOT DECOMPRESSION  Anesthesia type: general    Patient location: PACU  Last vitals:   Vitals:    04/23/18 1600   BP: 124/69   Pulse: 74   Resp: 22   Temp: 36.7  C (98  F)   SpO2: 98%     Post vital signs: stable  Level of consciousness: awake, alert and oriented  Post-anesthesia pain: pain controlled  Post-anesthesia nausea and vomiting: no  Pulmonary: unassisted, nasal cannula  Cardiovascular: stable and blood pressure at baseline  Hydration: adequate  Anesthetic events: no    QCDR Measures:  ASA# 11 - Becca-op Cardiac Arrest: ASA11B - Patient did NOT experience unanticipated cardiac arrest  ASA# 12 - Becca-op Mortality Rate: ASA12B - Patient did NOT die  ASA# 13 - PACU Re-Intubation Rate: ASA13B - Patient did NOT require a new airway mgmt  ASA# 10 - Composite Anes Safety: ASA10A - No serious adverse event    Additional Notes:

## 2021-06-17 NOTE — ANESTHESIA PREPROCEDURE EVALUATION
Anesthesia Evaluation      Patient summary reviewed   No history of anesthetic complications     Airway   Mallampati: I  Comment: Moderate extension   Pulmonary - negative ROS and normal exam    breath sounds clear to auscultation                         Cardiovascular - normal exam  Exercise tolerance: > or = 4 METS  (+) hypertension well controlled, ,     (-) murmur  Rhythm: regular  Rate: normal,    no murmur      Neuro/Psych    (+) neuromuscular disease,      Comments: Lumbar disc herniation  S/p cervical fusion    Endo/Other - negative ROS      GI/Hepatic/Renal - negative ROS           Dental - normal exam                        Anesthesia Plan  Planned anesthetic: general endotracheal  Decadron 10 mg IV  Zofran  ASA 2   Induction: intravenous   Anesthetic plan and risks discussed with: patient  Anesthesia plan special considerations: antiemetics,   Post-op plan: routine recovery

## 2021-06-20 NOTE — LETTER
Letter by Bettye Hampton RN at      Author: Bettye Hampton RN Service: -- Author Type: --    Filed:  Encounter Date: 7/23/2020 Status: (Other)       7/23/2020        Amanda Sandoval  [address]  [place] [address]    This letter provides a written record that you were tested for COVID-19 on 7/18/2020.     Your result was negative. This means that we didnt find the virus that causes COVID-19 in your sample. A test may show negative when you do actually have the virus. This can happen when the virus is in the early stages of infection, before you feel illness symptoms.    If you have symptoms   Stay home and away from others (self-isolate) until you meet ALL of the guidelines below:    Youve had no fever--and no medicine that reduces fever--for 3 full days (72 hours). And ?    Your other symptoms have gotten better. For example, your cough or breathing has improved. And?    At least 10 days have passed since your symptoms started.    During this time:    Stay home. Dont go to work, school or anywhere else.     Stay in your own room, including for meals. Use your own bathroom if you can.    Stay away from others in your home. No hugging, kissing or shaking hands. No visitors.    Clean high touch surfaces often (doorknobs, counters, handles, etc.). Use a household cleaning spray or wipes. You can find a full list on the EPA website at www.epa.gov/pesticide-registration/list-n-disinfectants-use-against-sars-cov-2.    Cover your mouth and nose with a mask, tissue or washcloth to avoid spreading germs.    Wash your hands and face often with soap and water.    Going back to work  Check with your employer for any guidelines to follow for going back to work.    Employers: This document serves as formal notice that your employee tested negative for COVID-19, as of the testing date shown above.

## 2021-06-21 NOTE — PROGRESS NOTES
Assessment:   Left L4 radiculopathy related to L4-5 far lateral disc herniation    Plan: I have spoken with her on the phone, to follow-up on the urgent MRI scan.  Noted is the new disc herniation in the foramen at L4-5 with lateral extension.  I think she is a candidate for surgery.  The prior disc herniation has been removed and there is no recurrence at L3-4 with migration to L4-5.  She is in agreement with the plan for neurosurgical referral.  I spent 25 minutes in review of pertinent information, waiting for the MRI and personal interpretation, documentation, coordination of care and face-to-face discussion.    CC:  pt is here follow up for worsening leg pain.     HPI:  Pt has pain in her lower back goes down to her left leg stops at the ankle.   She has numbness and tingling in her left leg to her ankle, along what I interpret to be left L4.  Pt has weakness in her left leg, quadricep.    She underwent surgery in April of this year and was doing well, until recently.  She developed recurrent pain.  This is an L4 distribution with some associated weakness of the quadriceps.    Past Medical History:   Diagnosis Date     Back pain      Hypertension      Numbness and tingling      Sciatica      Sciatica      Past Surgical History:   Procedure Laterality Date     CERVICAL FUSION       DILATION AND CURETTAGE OF UTERUS  '84, '91     HYSTERECTOMY       LUMBAR DISCECTOMY Left 4/23/2018    Procedure: LEFT LUMBAR 4 NERVE ROOT DECOMPRESSION;  Surgeon: Nemo Carranza MD;  Location: [facility];  Service:      RIGHT OOPHORECTOMY  '97     TUBAL LIGATION       Current Outpatient Prescriptions on File Prior to Visit   Medication Sig Dispense Refill     acetaminophen-codeine (TYLENOL #3) 300-30 mg per tablet Take 1 tablet by mouth at bedtime as needed for pain. 15 tablet 0     cyclobenzaprine (FLEXERIL) 10 MG tablet Take 1 tablet (10 mg total) by mouth 3 (three) times a day as needed for muscle spasms. 15 tablet 0      "metoprolol succinate (TOPROL-XL) 25 MG Take 25 mg by mouth.       OXcarbazepine (TRILEPTAL) 150 MG tablet Take 150 mg by mouth as needed.        No current facility-administered medications on file prior to visit.      A 12 point review of systems is negative other than those symptoms listed above.    Family History   Problem Relation Age of Onset     Cancer Father      Cancer Sister      Exam:  /76  Pulse 63  Ht 5' 5\" (1.651 m)  Wt 164 lb (74.4 kg)  SpO2 100%  BMI 27.29 kg/m2    Mental status: Alert and oriented, mood and affect appropriate, language reception and expression normal, recent and remote memory is normal, higher cortical function normal. Attention span, concentration and ability to follow commands is normal.    Cranial nerves II through XII are grossly intact.    Motor exam shows some slight weakness of the left quad.  This is 5-/5.    Sensory changes are present in a left L4 distribution along the anterior thigh to the medial knee.    Reflexes depressed at the left patellar.    I did order an MRI, which I have reviewed now.  I did discuss this with the patient, over the phone.  She now has a far lateral disc extrusion at L4-5.  This was not present, previously.  The L3-4 disc with caudal migration to L4-5 has resolved with no recurrence.  This is where I did the surgery.    "

## 2021-06-21 NOTE — ANESTHESIA PREPROCEDURE EVALUATION
Anesthesia Evaluation      Patient summary reviewed   No history of anesthetic complications     Airway   Mallampati: II  Neck ROM: full   Pulmonary - normal exam    breath sounds clear to auscultation  (-) shortness of breath, not a smoker                         Cardiovascular   Exercise tolerance: > or = 4 METS  (+) hypertension, ,     (-) angina, murmur  ECG reviewed  Rhythm: regular  Rate: normal,    no murmur      Neuro/Psych    (+) neuromuscular disease,      Comments: Cervical fusion    Endo/Other    (-) not pregnant     GI/Hepatic/Renal    (-) GERD          Dental - normal exam                        Anesthesia Plan  Planned anesthetic: general endotracheal    ASA 2   Induction: intravenous   Anesthetic plan and risks discussed with: patient  Anesthesia plan special considerations: antiemetics,   Post-op plan: routine recovery

## 2021-06-21 NOTE — PROGRESS NOTES
Neurosurgery consultation was requested by:    Pain: back  Radicular Pain is present: lower back goes down to her left leg stop at the ankle   Lhermitte sign: no  Motor complaints: left leg   Sensory complaints: left leg   Gait and balance issues: no  Bowel or bladder issues: n  Duration of SX is: a year  Ago   The symptoms are worse with: everything   The symptoms are better with: nothing   Injury: no   Severity is: mild to moderate   Patient has tried the following conservative measures: 4 month of PT a little helpful.   BRENDA score is: 24%  PAU Perera MA

## 2021-06-21 NOTE — PROGRESS NOTES
Pt is here follow up for worsening leg pain. Pt has pain in her lower back goes down to her left leg stops a the ankle. Pt has numbness and tingling in her left leg down to her ankle. Pt has weakness in her left leg.   BRENDA: 14%  PAU Perera MA

## 2021-06-21 NOTE — PROGRESS NOTES
Patient is a 68-year-old female.  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  She had a previous laminectomy by Dr. Carranza at L3-4 on the left April 2018.. She now has a far lateral foraminal disc herniation at L4-5 on the left.  She has back pain going into the left butt and down the left leg.  She has numbness and tingling from the left hip to the left ankle.    I showed the MRI pictures to the patient and her .  I also showed them a model spine.  We talked about a far lateral foraminotomy and discectomy.  We included in the discussion the nature of the problem, nature of the surgery, rationale for doing it, goals, benefits, alternatives, and significant risks and complications.  I answered all their questions.  They said they were satisfied with the explanation and understood.  They requested surgery.  They gave informed consent to go ahead with surgery.  We are working on scheduling.  The patient and her  are satisfied with the plan. I would like to get some thin CT cuts to help with surgical planning.  Total time 40 minutes, more than 50% spent counseling and/or coordinating care.

## 2021-06-22 NOTE — ANESTHESIA POSTPROCEDURE EVALUATION
Patient: Amanda Sandoval  FAR LATERAL FORAMINOTOMY AND DISCECTOMY LUMBAR 4-5 LEFT  Anesthesia type: general    Patient location: PACU  Last vitals:   Vitals:    11/28/18 1206   BP: 131/68   Pulse: 78   Resp: 18   Temp: 36.1  C (97  F)   SpO2: 100%     Post vital signs: stable  Level of consciousness: awake and responds to simple questions  Post-anesthesia pain: pain controlled  Post-anesthesia nausea and vomiting: no  Pulmonary: unassisted, return to baseline  Cardiovascular: stable and blood pressure at baseline  Hydration: adequate  Anesthetic events: no    QCDR Measures:  ASA# 11 - Becca-op Cardiac Arrest: ASA11B - Patient did NOT experience unanticipated cardiac arrest  ASA# 12 - Becca-op Mortality Rate: ASA12B - Patient did NOT die  ASA# 13 - PACU Re-Intubation Rate: ASA13B - Patient did NOT require a new airway mgmt  ASA# 10 - Composite Anes Safety: ASA10A - No serious adverse event    Additional Notes:

## 2021-06-22 NOTE — ANESTHESIA CARE TRANSFER NOTE
Last vitals:   Vitals:    11/28/18 1206   BP: 131/68   Pulse: 78   Resp: 18   Temp: 36.1  C (97  F)   SpO2: 100%     Patient's level of consciousness is drowsy  Spontaneous respirations: yes  Maintains airway independently: yes  Dentition unchanged: yes  Oropharynx: oropharynx clear of all foreign objects    QCDR Measures:  ASA# 20 - Surgical Safety Checklist: WHO surgical safety checklist completed prior to induction    PQRS# 430 - Adult PONV Prevention: 4558F - Pt received => 2 anti-emetic agents (different classes) preop & intraop  ASA# 8 - Peds PONV Prevention: NA - Not pediatric patient, not GA or 2 or more risk factors NOT present  PQRS# 424 - Becca-op Temp Management: 4559F - At least one body temp DOCUMENTED => 35.5C or 95.9F within required timeframe  PQRS# 426 - PACU Transfer Protocol: - Transfer of care checklist used  ASA# 14 - Acute Post-op Pain: ASA14B - Patient did NOT experience pain >= 7 out of 10

## 2021-06-22 NOTE — PROGRESS NOTES
Amanda Sandoval is status post far lateral foraminotomy and discectomy at L4-5 on the left on 11/28/2018 by Dr. Tirso Chun.  Preoperatively presented with left radiculopathy in a L5 distribution.  Today she returns in follow up for staples removal. She is accompanied by her SO. She is very pleased with her outcome - her left leg pain is gone. She denies sensory or motor disturbance in LE. Gets seldom ache in her left hip. No back pain. Gait and balance are normal.  Leaves town on a weekend to winter in [place], will do PT there.    Surgical wound WNL - CDI, no signs of infection or skin breakdown.  Incision well-healed: good skin approximation, no redness or visible/palpable edema, no tenderness to palpation.  PT. AF, denies fever, chills or sweats.  Pt. reports that the symptoms are improved from pre-op.    Staples - intact removed without difficulty. Wound prepped with Betadine before and after removal.  Surrounding skin has no signs of breakdown.  Verbal instructions regarding incision care are given.  Pt. advised to call us if any s/s of infection noted - all discussed in details.      Ange Ford, RN, CNRN

## 2022-02-07 NOTE — BRIEF OP NOTE
[facility] Brief Operative Note    Pre-operative diagnosis: Chronic pancreatitis (H) [K86.1]   Post-operative diagnosis Chronic pancreatitis   Procedure: Procedure(s):  ENDOSCOPIC RETROGRADE CHOLANGIOPANCREATOGRAPHY with pancreatic stent exchange   Surgeon: Nate Morton MD   Assistants(s): Todd Dimas MD   Estimated blood loss: None    Specimens: None   Findings: Both indwelling plastic dorsal pancreatic duct stents were extracted and exchanged for a new 3-Fr x 8-cm single external pigtail plastic stent (intended to be a PD fallout stent).     Recommendations:  - Discharge.    Todd Dimas MD on 7/21/2020 at 11:03 AM    
n/a

## (undated) DEVICE — STENT FREEMAN PANCREA FLEX 4FRX11CM W/O FLANGE SGL PIGTAIL
Type: IMPLANTABLE DEVICE | Site: PANCREATIC DUCT | Status: NON-FUNCTIONAL
Removed: 2020-10-26

## (undated) DEVICE — ENDO CATH BALLOON EXTRACTOR OLYMPUS 4.0FRX195CM B5-2Q

## (undated) DEVICE — PACK ENDOSCOPY GI CUSTOM UMMC

## (undated) DEVICE — SOL WATER IRRIG 1000ML BOTTLE 2F7114

## (undated) DEVICE — BIOPSY VALVE BIOSHIELD 00711135

## (undated) DEVICE — SUCTION MANIFOLD DORNOCH ULTRA CART UL-CL500

## (undated) DEVICE — WIRE GUIDE TRACER METRO DIRECT .021"X260CM STR TIP G55705

## (undated) DEVICE — WIRE GUIDE ROADRUNNER X-SUPPORT .018X480CM STR TIP G22419

## (undated) DEVICE — ENDO FCP GRASPING 6.5MMX2.4MMX230CM RAT TOOTH DGR-276-5

## (undated) DEVICE — GUIDEWIRE NOVAGOLD .018X260CM STR TIP M00552000

## (undated) DEVICE — ENDO FUSION OMNI-TOME 21 FS-OMNI-21 G48675

## (undated) DEVICE — ENDO CATH BALLOON DILATION HURRICANE 04MMX4X180CM M00545900

## (undated) DEVICE — CANNULA BILIARY CONTOUR ERCP .018"X210CM 5-4-3 TIP

## (undated) DEVICE — ENDO TUBING CO2 SMARTCAP STERILE DISP 100145CO2EXT

## (undated) DEVICE — KIT ENDO FIRST STEP DISINFECTANT 200ML W/POUCH EP-4

## (undated) DEVICE — SPECIMEN CONTAINER 3OZ W/FORMALIN 59901

## (undated) DEVICE — KIT CONNECTOR FOR OLYMPUS ENDOSCOPES DEFENDO 100310

## (undated) DEVICE — INFLATION DEVICE BIG 60 ENDO-AN6012

## (undated) DEVICE — ENDO PROBE COVER ULTRASOUND BALLOON LATEX  MAJ-249

## (undated) DEVICE — PAD CHUX UNDERPAD 23X24" 7136

## (undated) DEVICE — ENDO SNARE POLYPECTOMY OVAL 15MM LOOP SD-240U-15

## (undated) DEVICE — ESU GROUND PAD ADULT W/CORD E7507

## (undated) DEVICE — ENDO FUSION OMNI-TOME G31903

## (undated) DEVICE — ENDO BITE BLOCK ADULT OMNI-BLOC

## (undated) DEVICE — WIRE GUIDE TRACER METRO DIRECT .021"X480CM STR TIP G26862

## (undated) DEVICE — ENDO ADPT CATH ROTATABLE SIDE ARM G22677

## (undated) DEVICE — BALLOON EXTRACTION 15X1950MM 3.2MM TL B-V243Q-A

## (undated) DEVICE — WIRE GUIDE 0.025"X270CM ANG VISIGLIDE G-240-2527A

## (undated) DEVICE — STENT FREEMAN PANCREA FLEX 4FRX07CM W/O FLANGE SGL PIGTAIL: Type: IMPLANTABLE DEVICE | Site: PANCREAS | Status: NON-FUNCTIONAL

## (undated) DEVICE — STENT FREEMAN PANCREA FLEX 4FRX09CM W/O FLANGE SGL PIGTAIL
Type: IMPLANTABLE DEVICE | Site: PANCREATIC DUCT | Status: NON-FUNCTIONAL
Removed: 2020-06-22

## (undated) DEVICE — TUBING SUCTION 10'X3/16" N510

## (undated) DEVICE — GUIDEWIRE NOVAGOLD .018X480CM STR TIP M00552010

## (undated) DEVICE — STENT FREEMAN PANCREA FLEX 5FRX9CM SGL PIGTAIL
Type: IMPLANTABLE DEVICE | Site: PANCREATIC DUCT | Status: NON-FUNCTIONAL
Removed: 2020-10-26

## (undated) DEVICE — CATH BALLOON DILATOR STERLING OTW 3.5X40X150 H74939032350410

## (undated) RX ORDER — LEVOFLOXACIN 5 MG/ML
INJECTION, SOLUTION INTRAVENOUS
Status: DISPENSED
Start: 2020-01-01

## (undated) RX ORDER — SODIUM CHLORIDE, SODIUM LACTATE, POTASSIUM CHLORIDE, CALCIUM CHLORIDE 600; 310; 30; 20 MG/100ML; MG/100ML; MG/100ML; MG/100ML
INJECTION, SOLUTION INTRAVENOUS
Status: DISPENSED
Start: 2020-01-01

## (undated) RX ORDER — LIDOCAINE HYDROCHLORIDE 20 MG/ML
INJECTION, SOLUTION EPIDURAL; INFILTRATION; INTRACAUDAL; PERINEURAL
Status: DISPENSED
Start: 2020-01-01

## (undated) RX ORDER — PROPOFOL 10 MG/ML
INJECTION, EMULSION INTRAVENOUS
Status: DISPENSED
Start: 2020-01-01

## (undated) RX ORDER — FENTANYL CITRATE 50 UG/ML
INJECTION, SOLUTION INTRAMUSCULAR; INTRAVENOUS
Status: DISPENSED
Start: 2020-01-01

## (undated) RX ORDER — IOPAMIDOL 510 MG/ML
INJECTION, SOLUTION INTRAVASCULAR
Status: DISPENSED
Start: 2020-01-01

## (undated) RX ORDER — ONDANSETRON 2 MG/ML
INJECTION INTRAMUSCULAR; INTRAVENOUS
Status: DISPENSED
Start: 2020-01-01

## (undated) RX ORDER — DEXAMETHASONE SODIUM PHOSPHATE 4 MG/ML
INJECTION, SOLUTION INTRA-ARTICULAR; INTRALESIONAL; INTRAMUSCULAR; INTRAVENOUS; SOFT TISSUE
Status: DISPENSED
Start: 2020-01-01

## (undated) RX ORDER — GLYCOPYRROLATE 0.2 MG/ML
INJECTION, SOLUTION INTRAMUSCULAR; INTRAVENOUS
Status: DISPENSED
Start: 2020-01-01

## (undated) RX ORDER — METOCLOPRAMIDE HYDROCHLORIDE 5 MG/ML
INJECTION INTRAMUSCULAR; INTRAVENOUS
Status: DISPENSED
Start: 2020-01-01

## (undated) RX ORDER — INDOMETHACIN 50 MG/1
SUPPOSITORY RECTAL
Status: DISPENSED
Start: 2020-01-01

## (undated) RX ORDER — SIMETHICONE 40MG/0.6ML
SUSPENSION, DROPS(FINAL DOSAGE FORM)(ML) ORAL
Status: DISPENSED
Start: 2020-01-01

## (undated) RX ORDER — ESMOLOL HYDROCHLORIDE 10 MG/ML
INJECTION INTRAVENOUS
Status: DISPENSED
Start: 2020-01-01